# Patient Record
Sex: FEMALE | Race: WHITE | NOT HISPANIC OR LATINO | Employment: FULL TIME | URBAN - METROPOLITAN AREA
[De-identification: names, ages, dates, MRNs, and addresses within clinical notes are randomized per-mention and may not be internally consistent; named-entity substitution may affect disease eponyms.]

---

## 2017-04-07 ENCOUNTER — TELEPHONE (OUTPATIENT)
Dept: INTERNAL MEDICINE | Facility: CLINIC | Age: 22
End: 2017-04-07

## 2017-04-07 ENCOUNTER — CLINICAL SUPPORT (OUTPATIENT)
Dept: INTERNAL MEDICINE | Facility: CLINIC | Age: 22
End: 2017-04-07

## 2017-04-07 DIAGNOSIS — Z23 IMMUNIZATION DUE: Primary | ICD-10-CM

## 2017-04-07 DIAGNOSIS — Z00.00 HEALTH CARE MAINTENANCE: Primary | ICD-10-CM

## 2017-04-07 PROCEDURE — 90471 IMMUNIZATION ADMIN: CPT | Performed by: INTERNAL MEDICINE

## 2017-04-07 PROCEDURE — 90746 HEPB VACCINE 3 DOSE ADULT IM: CPT | Performed by: INTERNAL MEDICINE

## 2018-03-29 ENCOUNTER — OFFICE VISIT (OUTPATIENT)
Dept: INTERNAL MEDICINE | Facility: CLINIC | Age: 23
End: 2018-03-29

## 2018-03-29 VITALS
DIASTOLIC BLOOD PRESSURE: 68 MMHG | SYSTOLIC BLOOD PRESSURE: 116 MMHG | HEART RATE: 110 BPM | TEMPERATURE: 99.1 F | WEIGHT: 132 LBS | RESPIRATION RATE: 17 BRPM | BODY MASS INDEX: 23.38 KG/M2

## 2018-03-29 DIAGNOSIS — R05.9 COUGH: Primary | ICD-10-CM

## 2018-03-29 LAB
EXPIRATION DATE: NORMAL
FLUAV AG NPH QL: NEGATIVE
FLUBV AG NPH QL: NEGATIVE
INTERNAL CONTROL: NORMAL
Lab: NORMAL

## 2018-03-29 PROCEDURE — 99213 OFFICE O/P EST LOW 20 MIN: CPT | Performed by: INTERNAL MEDICINE

## 2018-03-29 PROCEDURE — 87804 INFLUENZA ASSAY W/OPTIC: CPT | Performed by: INTERNAL MEDICINE

## 2018-03-29 RX ORDER — PRENATAL VIT NO.126/IRON/FOLIC 28MG-0.8MG
TABLET ORAL DAILY
COMMUNITY
End: 2018-08-23

## 2018-03-29 NOTE — PROGRESS NOTES
Subjective   Elicia Figueroa is a 23 y.o. female.     History of Present Illness   Daughter tested positive for the fu.  She has had mild sore throat and headache and cough for the last few days.  She has felt hot.  No nausea or vomiting or diarrhea.  She is 21 weeks pregnant.    The following portions of the patient's history were reviewed and updated as appropriate: allergies, current medications, past medical history and problem list.    Review of Systems   Constitutional: Positive for fatigue. Negative for fever.   HENT: Positive for congestion and sore throat.    Eyes: Negative.    Respiratory: Positive for cough. Negative for chest tightness, shortness of breath and wheezing.    Cardiovascular: Negative.  Negative for chest pain, palpitations and leg swelling.   Gastrointestinal: Negative.  Negative for abdominal distention and abdominal pain.       Objective   Physical Exam   Constitutional: She appears well-developed and well-nourished.   HENT:   Mouth/Throat: Oropharynx is clear and moist.   Neck: Normal range of motion. Neck supple.   Cardiovascular: Normal rate, regular rhythm and normal heart sounds.    No murmur heard.  Pulmonary/Chest: Effort normal and breath sounds normal. No respiratory distress. She has no wheezes. She has no rales. She exhibits no tenderness.   Lymphadenopathy:     She has no cervical adenopathy.   Nursing note and vitals reviewed.        Assessment/Plan   Elicia was seen today for cough.    Diagnoses and all orders for this visit:    Cough  -     POCT Influenza A/B    Flu tests are negative.  Probably has viral illness.  Otc meds ok.  Call if higher fever or other sx.

## 2018-08-23 ENCOUNTER — OFFICE VISIT (OUTPATIENT)
Dept: INTERNAL MEDICINE | Facility: CLINIC | Age: 23
End: 2018-08-23

## 2018-08-23 VITALS
TEMPERATURE: 98.3 F | SYSTOLIC BLOOD PRESSURE: 122 MMHG | WEIGHT: 130.38 LBS | HEART RATE: 100 BPM | BODY MASS INDEX: 23.09 KG/M2 | RESPIRATION RATE: 18 BRPM | DIASTOLIC BLOOD PRESSURE: 78 MMHG

## 2018-08-23 DIAGNOSIS — F41.8 DEPRESSION WITH ANXIETY: Primary | ICD-10-CM

## 2018-08-23 PROCEDURE — 99213 OFFICE O/P EST LOW 20 MIN: CPT | Performed by: PHYSICIAN ASSISTANT
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
## 2018-08-30 ENCOUNTER — TELEPHONE (OUTPATIENT)
Dept: INTERNAL MEDICINE | Facility: CLINIC | Age: 23
End: 2018-08-30

## 2018-08-30 NOTE — TELEPHONE ENCOUNTER
----- Message from Khloe Reyes sent at 8/29/2018  1:59 PM EDT -----  Concerning psychology referral, pt was called to schedule, however she did not want to schedule an appt right now. She has phone number to call back if she changes her mind. Is this referral okay to cancel?

## 2018-09-13 ENCOUNTER — OFFICE VISIT (OUTPATIENT)
Dept: INTERNAL MEDICINE | Facility: CLINIC | Age: 23
End: 2018-09-13

## 2018-09-13 VITALS
WEIGHT: 127.25 LBS | TEMPERATURE: 97.4 F | SYSTOLIC BLOOD PRESSURE: 108 MMHG | HEIGHT: 63 IN | RESPIRATION RATE: 16 BRPM | BODY MASS INDEX: 22.55 KG/M2 | OXYGEN SATURATION: 97 % | HEART RATE: 67 BPM | DIASTOLIC BLOOD PRESSURE: 72 MMHG

## 2018-09-13 DIAGNOSIS — F41.8 DEPRESSION WITH ANXIETY: Primary | ICD-10-CM

## 2018-09-13 PROCEDURE — 99213 OFFICE O/P EST LOW 20 MIN: CPT | Performed by: PHYSICIAN ASSISTANT

## 2018-09-13 RX ORDER — SERTRALINE HYDROCHLORIDE 100 MG/1
100 TABLET, FILM COATED ORAL DAILY
Qty: 30 TABLET | Refills: 2 | Status: SHIPPED | OUTPATIENT
Start: 2018-09-13 | End: 2018-12-21 | Stop reason: SDUPTHER

## 2018-09-13 NOTE — PROGRESS NOTES
Chief Complaint   Patient presents with   • Depression     3 week F/U       Subjective       History of Present Illness     Elicia Figueroa is a 23 y.o. female. She presents for follow up of depression with anxiety. Patient states she is doing much better since beginning Zoloft 3 weeks ago. She says her moods are improved and she is having much less frequent, less severe anxiety attacks. A few mild anxiety attacks, but no SOA and tachycardia as she was experiencing before, just occasionally feeling overwhelmed. She is having less low moods. She feels that her interactions with her family have been less irritable. She states that the best part of medication has been improved sleep. She has been trying to reach out more to family members and feels this is also helping her moods. Overall much improved. No side effects from zoloft. Interested in increase dosage as this has helped her so much but still feels that she could use more help in improved low moods.       The following portions of the patient's history were reviewed and updated as appropriate: allergies, current medications, past medical history, past social history and problem list.    Allergies   Allergen Reactions   • Adhesive Tape Hives     Red and hives       Social History   Substance Use Topics   • Smoking status: Current Every Day Smoker   • Smokeless tobacco: Not on file   • Alcohol use Not on file         Current Outpatient Prescriptions:   •  azelastine (ASTELIN) 0.1 % nasal spray, 2 sprays into each nostril 2 (Two) Times a Day. Use in each nostril as directed, Disp: 1 each, Rfl: 0  •  sertraline (ZOLOFT) 100 MG tablet, Take 1 tablet by mouth Daily., Disp: 30 tablet, Rfl: 2    Review of Systems   Constitutional: Negative for chills, fatigue and fever.   HENT: Negative for congestion, ear pain, sore throat and trouble swallowing.    Eyes: Negative for pain.   Respiratory: Negative for cough, shortness of breath and wheezing.    Cardiovascular:  Negative for chest pain and palpitations.   Gastrointestinal: Negative for abdominal pain, diarrhea, nausea and vomiting.   Genitourinary: Negative for dysuria and hematuria.   Musculoskeletal: Negative for back pain.   Skin: Negative for rash.   Neurological: Negative for dizziness, syncope, weakness and headache.   Hematological: Does not bruise/bleed easily.   Psychiatric/Behavioral: Positive for depressed mood (improving). The patient is nervous/anxious (improving).        Objective   Vitals:    09/13/18 0802   BP: 108/72   Pulse: 67   Resp: 16   Temp: 97.4 °F (36.3 °C)   SpO2: 97%     Physical Exam   Constitutional: She appears well-developed and well-nourished.   HENT:   Head: Normocephalic and atraumatic.   Mouth/Throat: Oropharynx is clear and moist and mucous membranes are normal.   Eyes: Conjunctivae are normal.   Cardiovascular: Normal rate, regular rhythm and intact distal pulses.    No murmur heard.  Pulmonary/Chest: Effort normal and breath sounds normal. She has no wheezes. She has no rales.   Abdominal: Soft. There is no hepatosplenomegaly. There is no tenderness.   Lymphadenopathy:     She has no cervical adenopathy.   Skin: No rash noted.   Psychiatric: She has a normal mood and affect. Her behavior is normal.           Assessment/Plan   Elicia was seen today for depression.    Diagnoses and all orders for this visit:    Depression with anxiety  -     sertraline (ZOLOFT) 100 MG tablet; Take 1 tablet by mouth Daily.      OK to take 50mg 2 tabs until finishes old Rx. Refill for new 100mg qday.           Return in about 3 months (around 12/13/2018).

## 2018-11-05 ENCOUNTER — OFFICE VISIT (OUTPATIENT)
Dept: PSYCHIATRY | Facility: CLINIC | Age: 23
End: 2018-11-05

## 2018-11-05 DIAGNOSIS — F41.9 ANXIETY AND DEPRESSION: Primary | ICD-10-CM

## 2018-11-05 DIAGNOSIS — Z63.0 MARITAL PROBLEMS: ICD-10-CM

## 2018-11-05 DIAGNOSIS — F32.A ANXIETY AND DEPRESSION: Primary | ICD-10-CM

## 2018-11-05 PROCEDURE — 90791 PSYCH DIAGNOSTIC EVALUATION: CPT | Performed by: PSYCHOLOGIST

## 2018-11-05 SDOH — SOCIAL STABILITY - SOCIAL INSECURITY: PROBLEMS IN RELATIONSHIP WITH SPOUSE OR PARTNER: Z63.0
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
## 2018-12-20 ENCOUNTER — OFFICE VISIT (OUTPATIENT)
Dept: INTERNAL MEDICINE | Facility: CLINIC | Age: 23
End: 2018-12-20

## 2018-12-20 VITALS
WEIGHT: 125 LBS | HEIGHT: 63 IN | TEMPERATURE: 97.3 F | HEART RATE: 80 BPM | RESPIRATION RATE: 18 BRPM | OXYGEN SATURATION: 95 % | BODY MASS INDEX: 22.15 KG/M2 | DIASTOLIC BLOOD PRESSURE: 60 MMHG | SYSTOLIC BLOOD PRESSURE: 112 MMHG

## 2018-12-20 DIAGNOSIS — R82.90 FOUL SMELLING URINE: ICD-10-CM

## 2018-12-20 DIAGNOSIS — F41.8 DEPRESSION WITH ANXIETY: Primary | ICD-10-CM

## 2018-12-20 DIAGNOSIS — R82.998 LEUKOCYTES IN URINE: ICD-10-CM

## 2018-12-20 LAB
BILIRUB BLD-MCNC: NEGATIVE MG/DL
CLARITY, POC: CLEAR
COLOR UR: YELLOW
EXPIRATION DATE: ABNORMAL
GLUCOSE UR STRIP-MCNC: NEGATIVE MG/DL
KETONES UR QL: NEGATIVE
LEUKOCYTE EST, POC: ABNORMAL
Lab: ABNORMAL
NITRITE UR-MCNC: NEGATIVE MG/ML
PH UR: 8 [PH] (ref 5–8)
PROT UR STRIP-MCNC: NEGATIVE MG/DL
RBC # UR STRIP: NEGATIVE /UL
SP GR UR: 1 (ref 1–1.03)
UROBILINOGEN UR QL: NORMAL

## 2018-12-20 PROCEDURE — 87086 URINE CULTURE/COLONY COUNT: CPT | Performed by: PHYSICIAN ASSISTANT

## 2018-12-20 PROCEDURE — 99213 OFFICE O/P EST LOW 20 MIN: CPT | Performed by: PHYSICIAN ASSISTANT

## 2018-12-20 NOTE — PROGRESS NOTES
Chief Complaint   Patient presents with   • Depression     3 month F/U       Subjective       History of Present Illness     Elicia Figueroa is a 23 y.o. female. She presents for follow up of depression and anxiety. She also reports new problem of strong urine odor x2 days. Regarding depression, patient is taking zoloft daily as directed. Continues to have some low moods and irritability, but overall improved since beginning increased dose of zoloft. She states that her anxiety has improved dramatically, and no anxiety attacks since last visit. She still has mild anxiety a few days a week, but very brief and she is able to occupy herself with other tasks which help reduce anxiety. Pt states her low moods come from family stress primarily. She does not have good support of depression and meds from . She does have good support system with other family members. She denies any thoughts of self-harm, no attempts at self-harm. Pt has seen Dr. Balbuena for therapy/ counseling, and plans to continue with this after the holidays- pleased with counseling at this time.     Pt also reports strong urine odor x2 days. She denies dark urine, blood in urine, urinary frequency or urgency, low back pain, abdominal pain or burning with urination. Pt states she has had similar strong urine odor in past without additional symptoms, with dx UTI.       The following portions of the patient's history were reviewed and updated as appropriate: allergies, current medications, past family history, past medical history, past social history, past surgical history and problem list.    Allergies   Allergen Reactions   • Adhesive Tape Hives     Red and hives       Social History     Tobacco Use   • Smoking status: Current Every Day Smoker   • Smokeless tobacco: Never Used   Substance Use Topics   • Alcohol use: Not on file         Current Outpatient Medications:   •  azelastine (ASTELIN) 0.1 % nasal spray, 2 sprays into each nostril 2 (Two) Times  a Day. Use in each nostril as directed, Disp: 1 each, Rfl: 0  •  sertraline (ZOLOFT) 100 MG tablet, Take 1 tablet by mouth Daily., Disp: 30 tablet, Rfl: 2    Review of Systems   Constitutional: Negative for chills, fatigue and fever.   HENT: Negative for congestion, ear pain and sore throat.    Respiratory: Negative for cough, shortness of breath and wheezing.    Cardiovascular: Negative for chest pain and palpitations.   Gastrointestinal: Negative for abdominal pain, diarrhea, nausea and vomiting.   Genitourinary: Negative for difficulty urinating, dysuria, frequency, hematuria and urgency.        +foul urine smell   Neurological: Negative for headache.   Psychiatric/Behavioral: Positive for depressed mood and stress. Negative for self-injury and suicidal ideas. The patient is not nervous/anxious.        Objective   Vitals:    12/20/18 0914   BP: 112/60   Pulse: 80   Resp: 18   Temp: 97.3 °F (36.3 °C)   SpO2: 95%     Physical Exam   Constitutional: She appears well-developed and well-nourished.   HENT:   Head: Normocephalic and atraumatic.   Mouth/Throat: Oropharynx is clear and moist and mucous membranes are normal.   Eyes: Conjunctivae are normal.   Cardiovascular: Normal rate and regular rhythm.   No murmur heard.  Pulmonary/Chest: Effort normal and breath sounds normal. She has no wheezes. She has no rales.   Abdominal: Soft. There is no hepatosplenomegaly. There is no tenderness. There is no CVA tenderness.   Lymphadenopathy:     She has no cervical adenopathy.   Psychiatric: Her speech is normal and behavior is normal. Thought content normal. She exhibits a depressed mood.     Results for orders placed or performed in visit on 12/20/18   Urine Culture - Urine, Urine, Clean Catch   Result Value Ref Range    Urine Culture No growth    POC Urinalysis Dipstick, Automated   Result Value Ref Range    Color Yellow Yellow, Straw, Dark Yellow, Maida    Clarity, UA Clear (A) Clear    Specific Gravity  1.005 (A) 1.005 -  1.030    pH, Urine 8.0 5.0 - 8.0    Leukocytes 75 Iza/ul (A) Negative    Nitrite, UA Negative Negative    Protein, POC Negative Negative mg/dL    Glucose, UA Negative Negative, 1000 mg/dL (3+) mg/dL    Ketones, UA Negative Negative    Urobilinogen, UA Normal Normal    Bilirubin Negative Negative    Blood, UA Negative Negative    Lot Number [id number]     Expiration Date 9-30-19          Assessment/Plan   Elicia was seen today for depression.    Diagnoses and all orders for this visit:    Depression with anxiety    Foul smelling urine  -     POC Urinalysis Dipstick, Automated    Leukocytes in urine  -     Urine Culture - Urine, Urine, Clean Catch      Discussed continuing zoloft at this time as pt has had improvement in symptoms, and anxiety greatly improved on this med. May consider alternative med in the future or additional med for improved depression relief- pt agreed to wait until after holidays to begin new meds after some stress reduced with holiday season.  Small amount leuks in urine-- will send for Cx before beginning Abx. Pt in agreement with this plan.          Return in about 6 weeks (around 1/31/2019) for Follow up- depression .

## 2018-12-21 ENCOUNTER — TELEPHONE (OUTPATIENT)
Dept: INTERNAL MEDICINE | Facility: CLINIC | Age: 23
End: 2018-12-21

## 2018-12-21 DIAGNOSIS — F41.8 DEPRESSION WITH ANXIETY: ICD-10-CM

## 2018-12-21 RX ORDER — SERTRALINE HYDROCHLORIDE 100 MG/1
100 TABLET, FILM COATED ORAL DAILY
Qty: 30 TABLET | Refills: 2 | Status: SHIPPED | OUTPATIENT
Start: 2018-12-21 | End: 2019-04-08 | Stop reason: SDUPTHER

## 2018-12-21 NOTE — TELEPHONE ENCOUNTER
----- Message from Doris Weaver sent at 12/21/2018  2:49 PM EST -----  Contact: SELF  STANLEY GIO ASKED HER PHARMACY TO SEND A REQUEST FOR ZOLOFT BUT THEY HAVE NOT HEARD BACK. SHE USES THE [facility]  IN [place]. SHE IS ALREADY OUT, SHE CAN BE REACHED -[phone]

## 2018-12-22 LAB — BACTERIA SPEC AEROBE CULT: NORMAL

## 2019-01-30 ENCOUNTER — OFFICE VISIT (OUTPATIENT)
Dept: INTERNAL MEDICINE | Facility: CLINIC | Age: 24
End: 2019-01-30

## 2019-01-30 VITALS
BODY MASS INDEX: 21.61 KG/M2 | RESPIRATION RATE: 17 BRPM | WEIGHT: 122 LBS | HEART RATE: 104 BPM | SYSTOLIC BLOOD PRESSURE: 124 MMHG | DIASTOLIC BLOOD PRESSURE: 60 MMHG

## 2019-01-30 DIAGNOSIS — Z86.69 HX OF MIGRAINE HEADACHES: ICD-10-CM

## 2019-01-30 DIAGNOSIS — F41.8 DEPRESSION WITH ANXIETY: Primary | ICD-10-CM

## 2019-01-30 PROCEDURE — 99213 OFFICE O/P EST LOW 20 MIN: CPT | Performed by: INTERNAL MEDICINE

## 2019-01-30 RX ORDER — BUTALBITAL, ASPIRIN, AND CAFFEINE 50; 325; 40 MG/1; MG/1; MG/1
1 CAPSULE ORAL EVERY 4 HOURS PRN
Qty: 30 CAPSULE | Refills: 3 | Status: SHIPPED | OUTPATIENT
Start: 2019-01-30 | End: 2019-10-08 | Stop reason: SDUPTHER

## 2019-01-30 NOTE — PROGRESS NOTES
Subjective   Elicia Arndt is a 23 y.o. female.     History of Present Illness   She is here for follow up of  Depression and anxiety on zoloft.  She believes the anxiety is better, but the depression lingers some.  She also has migraines and is having one today. Only takes ibuprofen which does not help.      The following portions of the patient's history were reviewed and updated as appropriate: allergies, current medications, past medical history and problem list.    Review of Systems   Constitutional: Negative for fatigue and fever.   Respiratory: Negative.  Negative for chest tightness and shortness of breath.    Cardiovascular: Negative.  Negative for chest pain, palpitations and leg swelling.   Gastrointestinal: Negative.  Negative for abdominal distention and abdominal pain.   Neurological: Positive for headache.   Psychiatric/Behavioral: Positive for depressed mood.       Objective   Physical Exam   Constitutional: She appears well-developed and well-nourished.   Cardiovascular: Normal rate, regular rhythm and normal heart sounds.   Neurological: She is alert.   Nursing note and vitals reviewed.        Assessment/Plan   Elicia was seen today for anxiety.    Diagnoses and all orders for this visit:    Depression with anxiety    Hx of migraine headaches    Other orders  -     butalbital-aspirin-caffeine (FIORINAL) -40 MG per capsule; Take 1 capsule by mouth Every 4 (Four) Hours As Needed for Headache.    Will try fiorinal for headaches, discussed.  Continue with zoloft for now.  She is also seeing psychologist.  May need psychiatrist if continues.  Recheck 6 months.

## 2019-02-25 ENCOUNTER — OFFICE VISIT (OUTPATIENT)
Dept: INTERNAL MEDICINE | Facility: CLINIC | Age: 24
End: 2019-02-25

## 2019-02-25 VITALS
SYSTOLIC BLOOD PRESSURE: 120 MMHG | BODY MASS INDEX: 21.62 KG/M2 | WEIGHT: 122 LBS | HEART RATE: 82 BPM | TEMPERATURE: 98.3 F | RESPIRATION RATE: 16 BRPM | HEIGHT: 63 IN | DIASTOLIC BLOOD PRESSURE: 62 MMHG | OXYGEN SATURATION: 97 %

## 2019-02-25 DIAGNOSIS — R68.89 FLU-LIKE SYMPTOMS: ICD-10-CM

## 2019-02-25 DIAGNOSIS — R35.0 URINARY FREQUENCY: ICD-10-CM

## 2019-02-25 DIAGNOSIS — J11.1 INFLUENZA: Primary | ICD-10-CM

## 2019-02-25 DIAGNOSIS — J02.8 PHARYNGITIS DUE TO OTHER ORGANISM: ICD-10-CM

## 2019-02-25 DIAGNOSIS — R82.998 LEUKOCYTES IN URINE: ICD-10-CM

## 2019-02-25 LAB
BILIRUB BLD-MCNC: NEGATIVE MG/DL
CLARITY, POC: CLEAR
COLOR UR: YELLOW
EXPIRATION DATE: ABNORMAL
EXPIRATION DATE: NORMAL
EXPIRATION DATE: NORMAL
FLUAV AG NPH QL: NEGATIVE
FLUBV AG NPH QL: NEGATIVE
GLUCOSE UR STRIP-MCNC: NEGATIVE MG/DL
INTERNAL CONTROL: NORMAL
INTERNAL CONTROL: NORMAL
KETONES UR QL: NEGATIVE
LEUKOCYTE EST, POC: ABNORMAL
Lab: ABNORMAL
Lab: NORMAL
Lab: NORMAL
NITRITE UR-MCNC: NEGATIVE MG/ML
PH UR: 7.5 [PH] (ref 5–8)
PROT UR STRIP-MCNC: NEGATIVE MG/DL
RBC # UR STRIP: NEGATIVE /UL
S PYO AG THROAT QL: NEGATIVE
SP GR UR: 1.01 (ref 1–1.03)
UROBILINOGEN UR QL: NORMAL

## 2019-02-25 PROCEDURE — 87804 INFLUENZA ASSAY W/OPTIC: CPT | Performed by: PHYSICIAN ASSISTANT

## 2019-02-25 PROCEDURE — 87880 STREP A ASSAY W/OPTIC: CPT | Performed by: PHYSICIAN ASSISTANT

## 2019-02-25 PROCEDURE — 87086 URINE CULTURE/COLONY COUNT: CPT | Performed by: PHYSICIAN ASSISTANT

## 2019-02-25 PROCEDURE — 99214 OFFICE O/P EST MOD 30 MIN: CPT | Performed by: PHYSICIAN ASSISTANT

## 2019-02-25 RX ORDER — OSELTAMIVIR PHOSPHATE 75 MG/1
75 CAPSULE ORAL 2 TIMES DAILY
Qty: 10 CAPSULE | Refills: 0 | Status: SHIPPED | OUTPATIENT
Start: 2019-02-25 | End: 2019-03-02

## 2019-02-25 NOTE — PROGRESS NOTES
Chief Complaint   Patient presents with   • Flu Symptoms     x2 days   • Urinary Frequency     x2 months       Subjective       History of Present Illness     Elicia Arndt is a 23 y.o. female. She presents with 2 day history of flu-like symptoms, with exposure to flu. Pt's daughters both dx + flu A this morning. Pt states she has body aches, chills, subjective fever, fatigue. Also has cough with light yellow phlegm production, and congestion, sneezing, sore throat. Had 1 episode N/V yesterday morning, none further since that time. She has not tried any meds for this issue.     Pt also has urinary frequency x2 months, with some associated urgency and incontinence. Pt states she is going every 1-2 hours. She has to make it to a restroom quickly, or has incontinence episode. She denies dark urine, blood in urine, pelvic pain, low back pain. She denies any burning or pressure. Pt is doing Kegel exercises daily at home.     The following portions of the patient's history were reviewed and updated as appropriate: allergies, current medications, past family history, past medical history, past social history, past surgical history and problem list.    Allergies   Allergen Reactions   • Adhesive Tape Hives     Red and hives       Social History     Tobacco Use   • Smoking status: Current Every Day Smoker   • Smokeless tobacco: Never Used   Substance Use Topics   • Alcohol use: No     Frequency: Never         Current Outpatient Medications:   •  azelastine (ASTELIN) 0.1 % nasal spray, 2 sprays into each nostril 2 (Two) Times a Day. Use in each nostril as directed, Disp: 1 each, Rfl: 0  •  butalbital-aspirin-caffeine (FIORINAL) -40 MG per capsule, Take 1 capsule by mouth Every 4 (Four) Hours As Needed for Headache., Disp: 30 capsule, Rfl: 3  •  sertraline (ZOLOFT) 100 MG tablet, Take 1 tablet by mouth Daily., Disp: 30 tablet, Rfl: 2  •  oseltamivir (TAMIFLU) 75 MG capsule, Take 1 capsule by mouth 2 (Two) Times a Day  for 5 days., Disp: 10 capsule, Rfl: 0    Review of Systems   Constitutional: Positive for chills and fatigue. Negative for fever.        +body aches   HENT: Positive for congestion, sneezing and sore throat. Negative for ear pain and trouble swallowing.    Eyes: Negative for pain.   Respiratory: Positive for cough. Negative for shortness of breath and wheezing.    Cardiovascular: Negative for chest pain and palpitations.   Gastrointestinal: Positive for nausea and vomiting. Negative for abdominal pain and diarrhea.   Genitourinary: Positive for urinary incontinence, frequency and urgency. Negative for dysuria, hematuria and pelvic pain.   Musculoskeletal: Negative for back pain.   Allergic/Immunologic: Negative for immunocompromised state.   Neurological: Negative for dizziness, weakness and headache.       Objective   Vitals:    02/25/19 1225   BP: 120/62   Pulse: 82   Resp: 16   Temp: 98.3 °F (36.8 °C)   SpO2: 97%     Physical Exam   Constitutional: She appears well-developed and well-nourished.   HENT:   Head: Normocephalic and atraumatic.   Right Ear: Tympanic membrane, external ear and ear canal normal.   Left Ear: Tympanic membrane, external ear and ear canal normal.   Mouth/Throat: Mucous membranes are normal. Posterior oropharyngeal erythema present. No oropharyngeal exudate or posterior oropharyngeal edema.   Eyes: Conjunctivae are normal.   Neck: Normal range of motion. Neck supple.   Cardiovascular: Normal rate and regular rhythm.   No murmur heard.  Pulmonary/Chest: Effort normal and breath sounds normal. She has no wheezes. She has no rales.   Abdominal: Soft. There is no hepatosplenomegaly. There is no tenderness.   Lymphadenopathy:     She has no cervical adenopathy.   Psychiatric: She has a normal mood and affect. Her behavior is normal.       Results for orders placed or performed in visit on 02/25/19   POC Influenza A / B   Result Value Ref Range    Rapid Influenza A Ag Negative Negative    Rapid  Influenza B Ag Negative Negative    Internal Control Passed Passed    Lot Number [id number]     Expiration Date 4-30-21    POC Rapid Strep A   Result Value Ref Range    Rapid Strep A Screen Negative Negative, VALID, INVALID, Not Performed    Internal Control Passed Passed    Lot Number [id number]     Expiration Date 6-30-20    POC Urinalysis Dipstick, Automated   Result Value Ref Range    Color Yellow Yellow, Straw, Dark Yellow, Maida    Clarity, UA Clear Clear    Specific Gravity  1.015 1.005 - 1.030    pH, Urine 7.5 5.0 - 8.0    Leukocytes Small (1+) (A) Negative    Nitrite, UA Negative Negative    Protein, POC Negative Negative mg/dL    Glucose, UA Negative Negative, 1000 mg/dL (3+) mg/dL    Ketones, UA Negative Negative    Urobilinogen, UA Normal Normal    Bilirubin Negative Negative    Blood, UA Negative Negative    Lot Number [id number]     Expiration Date 11-30-19            Assessment/Plan   Elicia was seen today for flu symptoms and urinary frequency.    Diagnoses and all orders for this visit:    Influenza    Flu-like symptoms  -     POC Influenza A / B  -     POC Rapid Strep A    Pharyngitis due to other organism  -     POC Rapid Strep A    Urinary frequency  -     POC Urinalysis Dipstick, Automated    Leukocytes in urine  -     Urine Culture - Urine, Urine, Clean Catch    Other orders  -     oseltamivir (TAMIFLU) 75 MG capsule; Take 1 capsule by mouth 2 (Two) Times a Day for 5 days.      Negative flu A/B. Negative strep. Will tx empirically for flu A, given that both daughters tested + flu A this AM.   Finish all Tamiflu as directed.  Tylenol or Ibuprofen rotating PRN.   Plenty of fluids and rest.   Discussed Kegel exercises and how to perform properly. Also discussed pelvic floor PT in the future if symptoms of urinary incontinence persist. Will send UCx.          Return if symptoms worsen or fail to improve.

## 2019-02-27 LAB — BACTERIA SPEC AEROBE CULT: NORMAL

## 2019-04-08 DIAGNOSIS — F41.8 DEPRESSION WITH ANXIETY: ICD-10-CM

## 2019-04-08 RX ORDER — SERTRALINE HYDROCHLORIDE 100 MG/1
TABLET, FILM COATED ORAL
Qty: 30 TABLET | Refills: 4 | Status: SHIPPED | OUTPATIENT
Start: 2019-04-08 | End: 2019-05-15 | Stop reason: SDUPTHER

## 2019-04-30 ENCOUNTER — HOSPITAL ENCOUNTER (OUTPATIENT)
Dept: GENERAL RADIOLOGY | Facility: HOSPITAL | Age: 24
Discharge: HOME | End: 2019-04-30
Admitting: NURSE PRACTITIONER

## 2019-04-30 ENCOUNTER — OFFICE VISIT (OUTPATIENT)
Dept: INTERNAL MEDICINE | Facility: CLINIC | Age: 24
End: 2019-04-30

## 2019-04-30 VITALS
OXYGEN SATURATION: 99 % | TEMPERATURE: 98.3 F | DIASTOLIC BLOOD PRESSURE: 60 MMHG | RESPIRATION RATE: 17 BRPM | HEIGHT: 63 IN | WEIGHT: 127 LBS | BODY MASS INDEX: 22.5 KG/M2 | HEART RATE: 89 BPM | SYSTOLIC BLOOD PRESSURE: 120 MMHG

## 2019-04-30 DIAGNOSIS — M25.511 RIGHT SHOULDER PAIN, UNSPECIFIED CHRONICITY: Primary | ICD-10-CM

## 2019-04-30 PROCEDURE — 96372 THER/PROPH/DIAG INJ SC/IM: CPT | Performed by: NURSE PRACTITIONER

## 2019-04-30 PROCEDURE — 99213 OFFICE O/P EST LOW 20 MIN: CPT | Performed by: NURSE PRACTITIONER

## 2019-04-30 PROCEDURE — 73030 X-RAY EXAM OF SHOULDER: CPT

## 2019-04-30 RX ORDER — KETOROLAC TROMETHAMINE 30 MG/ML
60 INJECTION, SOLUTION INTRAMUSCULAR; INTRAVENOUS ONCE
Status: COMPLETED | OUTPATIENT
Start: 2019-04-30 | End: 2019-04-30

## 2019-04-30 RX ADMIN — KETOROLAC TROMETHAMINE 60 MG: 30 INJECTION, SOLUTION INTRAMUSCULAR; INTRAVENOUS at 12:56

## 2019-04-30 NOTE — PROGRESS NOTES
Subjective:    Elicia Arndt is a 24 y.o. female.     Chief Complaint   Patient presents with   • Shoulder Injury     x2 months, right , ibuprofen, biofreeze        History of Present Illness   Patient complains of right shoulder pain x 2 months. She played softball during high school and tore rotator cuff in 2010 or 2011. She is unsure-it was when she was in 10 th grade. She had limited physical therapy and no surgery. She states she learned to deal with the pain in right shoulder. Pain has worsened for past 2 months. Current pain 10/0-10 scale. She has tried ibuprofen and Biofreeze with no relief. 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     Current Outpatient Medications:   •  butalbital-aspirin-caffeine (FIORINAL) -40 MG per capsule, Take 1 capsule by mouth Every 4 (Four) Hours As Needed for Headache., Disp: 30 capsule, Rfl: 3  •  sertraline (ZOLOFT) 100 MG tablet, TAKE ONE TABLET BY MOUTH DAILY, Disp: 30 tablet, Rfl: 4  No current facility-administered medications for this visit.      The following portions of the patient's history were reviewed and updated as appropriate: allergies, current medications, past family history, past medical history, past social history, past surgical history and problem list.    Review of Systems   Constitutional: Negative for fatigue and unexpected weight change.   Eyes: Negative for visual disturbance.   Respiratory: Negative for cough, shortness of breath and wheezing.    Cardiovascular: Negative for chest pain, palpitations and leg swelling.   Gastrointestinal: Negative for abdominal pain, blood in stool, constipation, diarrhea, nausea and vomiting.   Endocrine: Negative for polydipsia and polyuria.   Musculoskeletal: Negative for arthralgias and myalgias.        Right shoulder pain     Neurological: Negative for dizziness, syncope, light-headedness and headaches.   Psychiatric/Behavioral: Negative for decreased concentration.  "      Objective:    /60 (BP Location: Right arm, Patient Position: Sitting, Cuff Size: Adult)   Pulse 89   Temp 98.3 °F (36.8 °C) (Temporal)   Resp 17   Ht 160 cm (63\")   Wt 57.6 kg (127 lb)   SpO2 99%   BMI 22.50 kg/m²     Physical Exam   Constitutional: She appears well-developed and well-nourished. She is cooperative.  Non-toxic appearance. No distress.   HENT:   Head: Normocephalic and atraumatic.   Right Ear: External ear normal.   Left Ear: External ear normal.   Nose: Nose normal.   Mouth/Throat: Uvula is midline, oropharynx is clear and moist and mucous membranes are normal.   Eyes: Conjunctivae and lids are normal.   Neck: Normal range of motion. Neck supple.   There is no tenderness to palpation over the cervical spine or paraspinal muscles.   Cardiovascular: Normal rate, regular rhythm and normal heart sounds.   No murmur heard.  Pulmonary/Chest: Effort normal and breath sounds normal.   Musculoskeletal:        Right shoulder: She exhibits decreased range of motion, tenderness and bony tenderness.   There is tenderness to palpation of the affected shoulder.  There is no erythema, edema, or warmth.  There is pain with abduction, adduction, external rotation, and internal rotation.  The patient is able to put both hands behind the head, both hands behind the back, and do the crossover maneuver with pain. Audible pop heard when moving right shoulder.   Neurological: She is alert. She has normal strength and normal reflexes.   Skin: Skin is warm, dry and intact. No rash noted.   Psychiatric: She has a normal mood and affect.   Nursing note and vitals reviewed.      Assessment/Plan:    Elicia was seen today for shoulder injury.    Diagnoses and all orders for this visit:    Right shoulder pain, unspecified chronicity  -     XR Shoulder 1 View Right  -     Ambulatory Referral to Physical Therapy Evaluate and treat  -     MRI Shoulder Right Without Contrast; Future  -     Ambulatory Referral to " Orthopedic Surgery  -     ketorolac (TORADOL) injection 60 mg    Tylenol or ibuprofen as needed. Discussed to avoid aggravating activities.     Return if symptoms worsen or fail to improve, 7/2019 appointment.

## 2019-05-06 ENCOUNTER — OFFICE VISIT (OUTPATIENT)
Dept: ORTHOPEDIC SURGERY | Facility: CLINIC | Age: 24
End: 2019-05-06

## 2019-05-06 VITALS — OXYGEN SATURATION: 99 % | HEART RATE: 127 BPM | HEIGHT: 63 IN | BODY MASS INDEX: 22.5 KG/M2 | WEIGHT: 126.98 LBS

## 2019-05-06 DIAGNOSIS — M75.91 SUPRASPINATUS TENDINITIS, RIGHT: Primary | ICD-10-CM

## 2019-05-06 PROCEDURE — 99203 OFFICE O/P NEW LOW 30 MIN: CPT | Performed by: ORTHOPAEDIC SURGERY

## 2019-05-06 NOTE — PROGRESS NOTES
[facility] Note    Subjective     Chief Complaint   Patient presents with   • Right Shoulder - Pain     Pain since high school sports. Gotten worse recently. Trouble with sleeping at night, and holding any weight.        HPI      Elicia Arndt is a 24 y.o. female.  She says she was diagnosed with a rotator cuff tear and injury in 2010 in high school playing softball.  She underwent physical therapy one day.  She is now a CNA.  Pain is 10 out of 10.  She has an MRI scheduled for May 14.  She does not want physical therapy she does not want to work restrictions.        History reviewed. No pertinent past medical history.   History reviewed. No pertinent surgical history.   History reviewed. No pertinent family history.  Social History     Socioeconomic History   • Marital status:      Spouse name: Not on file   • Number of children: Not on file   • Years of education: Not on file   • Highest education level: Not on file   Tobacco Use   • Smoking status: Current Every Day Smoker   • Smokeless tobacco: Never Used   Substance and Sexual Activity   • Alcohol use: No     Frequency: Never      Current Outpatient Medications on File Prior to Visit   Medication Sig Dispense Refill   • butalbital-aspirin-caffeine (FIORINAL) -40 MG per capsule Take 1 capsule by mouth Every 4 (Four) Hours As Needed for Headache. 30 capsule 3   • sertraline (ZOLOFT) 100 MG tablet TAKE ONE TABLET BY MOUTH DAILY 30 tablet 4     No current facility-administered medications on file prior to visit.       Allergies   Allergen Reactions   • Adhesive Tape Hives     Red and hives          The following portions of the patient's history were reviewed and updated as appropriate: allergies, current medications, past family history, past medical history, past social history, past surgical history and problem list.    Review of Systems   Constitutional: Positive for activity change.   HENT: Negative.    Eyes: Negative.   "  Respiratory: Negative.    Cardiovascular: Negative.    Gastrointestinal: Negative.    Endocrine: Negative.    Genitourinary: Negative.    Musculoskeletal: Positive for arthralgias (right shoulder).   Skin: Negative.    Allergic/Immunologic: Negative.    Neurological: Negative.    Hematological: Negative.    Psychiatric/Behavioral: Negative.         Objective      Physical Exam  Pulse (!) 127   Ht 160 cm (63\")   Wt 57.6 kg (126 lb 15.8 oz)   SpO2 99%   Breastfeeding? No   BMI 22.49 kg/m²     Body mass index is 22.49 kg/m².        GENERAL APPEARANCE: awake, alert & oriented x 3, in no acute distress and well developed, well nourished  PSYCH: normal mood and affect  LUNGS:  breathing nonlabored, no wheezing  EYES: sclera anicteric, pupils equal  CARDIOVASCULAR: palpable pulses dorsalis pedis, palpable posterior tibial bilaterally. Capillary refill less than 2 seconds  INTEGUMENTARY: skin intact, no clubbing, cyanosis  NEUROLOGIC:  Normal Sensation and reflexes             Ortho Exam  Musculoskeletal   Upper Extremity   Right Shoulder     Inspection and Palpation:     Tenderness - none    Crepitus - none    Sensation is normal    Examination reveals no ecchymosis.      Strength and Tone:    Supraspinatus,  Infraspinatus - 4/5    Subscapularis - 5/5    Deltoid - 5/5     Range of Motion   Left shoulder:    Internal Rotation: ROM - T7    External Rotation: AROM - 80 degrees    Elevation through flexion: AROM - 180 degrees    Right Shoulder:    Internal Rotation: ROM - T7    External Rotation: AROM - 80 degrees    Elevation through flexion: AROM - 150 degrees     Abduction -150 degrees     Instability   Right shoulder    Sulcus sign negative    Apprehension test negative    Tawny relocation test negative    Jerk test negative   Impingement   Right shoulder    Kirkland impingement test positive    Neer impingement test positive   Functional Testing   Right shoulder    AC crossover adduction test negative    Abdominal " compression test negative    Lift-off sign negative    Speed's test negative    Thurman's test negative    Horriblower's sign negative       Imaging/Studies  Imaging Results (last 7 days)     ** No results found for the last 168 hours. **        I viewed her x-rays from April 30  Assessment/Plan        ICD-10-CM ICD-9-CM   1. Supraspinatus tendinitis, right M75.91 726.10     I agree with the order for the MRI.  She has a known rotator cuff injury from 9 years ago.  Based upon the MRI if she has a full-thickness tear to the cuff or labrum I would recommend surgery.  If she has a small partial tear we will do additional physical therapy.  I will see her back after the MRI.  She will continue physical therapy in the meantime.  Medical Decision Making  Management Options : over-the-counter medicine and physical/occupational therapy  Data/Risk: radiology tests and independent visualization of imaging, lab tests, or EMG/NCV    Ted Ryan MD  05/06/19  10:31 AM         EMR Dragon/Transcription disclaimer:  Much of this encounter note is an electronic transcription of spoken language to printed text. Electronic transcription of spoken language may permit erroneous, or at times, nonsensical words or phrases to be inadvertently transcribed. Although I have reviewed the note for such errors, some may still exist.

## 2019-05-09 ENCOUNTER — HOSPITAL ENCOUNTER (OUTPATIENT)
Dept: PHYSICAL THERAPY | Facility: HOSPITAL | Age: 24
Setting detail: THERAPIES SERIES
Discharge: HOME | End: 2019-05-09

## 2019-05-09 DIAGNOSIS — M25.511 RIGHT SHOULDER PAIN, UNSPECIFIED CHRONICITY: Primary | ICD-10-CM

## 2019-05-09 PROCEDURE — 97161 PT EVAL LOW COMPLEX 20 MIN: CPT | Performed by: PHYSICAL THERAPIST

## 2019-05-09 NOTE — THERAPY EVALUATION
Outpatient Physical Therapy Ortho Initial Evaluation  [facility]     Patient Name: Elicia Arndt  : 1995  MRN: [id number]  Today's Date: 2019      Visit Date: 2019    Patient Active Problem List   Diagnosis   • Constipation   • Pain in female pelvis   • Depression with anxiety   • Hx of migraine headaches        No past medical history on file.     No past surgical history on file.    Visit Dx:     ICD-10-CM ICD-9-CM   1. Right shoulder pain, unspecified chronicity M25.511 719.41         Patient History     Row Name 19 0800             History    Chief Complaint  Pain  -CR      Type of Pain  Shoulder pain  -CR      Date Current Problem(s) Began  05/09/10  -CR      Brief Description of Current Complaint  25 yo female report in  tore right RC.  Client saw Dr. Ryan and will be receiving MRI 19.  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  She uses 600 mg ibuprofen, biofreeze.    -CR      Patient/Caregiver Goals  Relieve pain  -CR      Current Tobacco Use  yes  -CR      Smoking Status  yes  -CR      Hand Dominance  right-handed  -CR      Occupation/sports/leisure activities  CNA   -CR      Patient seeing anyone else for problem(s)?  Dr. Ryan  -CR      Are you or can you be pregnant  No  -CR         Pain     Pain Location  Shoulder  -CR      Pain at Present  5  -CR      Pain Frequency  Constant/continuous  -CR      What Performance Factors Make the Current Problem(s) WORSE?  use of arm, sleeping on side, carrying child  -CR      What Performance Factors Make the Current Problem(s) BETTER?  none identified  -CR      Is your sleep disturbed?  Yes  -CR      Difficulties at work?  yes, as CNA  -CR      Difficulties with ADL's?  yes, with raising arm  -CR         Fall Risk Assessment    Any falls in the past year:  No  -CR      Does patient have a fear of falling  No  -CR          Daily Activities    Primary Language  English  -CR      Are you able to read  Yes  -CR      Are you able to write  Yes  -CR      Patient is concerned about/has problems with  Performing job responsibilities/community activities (work, school,;Performing home management (household chores, shopping, care of dependents)  -CR      Does patient have problems with the following?  Depression;Anxiety  -CR      Barriers to learning  None  -CR      Pt Participated in POC and Goals  Yes  -CR         Safety    Are you being hurt, hit, or frightened by anyone at home or in your life?  No  -CR      Are you being neglected by a caregiver  No  -CR        User Key  (r) = Recorded By, (t) = Taken By, (c) = Cosigned By    Initials Name Provider Type    CR Rocky Beltrán, PT Physical Therapist          PT Ortho     Row Name 05/09/19 0800       Special Tests/Palpation    Special Tests/Palpation  Shoulder  -CR       Shoulder Girdle Accessory Motions    Shoulder Girdle Accessory Motions Tested?  Yes  -CR    Posterior glide of humerus  Hypomobile  -CR       Shoulder Impingement/Rotator Cuff Special Tests    Kirkland-Jamarcus Test (RC Lesion vs. Bursitis)  Positive  -CR    Neer Impingement Test (RC Lesion vs. Bursitis)  Positive  -CR    Full Can Test (RC Lesion)  Positive  -CR       Shoulder Laxity/Instability Special Tests    Anterior Apprehension/Relocation Test, at 90 Degrees  Negative  -CR       General ROM    RT Upper Ext  Rt Shoulder ABduction;Rt Shoulder Flexion;Rt Shoulder External Rotation;Rt Shoulder Internal Rotation  -CR    LT Upper Ext  Lt Shoulder ABduction;Lt Shoulder Extension;Lt Shoulder External Rotation;Lt Shoulder Internal Rotation  -CR       Right Upper Ext    Rt Shoulder Abduction AROM  90  -CR    Rt Shoulder Flexion AROM  140  -CR    Rt Shoulder Flexion PROM  160  -CR    Rt Shoulder External Rotation AROM  60  -CR       Left Upper Ext    Lt Shoulder Abduction AROM  180  -CR    Lt Shoulder Flexion AROM  180  -CR     Lt Shoulder External Rotation AROM  90  -CR       MMT (Manual Muscle Testing)    Rt Upper Ext  Rt Shoulder Internal Rotation;Rt Shoulder External Rotation;Rt Shoulder Flexion;Rt Shoulder ABduction  -CR       MMT Right Upper Ext    Rt Shoulder Flexion MMT, Gross Movement  (4-/5) good minus  -CR    Rt Shoulder ABduction MMT, Gross Movement  (4/5) good  -CR    Rt Shoulder Internal Rotation MMT, Gross Movement  (4-/5) good minus  -CR    Rt Shoulder External Rotation MMT, Gross Movement  (4-/5) good minus  -CR        Strength Right    # Reps  1  -CR    Right Rung  2  -CR    Right  Test 1  65  -CR     Strength Average Right  65  -CR        Strength Left    # Reps  1  -CR    Left Rung  2  -CR    Left  Test 1  65  -CR     Strength Average Left  65  -CR       Hand  Strength     Strength Affected Side  Bilateral  -CR      User Key  (r) = Recorded By, (t) = Taken By, (c) = Cosigned By    Initials Name Provider Type    Rocky Conner, PT Physical Therapist                      Therapy Education  Education Details: Client has been provided a written HEP including shoulder isometrics in abd, add, IR, and ER directions.       PT OP Goals     Row Name 05/09/19 0800          PT Short Term Goals    STG Date to Achieve  06/06/19  -CR     STG 1  client will demonstrate independence with initial HEP  -CR     STG 1 Progress  New  -CR     STG 2  client will demonstrate increase AROM elevation to 150 degrees  -CR     STG 2 Progress  New  -CR     STG 3  Client will demonstrate increase in strength to 4/5   -CR     STG 3 Progress  New  -CR        Long Term Goals    LTG Date to Achieve  07/04/19  -CR     LTG 1  client will demonstrate full AROM right shoulder   -CR     LTG 1 Progress  New  -CR     LTG 2  client will demonstrate >/= 4+/5 strength RUE  -CR     LTG 2 Progress  New  -CR     LTG 3  client will report qDASH limited <50  -CR     LTG 3 Progress  New  -CR        Time Calculation    PT Goal  Re-Cert Due Date  08/07/19  -CR       User Key  (r) = Recorded By, (t) = Taken By, (c) = Cosigned By    Initials Name Provider Type    Rocky Conner, PT Physical Therapist          PT Assessment/Plan     Row Name 05/09/19 0828 05/09/19 0827       PT Assessment    Functional Limitations  --  Limitation in home management;Performance in leisure activities;Performance in work activities;Limitations in functional capacity and performance;Performance in self-care ADL  -CR    Impairments  --  Posture;Muscle strength;Pain;Range of motion;Joint mobility;Motor function  -CR    Assessment Comments  23 yo female arrives with evolving symptoms of low complexity.  She has history of right RC tear and ongoing symptoms have prompted follow up MRI and MD visit.  Today she demosntrates impairments in strength, AROM/PROM, and GH and scapular rhythm.  Client demonstrates anxiety re: therapy intervention due to previous experiences of pain.  She has been educated on initial HEP consisting of isometric TE and will follow up 2x/week until imaging and MD follow up is completed to further update/or continue POC.   -CR  --    Please refer to paper survey for additional self-reported information  Yes  -CR  --    Rehab Potential  Fair  -CR  --    Patient/caregiver participated in establishment of treatment plan and goals  Yes  -CR  --    Patient would benefit from skilled therapy intervention  Yes  -CR  --       PT Plan    PT Frequency  1x/week;2x/week  -CR  --    Predicted Duration of Therapy Intervention (Therapy Eval)  12 visits  -CR  --    Planned CPT's?  PT EVAL LOW COMPLEXITY: 17657;PT MANUAL THERAPY EA 15 MIN: 77086;PT THER PROC EA 15 MIN: 22784;PT HOT/COLD PACK WC NONMCARE: 24937;PT RE-EVAL: 36237;PT THER ACT EA 15 MIN: 26943;PT NEUROMUSC RE-EDUCATION EA 15 MIN: 28418;PT SELF CARE/HOME MGMT/TRAIN EA 15: 48512;PT SELF CARE/MGMT/TRAIN 15 MIN: 03767  -CR  --    PT Plan Comments  2x/week to address pain, strength, ROM, and  functional deficits.    -CR  --      User Key  (r) = Recorded By, (t) = Taken By, (c) = Cosigned By    Initials Name Provider Type    Rocky Conner, PT Physical Therapist                              Outcome Measure Options: Quick DASH  Quick DASH  Open a tight or new jar.: Moderate Difficulty  Do heavy household chores (e.g., wash walls, wash floors): Moderate Difficulty  Carry a shopping bag or briefcase: Severe Difficulty  Wash your back: Unable  Use a knife to cut food: No Difficulty  Recreational activities in which you take some force or impact through your arm, should or hand (e.g. golf, hammering, tennis, etc.): Severe Difficulty  During the past week, to what extent has your arm, shoulder, or hand problem interfered with your normal social activites with family, friends, neighbors or groups?: Extremely  During the past week, were you limited in your work or other regular daily activities as a result of your arm, shoulder or hand problem?: Not limited at all  Arm, Shoulder, or hand pain: Extreme  Tingling (pins and needles) in your arm, shoulder, or hand: Extreme  During the past week, how much difficulty have you had sleeping because of the pain in your arm, shoulder or hand?: So much Difficulty that I can't sleep  Number of Questions Answered: 11  Quick DASH Score: 68.18         Time Calculation:     Start Time: 0800     Therapy Charges for Today     Code Description Service Date Service Provider Modifiers Qty    90328160776  PT EVAL LOW COMPLEXITY 3 5/9/2019 Rocky Beltrán, PT GP 1          PT G-Codes  Outcome Measure Options: Quick DASH  Quick DASH Score: 68.18         Rocky Beltrán PT  5/9/2019

## 2019-05-14 ENCOUNTER — HOSPITAL ENCOUNTER (OUTPATIENT)
Dept: MRI IMAGING | Facility: HOSPITAL | Age: 24
Discharge: HOME | End: 2019-05-14
Admitting: NURSE PRACTITIONER

## 2019-05-14 ENCOUNTER — HOSPITAL ENCOUNTER (OUTPATIENT)
Dept: PHYSICAL THERAPY | Facility: HOSPITAL | Age: 24
Setting detail: THERAPIES SERIES
Discharge: HOME | End: 2019-05-14

## 2019-05-14 DIAGNOSIS — M25.511 RIGHT SHOULDER PAIN, UNSPECIFIED CHRONICITY: ICD-10-CM

## 2019-05-14 PROCEDURE — 97110 THERAPEUTIC EXERCISES: CPT | Performed by: PHYSICAL THERAPIST

## 2019-05-14 PROCEDURE — 73221 MRI JOINT UPR EXTREM W/O DYE: CPT

## 2019-05-14 PROCEDURE — 97140 MANUAL THERAPY 1/> REGIONS: CPT | Performed by: PHYSICAL THERAPIST

## 2019-05-15 DIAGNOSIS — F41.8 DEPRESSION WITH ANXIETY: ICD-10-CM

## 2019-05-16 RX ORDER — SERTRALINE HYDROCHLORIDE 100 MG/1
100 TABLET, FILM COATED ORAL DAILY
Qty: 30 TABLET | Refills: 4 | Status: SHIPPED | OUTPATIENT
Start: 2019-05-16 | End: 2020-02-28 | Stop reason: SDUPTHER

## 2019-05-20 ENCOUNTER — OFFICE VISIT (OUTPATIENT)
Dept: ORTHOPEDIC SURGERY | Facility: CLINIC | Age: 24
End: 2019-05-20

## 2019-05-20 VITALS — BODY MASS INDEX: 22.5 KG/M2 | HEIGHT: 63 IN | HEART RATE: 83 BPM | WEIGHT: 126.98 LBS | OXYGEN SATURATION: 98 %

## 2019-05-20 DIAGNOSIS — M75.91 SUPRASPINATUS TENDINITIS, RIGHT: Primary | ICD-10-CM

## 2019-05-20 PROCEDURE — 99213 OFFICE O/P EST LOW 20 MIN: CPT | Performed by: ORTHOPAEDIC SURGERY

## 2019-05-20 NOTE — PROGRESS NOTES
[facility]    Subjective     Chief Complaint   Patient presents with   • Right Shoulder - Follow-up     MRI review 5/14/19        HPI      Elicia Arndt is a 24 y.o. female.  She is follow-up MRI of the right shoulder.  She says she is doing about the same.  Pain is 8 out of 10 at times.  She recently started physical therapy.  She takes ibuprofen 600 mg.      History reviewed. No pertinent past medical history.   History reviewed. No pertinent surgical history.   History reviewed. No pertinent family history.  Social History     Socioeconomic History   • Marital status:      Spouse name: Not on file   • Number of children: Not on file   • Years of education: Not on file   • Highest education level: Not on file   Tobacco Use   • Smoking status: Current Every Day Smoker   • Smokeless tobacco: Never Used   Substance and Sexual Activity   • Alcohol use: No     Frequency: Never      Current Outpatient Medications on File Prior to Visit   Medication Sig Dispense Refill   • butalbital-aspirin-caffeine (FIORINAL) -40 MG per capsule Take 1 capsule by mouth Every 4 (Four) Hours As Needed for Headache. 30 capsule 3   • sertraline (ZOLOFT) 100 MG tablet Take 1 tablet by mouth Daily. 30 tablet 4     No current facility-administered medications on file prior to visit.       Allergies   Allergen Reactions   • Adhesive Tape Hives     Red and hives          The following portions of the patient's history were reviewed and updated as appropriate: allergies, current medications, past family history, past medical history, past social history, past surgical history and problem list.    Review of Systems   Constitutional: Positive for activity change.   HENT: Negative.    Eyes: Negative.    Respiratory: Negative.    Cardiovascular: Negative.    Gastrointestinal: Negative.    Endocrine: Negative.    Genitourinary: Negative.    Musculoskeletal: Positive for arthralgias.   Skin: Negative.   "  Allergic/Immunologic: Negative.    Neurological: Negative.    Hematological: Negative.    Psychiatric/Behavioral: Negative.         Objective      Physical Exam  Pulse 83   Ht 160 cm (63\")   Wt 57.6 kg (126 lb 15.8 oz)   SpO2 98%   Breastfeeding? No   BMI 22.49 kg/m²     Body mass index is 22.49 kg/m².        GENERAL APPEARANCE: awake, alert & oriented x 3, in no acute distress and well developed, well nourished  PSYCH: normal mood and affect    Musculoskeletal   Upper Extremity   Right Shoulder     Inspection and Palpation:     Tenderness - none    Crepitus - none    Sensation is normal    Examination reveals no ecchymosis.      Strength and Tone:    Supraspinatus,  Infraspinatus - 5/5    Subscapularis - 5/5    Deltoid - 5/5     Range of Motion   Left shoulder:    Internal Rotation: ROM - T7    External Rotation: AROM - 80 degrees    Elevation through flexion: AROM - 180 degrees    Right Shoulder:    Internal Rotation: ROM - T7    External Rotation: AROM - 80 degrees    Elevation through flexion: AROM - 180 degrees     Abduction -180 degrees     Instability   Right shoulder    Sulcus sign negative    Apprehension test negative    Tawny relocation test negative    Jerk test negative   Impingement   Right shoulder    Kirkland impingement test positive    Neer impingement test positive   Functional Testing   Right shoulder    AC crossover adduction test negative    Abdominal compression test negative    Lift-off sign negative    Speed's test negative    Thurman's test negative    Horriblower's sign negative       Imaging/Studies  Imaging Results (last 7 days)     ** No results found for the last 168 hours. **        I viewed her MRI from May 14 which shows thickening of her rotator cuff consistent with tendinopathy  Assessment/Plan        ICD-10-CM ICD-9-CM   1. Supraspinatus tendinitis, right M75.91 726.10       Orders Placed This Encounter   Procedures   • Ambulatory Referral to Physical Therapy      She will do " physical therapy.  I offered her a cortisone injection.  She says she does not like shots.  She will continue the 600 mg ibuprofen every 8 hours.  She will follow-up in 4 weeks.  She is working full duty.    Medical Decision Making  Management Options : over-the-counter medicine, prescription/IM medicine and physical/occupational therapy  Data/Risk: radiology tests and independent visualization of imaging, lab tests, or EMG/NCV    Ted Ryan MD  05/20/19  9:54 AM         EMR Dragon/Transcription disclaimer:  Much of this encounter note is an electronic transcription of spoken language to printed text. Electronic transcription of spoken language may permit erroneous, or at times, nonsensical words or phrases to be inadvertently transcribed. Although I have reviewed the note for such errors, some may still exist.

## 2019-05-21 ENCOUNTER — APPOINTMENT (OUTPATIENT)
Dept: PHYSICAL THERAPY | Facility: HOSPITAL | Age: 24
End: 2019-05-21

## 2019-05-23 ENCOUNTER — HOSPITAL ENCOUNTER (OUTPATIENT)
Dept: PHYSICAL THERAPY | Facility: HOSPITAL | Age: 24
Setting detail: THERAPIES SERIES
Discharge: HOME | End: 2019-05-23

## 2019-05-23 DIAGNOSIS — M25.511 RIGHT SHOULDER PAIN, UNSPECIFIED CHRONICITY: Primary | ICD-10-CM

## 2019-05-23 PROCEDURE — 97140 MANUAL THERAPY 1/> REGIONS: CPT | Performed by: PHYSICAL THERAPIST

## 2019-05-23 PROCEDURE — 97110 THERAPEUTIC EXERCISES: CPT | Performed by: PHYSICAL THERAPIST

## 2019-05-23 NOTE — THERAPY TREATMENT NOTE
Outpatient Physical Therapy Ortho Treatment Note  [facility]     Patient Name: Elicia Arndt  : 1995  MRN: [id number]  Today's Date: 2019      Visit Date: 2019    Visit Dx:    ICD-10-CM ICD-9-CM   1. Right shoulder pain, unspecified chronicity M25.511 719.41       Patient Active Problem List   Diagnosis   • Constipation   • Pain in female pelvis   • Depression with anxiety   • Hx of migraine headaches        No past medical history on file.     No past surgical history on file.    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 She has been compliant with her HEP and feels the exercises are helpful and do not cause pain. She admitted that she would prefer to have surgery because PT seems difficult but stated she would be compliant nonetheless.   -LS       Precautions and Contraindications    Precautions/Limitations  no known precautions/limitations  -LS       Subjective Pain    Able to rate subjective pain?  yes  -LS    Pre-Treatment Pain Level  9  -LS    Post-Treatment Pain Level  9  -LS       Shoulder Girdle Palpation    Supraspinatus Insertion  Right:;Tender  -LS       Right Upper Ext    Rt Shoulder Abduction AROM  97  -LS    Rt Shoulder Flexion AROM  140  -LS    Rt Shoulder External Rotation AROM  70  -LS    Rt Shoulder Internal Rotation AROM  T9  -LS    Rt Upper Extremity Comments   PROM WFL  -LS      User Key  (r) = Recorded By, (t) = Taken By, (c) = Cosigned By    Initials Name Provider Type    LS Mitul Phillips PT Physical Therapist                      PT Assessment/Plan     Row Name 19 09          PT Assessment    Assessment Comments  Pt presented to PT with an inc in R shoulder pain rated 9/10 following several consecutive days of weedeating. She was encouraged to discontinue weedeating due to the  rhythmic stability required from the damaged RC, though she stated she would most likely continue the chore. MT was tolerated very well today and PROM was WFL with only a mild pain at end-range. She demonstrated some improvements in AROM in flexion in abduction and elevation limitations are likely due to RC weakness. CKC wall slides resulted in full ROM and were tolerated well. She reported some N/T in her hand with MT which I believe is due to median and ulnar nn tightness. She was shown nerve glides which were tolerated very well. Overall, the pt looked better today though she reported inc pain. I expect her motion and function to improve as her RC strength increases.   -LS        PT Plan    PT Plan Comments  Continue progression of periscapular and RC strengthening as tolerated.   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 She has been compliant with her HEP and feels the exercises are helpful and do not cause pain. She admitted that she would prefer to have surgery because PT seems difficult but stated she would be compliant nonetheless.   -LS         Subjective Pain    Able to rate subjective pain?  yes  -LS      Pre-Treatment Pain Level  9  -LS      Post-Treatment Pain Level  9  -LS         Total Minutes    92899 - PT Therapeutic Exercise Minutes  35  -LS      68619 - PT Manual Therapy Minutes  10  -LS         Exercise 1    Exercise Name 1  TE per external documentation to review and progress HEP and to introduce stretching and nerve mobs.   -        User Key  (r) = Recorded By,  (t) = Taken By, (c) = Cosigned By    Initials Name Provider Type    Mitul Jones PT Physical Therapist                      Manual Rx (last 36 hours)      Manual Treatments     Row Name 05/23/19 0900             Total Minutes    50327 - PT Manual Therapy Minutes  10  -LS         Manual Rx 1    Manual Rx 1 Location  R shoulder   -LS      Manual Rx 1 Type  Inferior jt mobs with PROM into abd and flex  -LS      Manual Rx 1 Grade  3  -LS        User Key  (r) = Recorded By, (t) = Taken By, (c) = Cosigned By    Initials Name Provider Type    Mitul Jones PT Physical Therapist                             Time Calculation:   Start Time: 0920  Therapy Charges for Today     Code Description Service Date Service Provider Modifiers Qty    85862964841  PT THER PROC EA 15 MIN 5/23/2019 Mitul Phillips, PT GP 2    [id number]  PT MANUAL THERAPY EA 15 MIN 5/23/2019 Mitul Phillips, PT GP 1                    Mitul Phillips PT  5/23/2019

## 2019-05-28 ENCOUNTER — HOSPITAL ENCOUNTER (OUTPATIENT)
Dept: PHYSICAL THERAPY | Facility: HOSPITAL | Age: 24
Setting detail: THERAPIES SERIES
Discharge: HOME | End: 2019-05-28

## 2019-05-28 DIAGNOSIS — M25.511 RIGHT SHOULDER PAIN, UNSPECIFIED CHRONICITY: Primary | ICD-10-CM

## 2019-05-28 PROCEDURE — 97110 THERAPEUTIC EXERCISES: CPT | Performed by: PHYSICAL THERAPIST

## 2019-05-28 NOTE — THERAPY TREATMENT NOTE
Outpatient Physical Therapy Ortho Treatment Note  [facility]     Patient Name: Elicia Arndt  : 1995  MRN: [id number]  Today's Date: 2019      Visit Date: 2019    Visit Dx:    ICD-10-CM ICD-9-CM   1. Right shoulder pain, unspecified chronicity M25.511 719.41       Patient Active Problem List   Diagnosis   • Constipation   • Pain in female pelvis   • Depression with anxiety   • Hx of migraine headaches        No past medical history on file.     No past surgical history on file.    PT Ortho     Row Name 19 0900       Subjective Comments    Subjective Comments  Pt stated that her shoulder was very sore following a long day at work yesterday. She withheld from weedeating since her last visit though she plans to continue yard work this week. She has been compliant with her HEP and feels they are going well. She stated that she was very tired upon presentation.   -LS       Precautions and Contraindications    Precautions/Limitations  no known precautions/limitations  -LS       Subjective Pain    Able to rate subjective pain?  yes  -LS    Pre-Treatment Pain Level  0  -LS    Post-Treatment Pain Level  1  -LS       Shoulder Girdle Palpation    Teres Minor  Right:;Tender;Guarded/taut Posterior corner  -LS       Right Upper Ext    Rt Shoulder Abduction AROM  105  -LS    Rt Shoulder Flexion AROM  140  -LS    Rt Shoulder External Rotation AROM  75  -LS    Rt Shoulder Internal Rotation AROM  T7  -LS      User Key  (r) = Recorded By, (t) = Taken By, (c) = Cosigned By    Initials Name Provider Type    Mitul Jones PT Physical Therapist                      PT Assessment/Plan     Row Name 19 0900          PT Assessment    Assessment Comments  Pt presented to PT with notable lethargy due to a poor night of sleep. Her shoulder pain was rated significanlty lower than it has been in previous visits though she noted some soreness in the posterior corner mm. PROM is full and no joint restrictions  were noted with manual assessment so MT was withheld. AROM is most prominently lacking in abduction, which I believe is due to RC weakness, as PROM is full and CKC AROM is far greater than OKC. She had no shoulder pain with exercise and her HEP was progressed to include CKC abduction at the wall. RS exercises were introduced and tolerated well, though significant weakness and poor timing of mm activation was observed. Following treatment, shoulder pain was mildly inc and pt requested ice which resulted in dec pain.   -LS        PT Plan    PT Plan Comments  Continue progression of RC strengthening in the [facility] with progressions of RS as tolerated.   -LS       User Key  (r) = Recorded By, (t) = Taken By, (c) = Cosigned By    Initials Name Provider Type    Mitul Jones, PT Physical Therapist          Modalities     Row Name 05/28/19 0900             Ice    Ice Applied  Yes  -LS      Location  R shoulder   -LS      Rx Minutes  10 mins  -LS      Ice S/P Rx  Yes  -LS        User Key  (r) = Recorded By, (t) = Taken By, (c) = Cosigned By    Initials Name Provider Type    Mitul Jones PT Physical Therapist        Exercises     Row Name 05/28/19 0900             Precautions    Existing Precautions/Restrictions  no known precautions/restrictions  -LS         Subjective Comments    Subjective Comments  Pt stated that her shoulder was very sore following a long day at work yesterday. She withheld from weedeating since her last visit though she plans to continue yard work this week. She has been compliant with her HEP and feels they are going well. She stated that she was very tired upon presentation.   -LS         Subjective Pain    Able to rate subjective pain?  yes  -LS      Pre-Treatment Pain Level  0  -LS      Post-Treatment Pain Level  1  -LS         Total Minutes    51418 - PT Therapeutic Exercise Minutes  43  -LS         Exercise 1    Exercise Name 1  TE per external documentation to strengthen the RC in the [facility];  introduction of RS  -LS        User Key  (r) = Recorded By, (t) = Taken By, (c) = Cosigned By    Initials Name Provider Type    Mitul Jones, PT Physical Therapist                                          Time Calculation:   Start Time: 0900  Therapy Charges for Today     Code Description Service Date Service Provider Modifiers Qty    [id number]  PT THER PROC EA 15 MIN 5/28/2019 Mitul Phillips, PT GP 3                    Mitul Phillips PT  5/28/2019

## 2019-05-30 ENCOUNTER — HOSPITAL ENCOUNTER (OUTPATIENT)
Dept: PHYSICAL THERAPY | Facility: HOSPITAL | Age: 24
Setting detail: THERAPIES SERIES
Discharge: HOME | End: 2019-05-30

## 2019-05-30 DIAGNOSIS — M25.511 RIGHT SHOULDER PAIN, UNSPECIFIED CHRONICITY: Primary | ICD-10-CM

## 2019-05-30 PROCEDURE — 97110 THERAPEUTIC EXERCISES: CPT | Performed by: PHYSICAL THERAPIST

## 2019-05-30 NOTE — THERAPY TREATMENT NOTE
Outpatient Physical Therapy Ortho Treatment Note  [facility]     Patient Name: Elicia Arndt  : 1995  MRN: [id number]  Today's Date: 2019      Visit Date: 2019    Visit Dx:    ICD-10-CM ICD-9-CM   1. Right shoulder pain, unspecified chronicity M25.511 719.41       Patient Active Problem List   Diagnosis   • Constipation   • Pain in female pelvis   • Depression with anxiety   • Hx of migraine headaches        No past medical history on file.     No past surgical history on file.    PT Ortho     Row Name 19 0800       Subjective Comments    Subjective Comments  Pt stated that her shoulder was very painful upon presentation due to a strenuous day of work yesterday. She had to perform several pt transfers and reported that her shoulder soreness inc throughout the day. She did not perform her HEP yesterday due to pain and fatigue. She attempted to manage pain with ice and ibuprofen but stated it was largely unsuccessful.   -LS       Precautions and Contraindications    Precautions/Limitations  no known precautions/limitations  -LS       Subjective Pain    Able to rate subjective pain?  yes  -LS    Pre-Treatment Pain Level  8  -LS    Post-Treatment Pain Level  8  -LS       Shoulder Girdle Palpation    Teres Minor  Right:;Tender;Guarded/taut Post corner tightness and TTP  -LS       Right Upper Ext    Rt Shoulder Abduction AROM  105  -LS    Rt Shoulder Flexion AROM  140  -LS    Rt Shoulder External Rotation AROM  75  -LS    Rt Shoulder Internal Rotation AROM  T7  -LS    Row Name 19 0900       Subjective Comments    Subjective Comments  Pt stated that her shoulder was very sore following a long day at work yesterday. She withheld from weedeating since her last visit though she plans to continue yard work this week. She has been compliant with her HEP and feels they are going well. She stated that she was very tired upon presentation.   -LS       Precautions and Contraindications     Precautions/Limitations  no known precautions/limitations  -LS       Subjective Pain    Able to rate subjective pain?  yes  -LS    Pre-Treatment Pain Level  0  -LS    Post-Treatment Pain Level  1  -LS       Shoulder Girdle Palpation    Teres Minor  Right:;Tender;Guarded/taut Posterior corner  -LS       Right Upper Ext    Rt Shoulder Abduction AROM  105  -LS    Rt Shoulder Flexion AROM  140  -LS    Rt Shoulder External Rotation AROM  75  -LS    Rt Shoulder Internal Rotation AROM  T7  -LS      User Key  (r) = Recorded By, (t) = Taken By, (c) = Cosigned By    Initials Name Provider Type    Mitul Jones PT Physical Therapist                      PT Assessment/Plan     Row Name 05/30/19 0800          PT Assessment    Assessment Comments  Pt presented to PT with complaints of inc shoulder pain following a day of strenuous work. She remained very fatigued and seemed unmotivated to exercise. She tolerated exercise progressions for RC strength and stability very well with no complaints of pain during exercise though she continued to express high pain levels once treatment was over. Her shoulder motion in the [facility] with wall slides continues to improve and was WFL today. She continues to display mild early lateral slide of the R scapula with elevation though this has improved with scapular strengthening activities. With the exception of pain, pt appears to be progressing very well and her function is improving as expected.   -        PT Plan    PT Plan Comments  Consider transition to [facility] RS strengthening exercises as tolerated. Continue strengthening of the scapular retractors.   -LS       User Key  (r) = Recorded By, (t) = Taken By, (c) = Cosigned By    Initials Name Provider Type    Mitul Jones PT Physical Therapist            Exercises     Row Name 05/30/19 0800             Precautions    Existing Precautions/Restrictions  no known precautions/restrictions  -LS         Subjective Comments    Subjective Comments   Pt stated that her shoulder was very painful upon presentation due to a strenuous day of work yesterday. She had to perform several pt transfers and reported that her shoulder soreness inc throughout the day. She did not perform her HEP yesterday due to pain and fatigue. She attempted to manage pain with ice and ibuprofen but stated it was largely unsuccessful.   -LS         Subjective Pain    Able to rate subjective pain?  yes  -LS      Pre-Treatment Pain Level  8  -LS      Post-Treatment Pain Level  8  -LS         Total Minutes    47622 - PT Therapeutic Exercise Minutes  42  -LS         Exercise 1    Exercise Name 1  TE per external documentation to strengthen the RC in the [facility]; continuation of RS  -LS        User Key  (r) = Recorded By, (t) = Taken By, (c) = Cosigned By    Initials Name Provider Type    Mitul Jones PT Physical Therapist                                          Time Calculation:   Start Time: 0858  Therapy Charges for Today     Code Description Service Date Service Provider Modifiers Qty    07397423475  PT THER PROC EA 15 MIN 5/30/2019 Mitul Phillips, PT GP 3                    Mitul Phillips PT  5/30/2019

## 2019-06-04 ENCOUNTER — HOSPITAL ENCOUNTER (OUTPATIENT)
Dept: PHYSICAL THERAPY | Facility: HOSPITAL | Age: 24
Setting detail: THERAPIES SERIES
Discharge: HOME | End: 2019-06-04

## 2019-06-04 ENCOUNTER — TRANSCRIBE ORDERS (OUTPATIENT)
Dept: PHYSICAL THERAPY | Facility: HOSPITAL | Age: 24
End: 2019-06-04

## 2019-06-04 DIAGNOSIS — M75.101 SUPRASPINATUS SYNDROME OF RIGHT SHOULDER: Primary | ICD-10-CM

## 2019-06-04 DIAGNOSIS — M25.511 RIGHT SHOULDER PAIN, UNSPECIFIED CHRONICITY: Primary | ICD-10-CM

## 2019-06-04 PROCEDURE — 97140 MANUAL THERAPY 1/> REGIONS: CPT | Performed by: PHYSICAL THERAPIST

## 2019-06-04 PROCEDURE — 97110 THERAPEUTIC EXERCISES: CPT | Performed by: PHYSICAL THERAPIST

## 2019-06-04 NOTE — THERAPY TREATMENT NOTE
Outpatient Physical Therapy Ortho Treatment Note  [facility]     Patient Name: Elicia Arndt  : 1995  MRN: [id number]  Today's Date: 2019      Visit Date: 2019    Visit Dx:    ICD-10-CM ICD-9-CM   1. Right shoulder pain, unspecified chronicity M25.511 719.41       Patient Active Problem List   Diagnosis   • Constipation   • Pain in female pelvis   • Depression with anxiety   • Hx of migraine headaches        No past medical history on file.     No past surgical history on file.    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 She has had continued pain since that time which she described as 10/10. She noted that her motion is decreased due to pain and she is having trouble performing her basic ADLs. Popping and clicking has been reported in the last few days but she does not feel unstable. She pinpointed the location of her pain to her supraspinatus. N/T has been increased and fairly constant throughout her R hand. She became tearful several times throughout therapy due to pain and stated she is frustrated that she is not better. She is worried that she has a new injury.   -LS       Precautions and Contraindications    Precautions/Limitations  no known precautions/limitations  -LS       Subjective Pain    Able to rate subjective pain?  yes  -LS    Pre-Treatment Pain Level  10  -LS    Post-Treatment Pain Level  10  -LS       Shoulder Girdle Palpation    Supraspinatus Insertion  Right:;Tender  -LS       Right Upper Ext    Rt Shoulder Abduction AROM  87  -LS    Rt Shoulder Flexion AROM  110  -LS    Rt Shoulder Flexion PROM  130 mm guarding   -LS    Rt Shoulder External Rotation AROM  70  -LS    Rt Shoulder Internal Rotation AROM  T7  -LS      User Key  (r) = Recorded By, (t) = Taken By, (c) = Cosigned By    Initials Name  Provider Type    Mitul Jones, PT Physical Therapist                      PT Assessment/Plan     Row Name 06/04/19 0900          PT Assessment    Assessment Comments  Pt presented to PT with a significant inc in R shoulder pain following an exacerbation of her injury that occurred on Friday while assisting with a pt transfer at work. She appeared to be in significant pain during therapy and tolerated exercise and MT poorly, frequently tearing up and requesting time to compose herself. The supraspinatus was focally and exquisitely TTP throughout the mm belly and its insertion and pt described the palpation as the familiar pain. She was very guarded with PROM and did not tolerate it well at all, particularly into elevation. She experienced N/T throughout her R hand during the majority of treatment and it was unchanged with positioning or nerve glides in the clinic. Her neck was assessed again for cervical involvement but all tests were negative. I believe that the pt has likely exacerbated her injury or partially torn her SS tendon. She was encouraged to return to her initial HEP to maintain AROM and minimize excessive stress on the tendon. I wish to utilize ionto with dexamethasone next visit and have placed an order to be signed by her referring physician.   -        PT Plan    PT Plan Comments  Reasssess pt's shoulder next visit and establish POC in accordance. Utilize ionto for reduction of pain and inflammation. Resume RC and scapular strengthening exercises as tolerated.   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 She has had continued pain since that time which she described as 10/10. She noted that her motion is decreased due to pain and she is having trouble performing her basic ADLs. Popping and clicking has been reported in the last few days but she does not feel unstable. She pinpointed the location of her pain to her supraspinatus. N/T has been increased and fairly constant throughout her R hand. She became tearful several times throughout therapy due to pain and stated she is frustrated that she is not better. She is worried that she has a new injury.   -LS         Subjective Pain    Able to rate subjective pain?  yes  -LS      Pre-Treatment Pain Level  10  -LS      Post-Treatment Pain Level  10  -LS         Total Minutes    44910 - PT Therapeutic Exercise Minutes  30  -LS      43060 - PT Manual Therapy Minutes  15  -LS         Exercise 1    Exercise Name 1  TE per external flowsheet for low-level RC activation and ROM of the shoulder.   -LS        User Key  (r) = Recorded By, (t) = Taken By, (c) = Cosigned By    Initials Name Provider Type    Mitul Jones PT Physical Therapist                      Manual Rx (last 36 hours)      Manual Treatments     Row Name 06/04/19 0900             Total Minutes    30038 - PT Manual Therapy Minutes  15  -LS         Manual Rx 1    Manual Rx 1 Location  R shoulder   -LS      Manual Rx 1 Type  Light jt oscillations and PROM to dec mm guarding   -LS      Manual Rx 1 Grade  1/2  -LS         Manual Rx 2    Manual Rx 2 Location  R shoulder   -LS      Manual Rx 2 Type  Manual assessment and palpation.   -LS        User Key  (r) = Recorded By, (t) = Taken By, (c) = Cosigned By    Initials Name Provider Type    Mitul Jones PT Physical Therapist                             Time Calculation:   Start Time: 0900  Therapy Charges for Today     Code Description Service Date Service Provider Modifiers Qty    31207752707  PT THER PROC EA 15 MIN  6/4/2019 Mitul Phillips, PT GP 2    [id number]  PT MANUAL THERAPY EA 15 MIN 6/4/2019 Mitul Phillips, PT GP 1                    Mitul Phillips, PT  6/4/2019

## 2019-06-06 ENCOUNTER — HOSPITAL ENCOUNTER (OUTPATIENT)
Dept: PHYSICAL THERAPY | Facility: HOSPITAL | Age: 24
Setting detail: THERAPIES SERIES
Discharge: HOME | End: 2019-06-06

## 2019-06-06 PROCEDURE — 97110 THERAPEUTIC EXERCISES: CPT | Performed by: PHYSICAL THERAPIST

## 2019-06-06 PROCEDURE — 97140 MANUAL THERAPY 1/> REGIONS: CPT | Performed by: PHYSICAL THERAPIST

## 2019-06-06 NOTE — THERAPY PROGRESS REPORT/RE-CERT
Outpatient Physical Therapy Ortho Progress Note   [place]     Patient Name: Elicia Arndt  : 1995  MRN: [id number]  Today's Date: 2019      Visit Date: 2019    Visit Dx:  No diagnosis found.    Patient Active Problem List   Diagnosis   • Constipation   • Pain in female pelvis   • Depression with anxiety   • Hx of migraine headaches        No past medical history on file.     No past surgical history on file.    PT Ortho     Row Name 19 0900       Subjective Comments    Subjective Comments  Pt stated that her shoulder pain has remained elevated since her last appointment with no notable relief. She has been compliant with her HEP despite pain. She is interested in having ionto treatment with dexamethasone but stated she has an allergy to adhesives. When told that the patch had adhesives, pt stated she did not care and wanted the treatment anyway.   -LS       Precautions and Contraindications    Precautions/Limitations  no known precautions/limitations  -LS       Subjective Pain    Able to rate subjective pain?  yes  -LS    Pre-Treatment Pain Level  10  -LS    Post-Treatment Pain Level  10  -LS       Shoulder Impingement/Rotator Cuff Special Tests    Kirkland-Jamarcus Test (RC Lesion vs. Bursitis)  Right:;Positive  -LS    Neer Impingement Test (RC Lesion vs. Bursitis)  Right:;Positive  -LS    Full Can Test (RC Lesion)  Right:;Positive  -LS    Empty Can Test (RC Lesion)  Right:;Positive  -LS       Shoulder Girdle Palpation    Supraspinatus Insertion  Right:;Tender  -LS    Long Head of Biceps  Right:;Tender  -LS       Right Upper Ext    Rt Shoulder Abduction AROM  75  -LS    Rt Shoulder Flexion AROM  124  -LS    Rt Shoulder Flexion PROM  140  -LS    Rt Shoulder External Rotation AROM  67  -LS    Rt Shoulder Internal Rotation AROM  T7  -LS       MMT Right Upper Ext    Rt Shoulder Flexion MMT, Gross Movement  (4/5) good with pain   -LS    Rt Shoulder ABduction MMT, Gross Movement  (4/5) good  with pain   -LS    Rt Shoulder Internal Rotation MMT, Gross Movement  (4+/5) good plus  -LS    Rt Shoulder External Rotation MMT, Gross Movement  (4+/5) good plus  -LS    Row Name 06/04/19 0900       Subjective Comments    Subjective Comments  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 She has had continued pain since that time which she described as 10/10. She noted that her motion is decreased due to pain and she is having trouble performing her basic ADLs. Popping and clicking has been reported in the last few days but she does not feel unstable. She pinpointed the location of her pain to her supraspinatus. N/T has been increased and fairly constant throughout her R hand. She became tearful several times throughout therapy due to pain and stated she is frustrated that she is not better. She is worried that she has a new injury.   -LS       Precautions and Contraindications    Precautions/Limitations  no known precautions/limitations  -LS       Subjective Pain    Able to rate subjective pain?  yes  -LS    Pre-Treatment Pain Level  10  -LS    Post-Treatment Pain Level  10  -LS       Shoulder Girdle Palpation    Supraspinatus Insertion  Right:;Tender  -LS       Right Upper Ext    Rt Shoulder Abduction AROM  87  -LS    Rt Shoulder Flexion AROM  110  -LS    Rt Shoulder Flexion PROM  130 mm guarding   -LS    Rt Shoulder External Rotation AROM  70  -LS    Rt Shoulder Internal Rotation AROM  T7  -LS      User Key  (r) = Recorded By, (t) = Taken By, (c) = Cosigned By    Initials Name Provider Type    Mitul Jones PT Physical Therapist                      PT Assessment/Plan     Row Name 06/06/19 0900          PT Assessment    Functional Limitations  Limitations in community activities;Performance in work activities;Limitation in home management;Performance in self-care ADL;Performance in leisure activities;Limitations in functional  capacity and performance  -LS     Impairments  Muscle strength;Pain;Peripheral nerve integrity;Range of motion;Impaired muscle endurance  -LS     Assessment Comments  Pt had been making slow but steady progress with PT for treatment of chronic R shoulder pain until last week when she sustained a re-injury at work while helping transfer a pt. Since that time, she has reported severe constant pain in the SS and has demonstrated significant decreases in AROM. She is not tolerating treatment well and has become tearful numerous times in the last two treatment sessions. I am concerned she has partially torn her SS as the pain and CONNIE is not consistent with tendinitis, for which she was being treated. Pt requested a trial of ionto with dexa today and was informed on the precuations and risks and was agreeable treatment. This was placed over the SS tendon and she was instructed to remove it after 2 hours. I am concerned with the pt's current state and her rehab potential and will likely refer her back to her physician if she does not show improved pain levels and tolerance with trial of ionto and continuation of light HEP.   -LS     Please refer to paper survey for additional self-reported information  Yes  -LS     Rehab Potential  Fair  -LS     Patient/caregiver participated in establishment of treatment plan and goals  Yes  -LS     Patient would benefit from skilled therapy intervention  Yes  -LS        PT Plan    PT Frequency  2x/week  -LS     Predicted Duration of Therapy Intervention (Therapy Eval)  8 weeks  -LS     Planned CPT's?  PT EVAL LOW COMPLEXITY: 04638;PT THER ACT EA 15 MIN: 79793;PT SELF CARE/HOME MGMT/TRAIN EA 15: 36743;PT THER PROC EA 15 MIN: 27878;PT NEUROMUSC RE-EDUCATION EA 15 MIN: 77736;PT RE-EVAL: 23951;PT MANUAL THERAPY EA 15 MIN: 68897;PT HOT/COLD PACK WC NONMCARE: 58805;PT IONTOPHORESIS EA 15 MIN: 60521;PT ELECTRICAL STIM UNATTEND:   -LS     PT Plan Comments  Pt will be reassessed next visit  and treatment will be resumed as tolerated. If tolerance is not improved, she will be referred back to her physician.   -LS       User Key  (r) = Recorded By, (t) = Taken By, (c) = Cosigned By    Initials Name Provider Type    Mitul Jones PT Physical Therapist          Modalities     Row Name 06/06/19 0900             Iontophoresis 84822    MA/Min  80  -LS      Dexamethasone used  Yes  -LS      Patch Type  Butterfly  -LS      31063 - PT Iontophoresis Minutes  5  -LS        User Key  (r) = Recorded By, (t) = Taken By, (c) = Cosigned By    Initials Name Provider Type    Mitul Jones PT Physical Therapist        Exercises     Row Name 06/06/19 0900             Precautions    Existing Precautions/Restrictions  no known precautions/restrictions  -LS         Subjective Comments    Subjective Comments  Pt stated that her shoulder pain has remained elevated since her last appointment with no notable relief. She has been compliant with her HEP despite pain. She is interested in having ionto treatment with dexamethasone but stated she has an allergy to adhesives. When told that the patch had adhesives, pt stated she did not care and wanted the treatment anyway.   -LS         Subjective Pain    Able to rate subjective pain?  yes  -LS      Pre-Treatment Pain Level  10  -LS      Post-Treatment Pain Level  10  -LS         Total Minutes    41040 - PT Therapeutic Exercise Minutes  15  -LS      03145 - PT Manual Therapy Minutes  10  -LS         Exercise 1    Exercise Name 1  TE per external flowsheet including light ROM of the shoulder.   -LS         Exercise 2    Exercise Name 2  Reassessment   -LS        User Key  (r) = Recorded By, (t) = Taken By, (c) = Cosigned By    Initials Name Provider Type    Mitul Jones PT Physical Therapist                      Manual Rx (last 36 hours)      Manual Treatments     Row Name 06/06/19 0900             Total Minutes    92832 - PT Manual Therapy Minutes  10  -LS         Manual Rx  1    Manual Rx 1 Location  R shoulder   -LS      Manual Rx 1 Type  Light jt oscillations and PROM to dec mm guarding   -LS      Manual Rx 1 Grade  1/2  -LS        User Key  (r) = Recorded By, (t) = Taken By, (c) = Cosigned By    Initials Name Provider Type    Mitul Jones, PT Physical Therapist          PT OP Goals     Row Name 06/06/19 0900          PT Short Term Goals    STG Date to Achieve  06/06/19  -LS     STG 1  client will demonstrate independence with initial HEP  -LS     STG 1 Progress  Met  -LS     STG 2  client will demonstrate increase AROM elevation to 150 degrees  -LS     STG 2 Progress  Ongoing  -LS     STG 3  Client will demonstrate increase in strength to 4/5   -LS     STG 3 Progress  Met  -LS        Long Term Goals    LTG Date to Achieve  07/04/19  -LS     LTG 1  client will demonstrate full AROM right shoulder   -LS     LTG 1 Progress  Ongoing  -LS     LTG 2  client will demonstrate >/= 4+/5 strength RUE  -LS     LTG 2 Progress  Ongoing  -LS     LTG 3  client will report qDASH limited <50  -LS     LTG 3 Progress  Ongoing  -LS       User Key  (r) = Recorded By, (t) = Taken By, (c) = Cosigned By    Initials Name Provider Type    Mitul Jones PT Physical Therapist                         Time Calculation:   Start Time: 0900  Therapy Charges for Today     Code Description Service Date Service Provider Modifiers Qty    09147885826 HC PT THER PROC EA 15 MIN 6/6/2019 Mitul Phillips, PT GP 1    [id number] HC PT MANUAL THERAPY EA 15 MIN 6/6/2019 Mitul Phillips, PT GP 1                    Mitul Phillips PT  6/6/2019

## 2019-06-11 ENCOUNTER — HOSPITAL ENCOUNTER (OUTPATIENT)
Dept: PHYSICAL THERAPY | Facility: HOSPITAL | Age: 24
Setting detail: THERAPIES SERIES
Discharge: HOME | End: 2019-06-11

## 2019-06-11 DIAGNOSIS — M25.511 RIGHT SHOULDER PAIN, UNSPECIFIED CHRONICITY: Primary | ICD-10-CM

## 2019-06-11 PROCEDURE — 97110 THERAPEUTIC EXERCISES: CPT | Performed by: PHYSICAL THERAPIST

## 2019-06-11 NOTE — THERAPY TREATMENT NOTE
Outpatient Physical Therapy Ortho Treatment Note  [facility]     Patient Name: Elicia Arndt  : 1995  MRN: [id number]  Today's Date: 2019      Visit Date: 2019    Visit Dx:    ICD-10-CM ICD-9-CM   1. Right shoulder pain, unspecified chronicity M25.511 719.41       Patient Active Problem List   Diagnosis   • Constipation   • Pain in female pelvis   • Depression with anxiety   • Hx of migraine headaches        No past medical history on file.     No past surgical history on file.    PT Ortho     Row Name 19 0900       Subjective Comments    Subjective Comments  Pt stated that her shoulder pain continued to be significantly elevated as compared to her visits before her reinjury. She has been compliant with her HEP but stated they remain difficult. She reported that she has been putting forth more effort in her rehab lately and hopes to see improvement as a result. She follows up with her orthopedist next week.  -LS       Precautions and Contraindications    Precautions/Limitations  no known precautions/limitations  -LS       Subjective Pain    Able to rate subjective pain?  yes  -LS    Pre-Treatment Pain Level  9  -LS    Post-Treatment Pain Level  10  -LS       Right Upper Ext    Rt Shoulder Abduction AROM  100 145 PROM  -LS    Rt Shoulder Flexion AROM  138  -LS    Rt Shoulder Flexion PROM  150  -LS    Rt Shoulder External Rotation AROM  82 95 PROM  -LS    Rt Shoulder Internal Rotation AROM  T7 65 PROM 90/90  -LS      User Key  (r) = Recorded By, (t) = Taken By, (c) = Cosigned By    Initials Name Provider Type    Mitul Jones PT Physical Therapist                      PT Assessment/Plan     Row Name 19 0900          PT Assessment    Assessment Comments  Pt presented to PT with a slight decrease in R shoulder pain but the pain rating of 9/10 indicated continued severe pain levels. Her AROM was significantly improved in all directions but was painful at end-range. PROM was full  but also painful in flexion and abduction. Due to inc ROM observed today, CKC flexion and abduction on the wall was resumed to improve RC strength and endurance. Her scapular motion was appropriate today and she had no winging or elevation with UE elevation. She tolerated return to light RC strengthening mildly well and the quality of motion was appropriate but she had inc pain following exercise. It remains likely that she sustained a partial tear to the RC based on her decreased AROM and pain with movement.   -LS        PT Plan    PT Plan Comments  Pt will be seeing her orthopedist next week prior to her next visit with me. I will see her after her f/u to restablish POC.  -LS       User Key  (r) = Recorded By, (t) = Taken By, (c) = Cosigned By    Initials Name Provider Type    Mitul Jones PT Physical Therapist            Exercises     Row Name 06/11/19 0900             Precautions    Existing Precautions/Restrictions  no known precautions/restrictions  -LS         Subjective Comments    Subjective Comments  Pt stated that her shoulder pain continued to be significantly elevated as compared to her visits before her reinjury. She has been compliant with her HEP but stated they remain difficult. She reported that she has been putting forth more effort in her rehab lately and hopes to see improvement as a result. She follows up with her orthopedist next week.  -LS         Subjective Pain    Able to rate subjective pain?  yes  -LS      Pre-Treatment Pain Level  9  -LS      Post-Treatment Pain Level  10  -LS         Total Minutes    51378 - PT Therapeutic Exercise Minutes  30  -LS      49949 - PT Manual Therapy Minutes  5  -LS         Exercise 1    Exercise Name 1  TE per external flowsheet to resume CKC AROM exercises and introduction to light RC eccentrics.  -LS        User Key  (r) = Recorded By, (t) = Taken By, (c) = Cosigned By    Initials Name Provider Type    Mitul Jones PT Physical Therapist                       Manual Rx (last 36 hours)      Manual Treatments     Row Name 06/11/19 0900             Total Minutes    22587 - PT Manual Therapy Minutes  5  -LS         Manual Rx 1    Manual Rx 1 Location  R shoulder   -LS      Manual Rx 1 Type  Light jt oscillations and PROM to dec mm guarding   -LS         Manual Rx 2    Manual Rx 2 Location  R shoulder   -LS      Manual Rx 2 Type  Manual assessment and palpation.   -LS        User Key  (r) = Recorded By, (t) = Taken By, (c) = Cosigned By    Initials Name Provider Type    LS Mitul Phillips, PT Physical Therapist                             Time Calculation:   Start Time: 0900  Therapy Charges for Today     Code Description Service Date Service Provider Modifiers Qty    36945589410 HC PT THER PROC EA 15 MIN 6/11/2019 Mitul Phillips, PT GP 2                    Mitul Phillips PT  6/11/2019

## 2019-06-17 ENCOUNTER — OFFICE VISIT (OUTPATIENT)
Dept: ORTHOPEDIC SURGERY | Facility: CLINIC | Age: 24
End: 2019-06-17

## 2019-06-17 VITALS — HEIGHT: 63 IN | BODY MASS INDEX: 21.29 KG/M2 | OXYGEN SATURATION: 98 % | HEART RATE: 86 BPM | WEIGHT: 120.15 LBS

## 2019-06-17 DIAGNOSIS — M25.511 ACUTE PAIN OF RIGHT SHOULDER: Primary | ICD-10-CM

## 2019-06-17 DIAGNOSIS — M75.91 SUPRASPINATUS TENDINITIS, RIGHT: ICD-10-CM

## 2019-06-17 PROCEDURE — 99214 OFFICE O/P EST MOD 30 MIN: CPT | Performed by: PHYSICIAN ASSISTANT

## 2019-06-17 PROCEDURE — 20610 DRAIN/INJ JOINT/BURSA W/O US: CPT | Performed by: PHYSICIAN ASSISTANT

## 2019-06-17 RX ORDER — TRIAMCINOLONE ACETONIDE 40 MG/ML
40 INJECTION, SUSPENSION INTRA-ARTICULAR; INTRAMUSCULAR
Status: COMPLETED | OUTPATIENT
Start: 2019-06-17 | End: 2019-06-17

## 2019-06-17 RX ORDER — LIDOCAINE HYDROCHLORIDE 10 MG/ML
4 INJECTION, SOLUTION EPIDURAL; INFILTRATION; INTRACAUDAL; PERINEURAL
Status: COMPLETED | OUTPATIENT
Start: 2019-06-17 | End: 2019-06-17

## 2019-06-17 RX ADMIN — TRIAMCINOLONE ACETONIDE 40 MG: 40 INJECTION, SUSPENSION INTRA-ARTICULAR; INTRAMUSCULAR at 08:41

## 2019-06-17 RX ADMIN — LIDOCAINE HYDROCHLORIDE 4 ML: 10 INJECTION, SOLUTION EPIDURAL; INFILTRATION; INTRACAUDAL; PERINEURAL at 08:41

## 2019-06-17 NOTE — PROGRESS NOTES
Procedure   Large Joint Arthrocentesis: R subacromial bursa  Date/Time: 6/17/2019 8:41 AM  Consent given by: patient  Site marked: site marked  Timeout: Immediately prior to procedure a time out was called to verify the correct patient, procedure, equipment, support staff and site/side marked as required   Supporting Documentation  Indications: pain   Procedure Details  Location: shoulder - R subacromial bursa  Preparation: Patient was prepped and draped in the usual sterile fashion  Needle size: 22 G  Approach: posterior  Medications administered: 4 mL lidocaine PF 1% 1 %; 40 mg triamcinolone acetonide 40 MG/ML  Patient tolerance: patient tolerated the procedure well with no immediate complications

## 2019-06-17 NOTE — PROGRESS NOTES
"    [facility] Note    Subjective     CC: Follow-up (4 week follow up - supraspinatus tendinitis, right )      RENE Arndt is a 24 y.o. female.  Patient returns today for follow-up evaluation of her right shoulder.  She is previously been seen by Dr. Ryan who diagnosed with right shoulder rotator cuff tendinitis.  She is been undergoing physical therapy and taking ibuprofen.  She states she is done physical therapy twice a week but recently had a drop down to 1 time per week secondary to the pain.  She still does the exercises at home 2 times per day.  At this time she endorses a pain scale maximum 10/10.  Severity the pain moderate to severe.  Quality the pain burning, stabbing, shooting.  Associated symptoms popping, stiffness.  Symptoms are worse with anything that she does regarding range of motion or use of her right arm/shoulder.  No numbness or tingling into the extremity.      ROS:    Constiutional:Pt denies fever, chills, nausea, or vomiting.  MSK:as above    Objective      Past Medical History  History reviewed. No pertinent past medical history.      Physical Exam  Pulse 86   Ht 160 cm (62.99\")   Wt 54.5 kg (120 lb 2.4 oz)   SpO2 98%   BMI 21.29 kg/m²     Body mass index is 21.29 kg/m².    Patient is well nourished and well developed.        Ortho Exam  Musculoskeletal   Upper Extremity   Right Shoulder     Inspection and Palpation:     Tenderness -positive tenderness throughout the shoulder with increased pain noted along the posterior and lateral aspect of the shoulder into the upper trap area.    Crepitus - none    Sensation is normal    Examination reveals no ecchymosis.        Strength and Tone:    Supraspinatus -4/5    External Rotators-4/5    Infraspinatus - 4/5    Subscapularis - 5/5    Deltoid - 5/5     Range of Motion   Right Shoulder:    Internal Rotation: ROM - T10    External Rotation: AROM - 80 degrees    Elevation through flexion: AROM - 150 degrees "     Abduction: AROM - 100 degrees     Instability   Right shoulder    Sulcus sign negative    Apprehension test negative    Tawny relocation test negative    Jerk test negative     Impingement   Right shoulder    Kirkland-Jamarcus impingement test positive    Neer impingement test positive     Functional Testing   Right shoulder    AC crossover adduction test negative    Abdominal compression test negative    Lift-off sign negative    Speed's test negative    Thurman's test negative    Hornblower's sign negative       Imaging/Labs/EMG Reviewed:    Imaging Results (last 24 hours)     ** No results found for the last 24 hours. **      None today.      Assessment:  1. Acute pain of right shoulder    2. Supraspinatus tendinitis, right        Plan:  1. Right shoulder pain with supraspinatus tendinitis.  2. Patient was offered and accepted a subacromial corticosteroid injection.  Injection was given today.  3. Continue working with formal PT.  4. Continue taking 600 mg ibuprofen as directed.  5. Follow-up in 4 weeks with Dr. Ryan for repeat evaluation.  6. Question and concerns answered.    After discussing the risks, benefits, indications of injection, the patient gave consent to proceed.  Her right shoulder subacromial space was confirmed as the correct site to be injected with a timeout.  It was then prepped using Hibiclens and injected with a mixture of 4 cc of 1% plain lidocaine and 1 cc of Kenalog (40 mg per mL) without any resistance through the posterior lateral approach, patient in seated position.  Area was cleaned, hemostasis was achieved and a Band-Aid was applied over the injection site.  The patient tolerated procedure well.  I instructed the patient on signs and symptoms of infection.  They should report to the emergency department or return to clinic if any of these develop, for further evaluation and treatment.  Recommended modifying activity for the next 48 hours to include rest, ice, elevation and oral  pain medication as needed.        Medical Decision Making  Management Options : prescription/IM medicine and physical/occupational therapy      Nisa Ruiz PA-C  06/17/19  9:30 AM

## 2019-06-20 ENCOUNTER — HOSPITAL ENCOUNTER (OUTPATIENT)
Dept: PHYSICAL THERAPY | Facility: HOSPITAL | Age: 24
Setting detail: THERAPIES SERIES
Discharge: HOME | End: 2019-06-20

## 2019-06-20 DIAGNOSIS — M25.511 RIGHT SHOULDER PAIN, UNSPECIFIED CHRONICITY: Primary | ICD-10-CM

## 2019-06-20 PROCEDURE — 97110 THERAPEUTIC EXERCISES: CPT | Performed by: PHYSICAL THERAPIST

## 2019-06-20 NOTE — THERAPY TREATMENT NOTE
Outpatient Physical Therapy Ortho Treatment Note  [facility]     Patient Name: Elicia Arndt  : 1995  MRN: [id number]  Today's Date: 2019      Visit Date: 2019    Visit Dx:    ICD-10-CM ICD-9-CM   1. Right shoulder pain, unspecified chronicity M25.511 719.41       Patient Active Problem List   Diagnosis   • Constipation   • Pain in female pelvis   • Depression with anxiety   • Hx of migraine headaches        No past medical history on file.     No past surgical history on file.    PT Ortho     Row Name 19 09       Subjective Comments    Subjective Comments  Pt stated that she had a cortisone injection to the R shoulder on Monday and reported that her pain is now 3 times worse. She wished to proceed with therapy today but stated she did not know if she would tolerate much. She has remained compliant with her HEP.   -LS       Precautions and Contraindications    Precautions/Limitations  no known precautions/limitations  -LS       Subjective Pain    Able to rate subjective pain?  yes  -LS    Pre-Treatment Pain Level  10  -LS       Right Upper Ext    Rt Shoulder Abduction AROM  90  -LS    Rt Shoulder Flexion AROM  130  -LS    Rt Shoulder External Rotation AROM  78  -LS    Rt Shoulder Internal Rotation AROM  T7  -LS      User Key  (r) = Recorded By, (t) = Taken By, (c) = Cosigned By    Initials Name Provider Type    Mitul Jones PT Physical Therapist                      PT Assessment/Plan     Row Name 19          PT Assessment    Assessment Comments  Pt presented to PT with inc pain in the R shoulder, despite receiving a cortisone injection 3 days ago. She continued to have a lethargic attitude towards rehab and required frequent cues for appropriate performance and continuation of her exercises. Treatment emphasis was on eccentric activation of the SS with slow lowering exercises and PNF activities. She tolerated these exercises moderately well but reported continued  discomfort, which was expected. In supine, her UE elevation appeared full and I believe that weakness and pain limit her function in standing more than joint abnormalities.   -LS        PT Plan    PT Plan Comments  Continue ecc RC activities as tolerated, progressing reps as able.   -       User Key  (r) = Recorded By, (t) = Taken By, (c) = Cosigned By    Initials Name Provider Type    Mitul Jones PT Physical Therapist            Exercises     Row Name 06/20/19 0900             Precautions    Existing Precautions/Restrictions  no known precautions/restrictions  -LS         Subjective Comments    Subjective Comments  Pt stated that she had a cortisone injection to the R shoulder on Monday and reported that her pain is now 3 times worse. She wished to proceed with therapy today but stated she did not know if she would tolerate much. She has remained compliant with her HEP.   -LS         Subjective Pain    Able to rate subjective pain?  yes  -LS      Pre-Treatment Pain Level  10  -LS         Total Minutes    18977 - PT Therapeutic Exercise Minutes  38  -LS         Exercise 1    Exercise Name 1  TE per external documentation emphasizing ecc RC activities.   -        User Key  (r) = Recorded By, (t) = Taken By, (c) = Cosigned By    Initials Name Provider Type    Mitul Jones PT Physical Therapist                                          Time Calculation:   Start Time: 0900  Therapy Charges for Today     Code Description Service Date Service Provider Modifiers Qty    44204643906  PT THER PROC EA 15 MIN 6/20/2019 Mitul Phillips, PT GP 3                    Mitul Phillips PT  6/20/2019

## 2019-06-25 ENCOUNTER — HOSPITAL ENCOUNTER (OUTPATIENT)
Dept: PHYSICAL THERAPY | Facility: HOSPITAL | Age: 24
Setting detail: THERAPIES SERIES
Discharge: HOME | End: 2019-06-25

## 2019-06-25 DIAGNOSIS — M25.511 RIGHT SHOULDER PAIN, UNSPECIFIED CHRONICITY: Primary | ICD-10-CM

## 2019-06-25 PROCEDURE — 97110 THERAPEUTIC EXERCISES: CPT | Performed by: PHYSICAL THERAPIST

## 2019-06-25 NOTE — THERAPY TREATMENT NOTE
Outpatient Physical Therapy Ortho Treatment Note  [facility]     Patient Name: Elicia Arndt  : 1995  MRN: [id number]  Today's Date: 2019      Visit Date: 2019    Visit Dx:    ICD-10-CM ICD-9-CM   1. Right shoulder pain, unspecified chronicity M25.511 719.41       Patient Active Problem List   Diagnosis   • Constipation   • Pain in female pelvis   • Depression with anxiety   • Hx of migraine headaches        No past medical history on file.     No past surgical history on file.    PT Ortho     Row Name 19 0800       Subjective Comments    Subjective Comments  Pt stated that her shoulder pain has not changed and remains severe. She does not feel that she has gotten any relief from the cortisone injection. She reported crying at work due to pain last week.   -LS       Precautions and Contraindications    Precautions/Limitations  no known precautions/limitations  -LS       Subjective Pain    Able to rate subjective pain?  yes  -LS    Pre-Treatment Pain Level  9  -LS    Post-Treatment Pain Level  9  -LS       Shoulder Girdle Palpation    Supraspinatus Insertion  Right:;Tender  -LS       Right Upper Ext    Rt Shoulder Abduction AROM  108  -LS    Rt Shoulder Flexion AROM  128  -LS    Rt Shoulder External Rotation AROM  80  -LS    Rt Shoulder Internal Rotation AROM  T7  -LS      User Key  (r) = Recorded By, (t) = Taken By, (c) = Cosigned By    Initials Name Provider Type    Mitul Jones PT Physical Therapist                      PT Assessment/Plan     Row Name 19 0800          PT Assessment    Assessment Comments  The pt presented to PT with no change in shoulder symptoms and pain levels remain severe. She continued to push through discomfort to complete ecc RC strengthening exercises though they were performed with low reps and high sets to allow rest breaks. She has shown minimal signs of progress in recent weeks and I feel her potential for successful rehab is limited. She did  not respond well to her cortisone injection last week and I am interested in having her reassessed by her orthopedist.   -        PT Plan    PT Plan Comments  I have contacted the pt's orthopedist with an update on her status in PT. I will continue ecc RC exercises as tolerated when she returns for PT.  -       User Key  (r) = Recorded By, (t) = Taken By, (c) = Cosigned By    Initials Name Provider Type    Mitul Jones PT Physical Therapist            Exercises     Row Name 06/25/19 0800             Precautions    Existing Precautions/Restrictions  no known precautions/restrictions  -LS         Subjective Comments    Subjective Comments  Pt stated that her shoulder pain has not changed and remains severe. She does not feel that she has gotten any relief from the cortisone injection. She reported crying at work due to pain last week.   -LS         Subjective Pain    Able to rate subjective pain?  yes  -LS      Pre-Treatment Pain Level  9  -LS      Post-Treatment Pain Level  9  -LS         Total Minutes    11928 - PT Therapeutic Exercise Minutes  30  -LS         Exercise 1    Exercise Name 1  TE per external documentation emphasizing ecc RC activities.   -        User Key  (r) = Recorded By, (t) = Taken By, (c) = Cosigned By    Initials Name Provider Type    Mitul Jones PT Physical Therapist                                          Time Calculation:   Start Time: 0845  Therapy Charges for Today     Code Description Service Date Service Provider Modifiers Qty    [id number]  PT THER PROC EA 15 MIN 6/25/2019 Mitul Phillips, PT GP 2                    Mitul Phillips PT  6/25/2019

## 2019-07-09 ENCOUNTER — HOSPITAL ENCOUNTER (OUTPATIENT)
Dept: PHYSICAL THERAPY | Facility: HOSPITAL | Age: 24
Setting detail: THERAPIES SERIES
Discharge: HOME | End: 2019-07-09

## 2019-07-09 DIAGNOSIS — M25.511 RIGHT SHOULDER PAIN, UNSPECIFIED CHRONICITY: Primary | ICD-10-CM

## 2019-07-09 PROCEDURE — 97110 THERAPEUTIC EXERCISES: CPT | Performed by: PHYSICAL THERAPIST

## 2019-07-09 NOTE — THERAPY PROGRESS REPORT/RE-CERT
Outpatient Physical Therapy Ortho Progress Note  [facility]     Patient Name: Elicia Arndt  : 1995  MRN: [id number]  Today's Date: 2019      Visit Date: 2019    Visit Dx:    ICD-10-CM ICD-9-CM   1. Right shoulder pain, unspecified chronicity M25.511 719.41       Patient Active Problem List   Diagnosis   • Constipation   • Pain in female pelvis   • Depression with anxiety   • Hx of migraine headaches        No past medical history on file.     No past surgical history on file.    PT Ortho     Row Name 19 0700       Subjective Comments    Subjective Comments  The pt stated that she was feeling much better and believes her cortisone injection has taken effect. She has had ongoing relief for appx 1 week and stated she has tolerated work activities much better. She has remained compliant with her HEP and reported they have been much easier since she has begun feeling better. She continues to report weakness.   -LS       Precautions and Contraindications    Precautions/Limitations  no known precautions/limitations  -LS       Subjective Pain    Able to rate subjective pain?  yes  -LS    Pre-Treatment Pain Level  0  -LS    Post-Treatment Pain Level  0  -LS       Shoulder Impingement/Rotator Cuff Special Tests    Kirkland-Jamarcus Test (RC Lesion vs. Bursitis)  Right:;Positive  -LS    Neer Impingement Test (RC Lesion vs. Bursitis)  Right:;Positive  -LS    Full Can Test (RC Lesion)  Right:;Negative  -LS    Empty Can Test (RC Lesion)  Right:;Positive  -LS       Shoulder Girdle Palpation    Supraspinatus Insertion  Right:;Tender  -LS       Right Upper Ext    Rt Shoulder Abduction AROM  130  -LS    Rt Shoulder Flexion AROM  150  -LS    Rt Shoulder External Rotation AROM  82  -LS    Rt Shoulder Internal Rotation AROM  T7  -LS       MMT Right Upper Ext    Rt Shoulder Flexion MMT, Gross Movement  (4+/5) good plus  -LS    Rt Shoulder ABduction MMT, Gross Movement  (4+/5) good plus  -LS    Rt Shoulder  Internal Rotation MMT, Gross Movement  (4+/5) good plus  -LS    Rt Shoulder External Rotation MMT, Gross Movement  (4+/5) good plus  -LS      User Key  (r) = Recorded By, (t) = Taken By, (c) = Cosigned By    Initials Name Provider Type    Mitul Jones, PT Physical Therapist                      PT Assessment/Plan     Row Name 07/09/19 0700          PT Assessment    Functional Limitations  Limitations in community activities;Performance in work activities;Limitation in home management;Performance in self-care ADL;Performance in leisure activities;Limitations in functional capacity and performance  -LS     Impairments  Impaired muscle endurance;Range of motion;Poor body mechanics;Pain;Muscle strength  -LS     Assessment Comments  The pt presented to PT with no pain in her shoulder for the first time since beginning treatment. She has had shoulder pain for several years and was cautioned not overuse the shoulder because it is feeling well right now. She was educated on the healing process and the continued importance of rehab. Eccentric activities were tolerated much better today and will likely be an emphasis on therapy moving forward. Her ROM was significantly improved and was WFL today with no pain at end-range. She continues to have positive impingement tests and TTP in the SS but overall pain is decreased. RS exercises to improve stability and propioception of the joint was tolerated well but was noticably fatiguing. She continues to display lethargy during treatment and is unintentional with many of her exercises. She was encouraged to be intentional with each exercise to ensure proper technique and maximal benefit.   -LS     Please refer to paper survey for additional self-reported information  Yes  -LS     Rehab Potential  Excellent  -LS     Patient/caregiver participated in establishment of treatment plan and goals  Yes  -LS     Patient would benefit from skilled therapy intervention  Yes  -LS        PT  Plan    PT Frequency  1x/week  -LS     Predicted Duration of Therapy Intervention (Therapy Eval)  8 weeks  -LS     Planned CPT's?  PT EVAL LOW COMPLEXITY: 35332;PT THER ACT EA 15 MIN: 45489;PT SELF CARE/HOME MGMT/TRAIN EA 15: 82036;PT THER PROC EA 15 MIN: 07209;PT NEUROMUSC RE-EDUCATION EA 15 MIN: 18369;PT RE-EVAL: 58776;PT MANUAL THERAPY EA 15 MIN: 90781;PT HOT/COLD PACK WC NONMCARE: 89972;PT ELECTRICAL STIM UNATTEND: ;PT IONTOPHORESIS EA 15 MIN: 76243  -LS     PT Plan Comments  Continue exercise progressions including ecc RC activities and RS for improved proprioception.   -LS       User Key  (r) = Recorded By, (t) = Taken By, (c) = Cosigned By    Initials Name Provider Type    Mitul Jones, PT Physical Therapist            Exercises     Row Name 07/09/19 0700             Precautions    Existing Precautions/Restrictions  no known precautions/restrictions  -LS         Subjective Comments    Subjective Comments  The pt stated that she was feeling much better and believes her cortisone injection has taken effect. She has had ongoing relief for appx 1 week and stated she has tolerated work activities much better. She has remained compliant with her HEP and reported they have been much easier since she has begun feeling better. She continues to report weakness.   -LS         Subjective Pain    Able to rate subjective pain?  yes  -LS      Pre-Treatment Pain Level  0  -LS      Post-Treatment Pain Level  0  -LS         Total Minutes    67021 - PT Therapeutic Exercise Minutes  40  -LS         Exercise 1    Exercise Name 1  TE per external documentation emphasizing ecc RC activities and RS.   -LS         Exercise 2    Exercise Name 2  Reassessment   -LS        User Key  (r) = Recorded By, (t) = Taken By, (c) = Cosigned By    Initials Name Provider Type    Mitul Jones, PT Physical Therapist                       PT OP Goals     Row Name 07/09/19 0700          PT Short Term Goals    STG Date to Achieve  06/06/19   -LS     STG 1  client will demonstrate independence with initial HEP  -LS     STG 1 Progress  Met  -LS     STG 2  client will demonstrate increase AROM elevation to 150 degrees  -LS     STG 2 Progress  Met  -LS     STG 3  Client will demonstrate increase in strength to 4/5   -LS     STG 3 Progress  Met  -LS        Long Term Goals    LTG Date to Achieve  07/04/19  -LS     LTG 1  client will demonstrate full AROM right shoulder   -LS     LTG 1 Progress  Ongoing  -LS     LTG 2  client will demonstrate >/= 4+/5 strength RUE  -LS     LTG 2 Progress  Met  -LS     LTG 3  client will report qDASH limited <50  -LS     LTG 3 Progress  Met  -LS       User Key  (r) = Recorded By, (t) = Taken By, (c) = Cosigned By    Initials Name Provider Type    Mitul Jones PT Physical Therapist               Outcome Measure Options: Quick DASH  Quick DASH  Open a tight or new jar.: No Difficulty  Do heavy household chores (e.g., wash walls, wash floors): No Difficulty  Carry a shopping bag or briefcase: No Difficulty  Wash your back: No Difficulty  Use a knife to cut food: No Difficulty  Recreational activities in which you take some force or impact through your arm, should or hand (e.g. golf, hammering, tennis, etc.): No Difficulty  During the past week, to what extent has your arm, shoulder, or hand problem interfered with your normal social activites with family, friends, neighbors or groups?: Not at all  During the past week, were you limited in your work or other regular daily activities as a result of your arm, shoulder or hand problem?: Not limited at all  Arm, Shoulder, or hand pain: None  Tingling (pins and needles) in your arm, shoulder, or hand: None  During the past week, how much difficulty have you had sleeping because of the pain in your arm, shoulder or hand?: No difficulty  Number of Questions Answered: 11  Quick DASH Score: 0         Time Calculation:   Start Time: 0730  Therapy Charges for Today     Code Description  Service Date Service Provider Modifiers Qty    04014610347 HC PT THER PROC EA 15 MIN 7/9/2019 Mitul Phillips, PT GP 3          PT G-Codes  Outcome Measure Options: Quick DASH  Quick DASH Score: 0         Mitul Phillips, PT  7/9/2019

## 2019-07-15 ENCOUNTER — OFFICE VISIT (OUTPATIENT)
Dept: ORTHOPEDIC SURGERY | Facility: CLINIC | Age: 24
End: 2019-07-15

## 2019-07-15 VITALS — OXYGEN SATURATION: 99 % | HEIGHT: 63 IN | WEIGHT: 120.15 LBS | HEART RATE: 98 BPM | BODY MASS INDEX: 21.29 KG/M2

## 2019-07-15 DIAGNOSIS — M75.91 SUPRASPINATUS TENDINITIS, RIGHT: Primary | ICD-10-CM

## 2019-07-15 PROCEDURE — 99212 OFFICE O/P EST SF 10 MIN: CPT | Performed by: ORTHOPAEDIC SURGERY

## 2019-07-15 NOTE — PROGRESS NOTES
[facility] [place] Orthopaedic Surgery Clinic Note    Subjective     Chief Complaint   Patient presents with   • Right Shoulder - Follow-up     Injection 06/17/2019        HPI  Elicia Arndt is a 24 y.o. female.  She is doing better with her right shoulder.  She says she had an injection last visit.  She has less pain.  She does physical therapy.  She is not on restrictions.    History reviewed. No pertinent past medical history.   No past surgical history on file.   History reviewed. No pertinent family history.  Social History     Socioeconomic History   • Marital status:      Spouse name: Not on file   • Number of children: Not on file   • Years of education: Not on file   • Highest education level: Not on file   Tobacco Use   • Smoking status: Current Every Day Smoker   • Smokeless tobacco: Never Used   Substance and Sexual Activity   • Alcohol use: No     Frequency: Never      Current Outpatient Medications on File Prior to Visit   Medication Sig Dispense Refill   • butalbital-aspirin-caffeine (FIORINAL) -40 MG per capsule Take 1 capsule by mouth Every 4 (Four) Hours As Needed for Headache. 30 capsule 3   • sertraline (ZOLOFT) 100 MG tablet Take 1 tablet by mouth Daily. 30 tablet 4     No current facility-administered medications on file prior to visit.       Allergies   Allergen Reactions   • Adhesive Tape Hives     Red and hives          The following portions of the patient's history were reviewed and updated as appropriate: allergies, current medications, past family history, past medical history, past social history, past surgical history and problem list.    Review of Systems   Constitutional: Negative.    HENT: Negative.    Eyes: Negative.    Respiratory: Negative.    Cardiovascular: Negative.    Gastrointestinal: Negative.    Endocrine: Negative.    Genitourinary: Negative.    Musculoskeletal: Positive for arthralgias.   Skin: Negative.    Allergic/Immunologic: Negative.    Neurological: Negative.   "  Hematological: Negative.    Psychiatric/Behavioral: Negative.         Objective      Physical Exam  Pulse 98   Ht 160 cm (62.99\")   Wt 54.5 kg (120 lb 2.4 oz)   SpO2 99%   BMI 21.29 kg/m²     Body mass index is 21.29 kg/m².    GENERAL APPEARANCE: awake, alert & oriented x 3, in no acute distress and well developed, well nourished  PSYCH: normal mood and affect    Ortho Exam  Musculoskeletal   Upper Extremity   Right Shoulder     Inspection and Palpation:     Tenderness - none    Crepitus - none    Sensation is normal    Examination reveals no ecchymosis.      Strength and Tone:    Supraspinatus,  Infraspinatus - 5/5    Subscapularis - 5/5    Deltoid - 5/5     Range of Motion   Left shoulder:    Internal Rotation: ROM - T7    External Rotation: AROM - 80 degrees    Elevation through flexion: AROM - 180 degrees    Right Shoulder:    Internal Rotation: ROM - T7    External Rotation: AROM - 80 degrees    Elevation through flexion: AROM - 180 degrees     Abduction -180 degrees     Instability   Right shoulder    Sulcus sign negative    Apprehension test negative    Tawny relocation test negative    Jerk test negative   Impingement   Right shoulder    Kirkland impingement test positive    Neer impingement test positive   Functional Testing   Right shoulder    AC crossover adduction test negative    Abdominal compression test negative    Lift-off sign negative    Speed's test negative    Thurman's test negative    Horriblower's sign negative       Imaging/Studies  Imaging Results (last 7 days)     ** No results found for the last 168 hours. **          Assessment/Plan        ICD-10-CM ICD-9-CM   1. Supraspinatus tendinitis, right M75.91 726.10       Orders Placed This Encounter   Procedures   • Ambulatory Referral to Physical Therapy Evaluate and treat, Ortho      She is doing better.  She will finish out her physical therapy and follow-up as needed.  All her questions were answered.  Medical Decision Making  Management " Options : over-the-counter medicine and physical/occupational therapy      Ted Ryan MD  07/15/19  8:29 AM         EMR Dragon/Transcription disclaimer:  Much of this encounter note is an electronic transcription of spoken language to printed text. Electronic transcription of spoken language may permit erroneous, or at times, nonsensical words or phrases to be inadvertently transcribed. Although I have reviewed the note for such errors, some may still exist.

## 2019-07-23 ENCOUNTER — HOSPITAL ENCOUNTER (OUTPATIENT)
Dept: PHYSICAL THERAPY | Facility: HOSPITAL | Age: 24
Setting detail: THERAPIES SERIES
Discharge: HOME | End: 2019-07-23

## 2019-07-23 DIAGNOSIS — M25.511 RIGHT SHOULDER PAIN, UNSPECIFIED CHRONICITY: Primary | ICD-10-CM

## 2019-07-23 PROCEDURE — 97110 THERAPEUTIC EXERCISES: CPT | Performed by: PHYSICAL THERAPIST

## 2019-07-23 NOTE — THERAPY TREATMENT NOTE
Outpatient Physical Therapy Ortho Treatment Note  [facility]     Patient Name: Elicia Arndt  : 1995  MRN: [id number]  Today's Date: 2019      Visit Date: 2019    Visit Dx:    ICD-10-CM ICD-9-CM   1. Right shoulder pain, unspecified chronicity M25.511 719.41       Patient Active Problem List   Diagnosis   • Constipation   • Pain in female pelvis   • Depression with anxiety   • Hx of migraine headaches        No past medical history on file.     No past surgical history on file.    PT Ortho     Row Name 19 0700       Subjective Comments    Subjective Comments  The pt stated that her shoulder pain has continued to be low and she has not had any exacerbations in the last 2 weeks. She has been compliant with her HEP and feels that many of them are now too easy. She has been able to perform all work duties with no limitations.   -LS       Precautions and Contraindications    Precautions/Limitations  no known precautions/limitations  -LS       Subjective Pain    Able to rate subjective pain?  yes  -LS    Pre-Treatment Pain Level  0  -LS    Post-Treatment Pain Level  0  -LS       Right Upper Ext    Rt Shoulder Abduction AROM  167  -LS    Rt Shoulder Flexion AROM  150  -LS    Rt Shoulder External Rotation AROM  85  -LS    Rt Shoulder Internal Rotation AROM  T7  -LS      User Key  (r) = Recorded By, (t) = Taken By, (c) = Cosigned By    Initials Name Provider Type    Mitul Jones PT Physical Therapist                      PT Assessment/Plan     Row Name 19 0802          PT Assessment    Assessment Comments  The pt presented to PT with a maintained pain-free state in her R shoulder following two weeks of independent management. Her HEP exercises have become too easy so the goal of today's treatment was to progress them for RC strength and stability through an inc ROM. She tolerated all progressions well although she remained lethargic. No pain was reproduced during treatment although  soreness was noted with eccentric RC lowering and with repeated ball drops for RC stabilization. She is doing very well at this time and I expect her to move to independence in the near future.   -LS        PT Plan    PT Plan Comments  Review the progressed HEP and discharge next visit if no inc in pain.   -       User Key  (r) = Recorded By, (t) = Taken By, (c) = Cosigned By    Initials Name Provider Type    Mitul Jones PT Physical Therapist            Exercises     Row Name 07/23/19 0700             Precautions    Existing Precautions/Restrictions  no known precautions/restrictions  -LS         Subjective Comments    Subjective Comments  The pt stated that her shoulder pain has continued to be low and she has not had any exacerbations in the last 2 weeks. She has been compliant with her HEP and feels that many of them are now too easy. She has been able to perform all work duties with no limitations.   -LS         Subjective Pain    Able to rate subjective pain?  yes  -LS      Pre-Treatment Pain Level  0  -LS      Post-Treatment Pain Level  0  -LS         Total Minutes    77573 - PT Therapeutic Exercise Minutes  38  -LS         Exercise 1    Exercise Name 1  TE per external documentation to progress and review HEP for RC strength and stability in the [facility].   -        User Key  (r) = Recorded By, (t) = Taken By, (c) = Cosigned By    Initials Name Provider Type    Mitul Jones PT Physical Therapist                                          Time Calculation:   Start Time: 0730  Therapy Charges for Today     Code Description Service Date Service Provider Modifiers Qty    50838131086  PT THER PROC EA 15 MIN 7/23/2019 Mitul Phillips, PT GP 3                    Mitul Phillips PT  7/23/2019

## 2019-07-30 ENCOUNTER — OFFICE VISIT (OUTPATIENT)
Dept: INTERNAL MEDICINE | Facility: CLINIC | Age: 24
End: 2019-07-30

## 2019-07-30 VITALS
SYSTOLIC BLOOD PRESSURE: 118 MMHG | TEMPERATURE: 97.5 F | WEIGHT: 130 LBS | HEART RATE: 84 BPM | RESPIRATION RATE: 18 BRPM | DIASTOLIC BLOOD PRESSURE: 80 MMHG | BODY MASS INDEX: 23.03 KG/M2

## 2019-07-30 DIAGNOSIS — F41.8 DEPRESSION WITH ANXIETY: ICD-10-CM

## 2019-07-30 DIAGNOSIS — G43.009 MIGRAINE WITHOUT AURA AND WITHOUT STATUS MIGRAINOSUS, NOT INTRACTABLE: Primary | ICD-10-CM

## 2019-07-30 PROCEDURE — 99214 OFFICE O/P EST MOD 30 MIN: CPT | Performed by: PHYSICIAN ASSISTANT

## 2019-07-30 RX ORDER — AMITRIPTYLINE HYDROCHLORIDE 10 MG/1
10 TABLET, FILM COATED ORAL NIGHTLY
Qty: 30 TABLET | Refills: 1 | Status: SHIPPED | OUTPATIENT
Start: 2019-07-30 | End: 2019-10-13 | Stop reason: SDUPTHER

## 2019-07-30 NOTE — PROGRESS NOTES
Chief Complaint   Patient presents with   • Depression     6 month follow up         Subjective       History of Present Illness     Elicia Arndt is a 24 y.o. female. She presents for follow up of depression and anxiety. Pt states her depression and anxiety have worsened in the last month. She has anxiety almost daily, and more low moods. Also reports less energy and increased fatigue. She is under more stress as they are finishing remodeling a new home, and she and her  and two young children are currently living with her in-laws. She has a good relationship with her in-laws but does want her own space for her family. Pt denies panic attacks, but has daily high level anxiety. This is not affecting her work performance, but she feels she is more easily agitated with her family. She denies thoughts of self-harm or SI. She is taking zoloft which was previously providing more relief from her symptoms.     Pt also reports worsening headaches x1 month. She is not sleeping well and believes this + increased stress is worsening her migraines. She is now having migraines 2-3 days/ week, and sometimes headaches in other days as well that do not develop into migraines. She has mild blurred vision, nausea, and occasional vomiting with migraines. She does take fioricet which helps at times, but no longer fully relieving her worst migraines. She has never been on prophylactic medication for migraines.       The following portions of the patient's history were reviewed and updated as appropriate: allergies, current medications, past medical history, past social history and problem list.    Allergies   Allergen Reactions   • Adhesive Tape Hives     Red and hives       Social History     Tobacco Use   • Smoking status: Current Every Day Smoker   • Smokeless tobacco: Never Used   Substance Use Topics   • Alcohol use: No     Frequency: Never         Current Outpatient Medications:   •  butalbital-aspirin-caffeine (FIORINAL)  -40 MG per capsule, Take 1 capsule by mouth Every 4 (Four) Hours As Needed for Headache., Disp: 30 capsule, Rfl: 3  •  sertraline (ZOLOFT) 100 MG tablet, Take 1 tablet by mouth Daily., Disp: 30 tablet, Rfl: 4  •  amitriptyline (ELAVIL) 10 MG tablet, Take 1 tablet by mouth Every Night., Disp: 30 tablet, Rfl: 1    Review of Systems   Constitutional: Negative for chills and fever.   HENT: Negative for sore throat and trouble swallowing.    Eyes: Negative for pain.   Respiratory: Negative for cough and shortness of breath.    Cardiovascular: Negative for chest pain and palpitations.   Gastrointestinal: Negative for abdominal pain, diarrhea, nausea and vomiting.   Genitourinary: Negative for dysuria.   Skin: Negative for rash.   Neurological: Positive for headache. Negative for dizziness, syncope and weakness.   Psychiatric/Behavioral: Positive for sleep disturbance, depressed mood and stress. Negative for self-injury and suicidal ideas. The patient is nervous/anxious.        Objective   Vitals:    07/30/19 1230   BP: 118/80   Pulse: 84   Resp: 18   Temp: 97.5 °F (36.4 °C)     Physical Exam   Constitutional: She appears well-developed and well-nourished.   HENT:   Head: Normocephalic and atraumatic.   Right Ear: Tympanic membrane, external ear and ear canal normal.   Left Ear: Tympanic membrane, external ear and ear canal normal.   Mouth/Throat: Oropharynx is clear and moist and mucous membranes are normal.   Eyes: Conjunctivae are normal.   Neck: No thyromegaly present.   Cardiovascular: Normal rate and regular rhythm.   No murmur heard.  Pulmonary/Chest: Effort normal and breath sounds normal. She has no wheezes. She has no rales.   Abdominal: Soft. There is no hepatosplenomegaly. There is no tenderness.   Lymphadenopathy:     She has no cervical adenopathy.   Skin: No rash noted.   Psychiatric: Her behavior is normal. Judgment and thought content normal. Her mood appears anxious. She exhibits a depressed mood.              Assessment/Plan   Elicia was seen today for depression.    Diagnoses and all orders for this visit:    Migraine without aura and without status migrainosus, not intractable  -     amitriptyline (ELAVIL) 10 MG tablet; Take 1 tablet by mouth Every Night.    Depression with anxiety  -     Ambulatory Referral to Psychology  - Continue Zoloft. Will consider alternative therapy at next visit.       Will trial low dose elavil for sleep and migraines, may also improve moods. Pt aware that taking zoloft and elavil may change her moods, and is to call with any concerning symptoms. Will consider topamax as I do not want to increase elavil substantially while she is on SSRI, if elavil 10mg or possibly 25mg at next visit does not improve migraines.   Continue Zoloft at this time. May need change in med at next visit.  Pt in agreement with counseling.          Return in about 4 weeks (around 8/27/2019) for Follow up.

## 2019-08-06 ENCOUNTER — HOSPITAL ENCOUNTER (OUTPATIENT)
Dept: PHYSICAL THERAPY | Facility: HOSPITAL | Age: 24
Setting detail: THERAPIES SERIES
Discharge: HOME | End: 2019-08-06

## 2019-08-06 DIAGNOSIS — M25.511 RIGHT SHOULDER PAIN, UNSPECIFIED CHRONICITY: Primary | ICD-10-CM

## 2019-08-06 PROCEDURE — 97110 THERAPEUTIC EXERCISES: CPT | Performed by: PHYSICAL THERAPIST

## 2019-08-06 NOTE — THERAPY DISCHARGE NOTE
"     Outpatient Physical Therapy Ortho Treatment Note/Discharge Summary  [facility]     Patient Name: Elicia Arndt  : 1995  MRN: [id number]  Today's Date: 2019      Visit Date: 2019    Visit Dx:    ICD-10-CM ICD-9-CM   1. Right shoulder pain, unspecified chronicity M25.511 719.41       Patient Active Problem List   Diagnosis   • Constipation   • Pain in female pelvis   • Depression with anxiety   • Migraine without aura and without status migrainosus, not intractable        No past medical history on file.     No past surgical history on file.    PT Ortho     Row Name 19 0700       Subjective Comments    Subjective Comments  The pt stated that she was feeling \"excellent\" ad reported no issues with her shoulder since her last visit. She feels she has fully recovered and she is agreeable to discharge.   -LS       Precautions and Contraindications    Precautions/Limitations  no known precautions/limitations  -LS       Subjective Pain    Able to rate subjective pain?  yes  -LS    Pre-Treatment Pain Level  0  -LS    Post-Treatment Pain Level  0  -LS       Shoulder Impingement/Rotator Cuff Special Tests    Kirkland-Jamarcus Test (RC Lesion vs. Bursitis)  Right:;Negative  -LS    Neer Impingement Test (RC Lesion vs. Bursitis)  Right:;Negative  -LS    Full Can Test (RC Lesion)  Right:;Negative  -LS    Empty Can Test (RC Lesion)  Right:;Positive  -LS       Shoulder Girdle Palpation    Supraspinatus Insertion  Right:;Tender  -LS       Right Upper Ext    Rt Shoulder Abduction AROM  170  -LS    Rt Shoulder Flexion AROM  155  -LS    Rt Shoulder External Rotation AROM  80  -LS    Rt Shoulder Internal Rotation AROM  T6  -LS       MMT Right Upper Ext    Rt Shoulder Flexion MMT, Gross Movement  (4+/5) good plus  -LS    Rt Shoulder ABduction MMT, Gross Movement  (5/5) normal  -LS    Rt Shoulder Internal Rotation MMT, Gross Movement  (5/5) normal  -LS    Rt Shoulder External Rotation MMT, Gross Movement  " "(4+/5) good plus  -      User Key  (r) = Recorded By, (t) = Taken By, (c) = Cosigned By    Initials Name Provider Type    Mitul Jones PT Physical Therapist                      PT Assessment/Plan     Row Name 08/06/19 0700          PT Assessment    Assessment Comments  The pt presented to PT with no complaints of shoulder pain and demonstrated appropriate ROM and strength. She has met all of her short and long term goals for PT and is appropriate for d/c at this time. Her progressed HEP was reviewed and practiced in the clinic with supervision and technique modifications were made as needed.  -LS        PT Plan    PT Plan Comments  Pt to be d/c.  -LS       User Key  (r) = Recorded By, (t) = Taken By, (c) = Cosigned By    Initials Name Provider Type    Mitul Jones PT Physical Therapist              Exercises     Row Name 08/06/19 0700             Precautions    Existing Precautions/Restrictions  no known precautions/restrictions  -LS         Subjective Comments    Subjective Comments  The pt stated that she was feeling \"excellent\" ad reported no issues with her shoulder since her last visit. She feels she has fully recovered and she is agreeable to discharge.   -LS         Subjective Pain    Able to rate subjective pain?  yes  -LS      Pre-Treatment Pain Level  0  -LS      Post-Treatment Pain Level  0  -LS         Total Minutes    05063 - PT Therapeutic Exercise Minutes  20  -LS         Exercise 1    Exercise Name 1  TE per external documentation to progress and review HEP for RC strength and stability in the [facility].   -LS        User Key  (r) = Recorded By, (t) = Taken By, (c) = Cosigned By    Initials Name Provider Type    Mitul Jones PT Physical Therapist                         PT OP Goals     Row Name 08/06/19 0700          PT Short Term Goals    STG Date to Achieve  06/06/19  -LS     STG 1  client will demonstrate independence with initial HEP  -LS     STG 1 Progress  Met  -     STG 2  client " will demonstrate increase AROM elevation to 150 degrees  -LS     STG 2 Progress  Met  -LS     STG 3  Client will demonstrate increase in strength to 4/5   -LS     STG 3 Progress  Met  -LS        Long Term Goals    LTG Date to Achieve  07/04/19  -LS     LTG 1  client will demonstrate full AROM right shoulder   -LS     LTG 1 Progress  Met  -LS     LTG 2  client will demonstrate >/= 4+/5 strength RUE  -LS     LTG 2 Progress  Met  -LS     LTG 3  client will report qDASH limited <50  -LS     LTG 3 Progress  Met  -LS       User Key  (r) = Recorded By, (t) = Taken By, (c) = Cosigned By    Initials Name Provider Type    Mitul Jones, PT Physical Therapist                         Time Calculation:   Start Time: 0730  Therapy Charges for Today     Code Description Service Date Service Provider Modifiers Qty    89331517564 HC PT THER PROC EA 15 MIN 8/6/2019 Mitul Phillips, PT GP 1                OP PT Discharge Summary  Date of Discharge: 08/06/19  Reason for Discharge: All goals achieved  Outcomes Achieved: Able to achieve all goals within established timeline  Discharge Destination: Home with home program  Discharge Instructions/Additional Comments: See assessment for further detail.      Mitul Phillips PT  8/6/2019

## 2019-08-22 ENCOUNTER — APPOINTMENT (OUTPATIENT)
Dept: PHYSICAL THERAPY | Facility: HOSPITAL | Age: 24
End: 2019-08-22

## 2019-08-29 ENCOUNTER — OFFICE VISIT (OUTPATIENT)
Dept: INTERNAL MEDICINE | Facility: CLINIC | Age: 24
End: 2019-08-29

## 2019-08-29 VITALS
DIASTOLIC BLOOD PRESSURE: 80 MMHG | SYSTOLIC BLOOD PRESSURE: 110 MMHG | RESPIRATION RATE: 16 BRPM | WEIGHT: 132.2 LBS | BODY MASS INDEX: 23.42 KG/M2 | HEIGHT: 63 IN | TEMPERATURE: 97.6 F | HEART RATE: 83 BPM | OXYGEN SATURATION: 99 %

## 2019-08-29 DIAGNOSIS — Z23 NEED FOR TDAP VACCINATION: ICD-10-CM

## 2019-08-29 DIAGNOSIS — F41.9 ANXIETY: ICD-10-CM

## 2019-08-29 DIAGNOSIS — G43.009 MIGRAINE WITHOUT AURA AND WITHOUT STATUS MIGRAINOSUS, NOT INTRACTABLE: Primary | ICD-10-CM

## 2019-08-29 PROCEDURE — 99214 OFFICE O/P EST MOD 30 MIN: CPT | Performed by: PHYSICIAN ASSISTANT

## 2019-08-29 PROCEDURE — 90471 IMMUNIZATION ADMIN: CPT | Performed by: PHYSICIAN ASSISTANT

## 2019-08-29 PROCEDURE — 90715 TDAP VACCINE 7 YRS/> IM: CPT | Performed by: PHYSICIAN ASSISTANT

## 2019-08-29 NOTE — PROGRESS NOTES
Chief Complaint   Patient presents with   • Migraine     4wk follow up       Subjective       History of Present Illness     Elicia Arndt is a 24 y.o. female. She presents for follow up of migraines. Pt has been taking Elavil nightly as directed and reports no migraines or even simple headaches since beginning this medication. She is also sleeping much better at night and feels less fatigued during the day. She is pleased with the results of this medication. She has no s/e from Elavil, no medication hangover in AM.    Continues to have anxiety but no panic attacks. The Zoloft does help with low moods, and most of her anxiety and remaining low moods are due to circumstances, per pt. She and her family are still living with in-laws while pt and her  work on updating their own home. Pt hopes to be in their own home in the next 1-2 months and believes this will also reduce her stress. Denies thoughts of self-harm or SI.     Needs Tdap update.   Pt has pap smear scheduled with [facility] in October 2019.       The following portions of the patient's history were reviewed and updated as appropriate: allergies, current medications, past medical history, past social history and problem list.    Allergies   Allergen Reactions   • Adhesive Tape Hives     Red and hives       Social History     Tobacco Use   • Smoking status: Current Every Day Smoker     Packs/day: 1.00     Years: 5.00     Pack years: 5.00   • Smokeless tobacco: Never Used   Substance Use Topics   • Alcohol use: No     Frequency: Never         Current Outpatient Medications:   •  amitriptyline (ELAVIL) 10 MG tablet, Take 1 tablet by mouth Every Night., Disp: 30 tablet, Rfl: 1  •  butalbital-aspirin-caffeine (FIORINAL) -40 MG per capsule, Take 1 capsule by mouth Every 4 (Four) Hours As Needed for Headache., Disp: 30 capsule, Rfl: 3  •  sertraline (ZOLOFT) 100 MG tablet, Take 1 tablet by mouth Daily., Disp: 30 tablet, Rfl:  4    Review of Systems   Constitutional: Negative for chills, fatigue and fever.   HENT: Negative for ear pain and sore throat.    Respiratory: Negative for cough and shortness of breath.    Cardiovascular: Negative for chest pain.   Gastrointestinal: Negative for abdominal pain, nausea and vomiting.   Genitourinary: Negative for dysuria.   Skin: Negative for rash.   Allergic/Immunologic: Negative for immunocompromised state.   Neurological: Negative for dizziness and headache.   Psychiatric/Behavioral: Positive for depressed mood (improved). Negative for self-injury and sleep disturbance. The patient is nervous/anxious.        Objective   Vitals:    08/29/19 0811   BP: 110/80   Pulse: 83   Resp: 16   Temp: 97.6 °F (36.4 °C)   SpO2: 99%     Physical Exam   Constitutional: She appears well-developed and well-nourished.   HENT:   Head: Normocephalic and atraumatic.   Right Ear: Tympanic membrane, external ear and ear canal normal.   Left Ear: Tympanic membrane, external ear and ear canal normal.   Mouth/Throat: Oropharynx is clear and moist and mucous membranes are normal.   Eyes: Conjunctivae are normal.   Cardiovascular: Normal rate and regular rhythm.   No murmur heard.  Pulmonary/Chest: Effort normal and breath sounds normal. She has no wheezes. She has no rales.   Lymphadenopathy:     She has no cervical adenopathy.   Psychiatric: Her behavior is normal. Her mood appears anxious.         Assessment/Plan   Elicia was seen today for migraine.    Diagnoses and all orders for this visit:    Migraine without aura and without status migrainosus, not intractable  - Continue Elavil nightly.     Need for Tdap vaccination  -     Tdap Vaccine Greater Than or Equal To 6yo IM    Anxiety  - Continue Zoloft as directed.              Return in about 3 months (around 11/29/2019) for Annual physical- w/o pap.

## 2019-10-09 RX ORDER — BUTALBITAL, ASPIRIN, AND CAFFEINE 325; 50; 40 MG/1; MG/1; MG/1
1 CAPSULE ORAL EVERY 6 HOURS PRN
Qty: 30 CAPSULE | Refills: 2 | Status: SHIPPED | OUTPATIENT
Start: 2019-10-09 | End: 2021-09-29

## 2019-10-13 DIAGNOSIS — G43.009 MIGRAINE WITHOUT AURA AND WITHOUT STATUS MIGRAINOSUS, NOT INTRACTABLE: ICD-10-CM

## 2019-10-14 RX ORDER — AMITRIPTYLINE HYDROCHLORIDE 10 MG/1
10 TABLET, FILM COATED ORAL NIGHTLY
Qty: 30 TABLET | Refills: 1 | Status: SHIPPED | OUTPATIENT
Start: 2019-10-14 | End: 2020-02-28 | Stop reason: SDUPTHER

## 2020-02-28 DIAGNOSIS — F41.8 DEPRESSION WITH ANXIETY: ICD-10-CM

## 2020-02-28 DIAGNOSIS — G43.009 MIGRAINE WITHOUT AURA AND WITHOUT STATUS MIGRAINOSUS, NOT INTRACTABLE: ICD-10-CM

## 2020-02-28 RX ORDER — AMITRIPTYLINE HYDROCHLORIDE 10 MG/1
10 TABLET, FILM COATED ORAL NIGHTLY
Qty: 30 TABLET | Refills: 1 | Status: SHIPPED | OUTPATIENT
Start: 2020-02-28 | End: 2020-09-08 | Stop reason: SDUPTHER

## 2020-02-28 RX ORDER — SERTRALINE HYDROCHLORIDE 100 MG/1
100 TABLET, FILM COATED ORAL DAILY
Qty: 30 TABLET | Refills: 4 | Status: SHIPPED | OUTPATIENT
Start: 2020-02-28 | End: 2021-01-22

## 2020-05-19 ENCOUNTER — TELEPHONE (OUTPATIENT)
Dept: INTERNAL MEDICINE | Facility: CLINIC | Age: 25
End: 2020-05-19
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
## 2020-05-21 ENCOUNTER — OFFICE VISIT (OUTPATIENT)
Dept: INTERNAL MEDICINE | Facility: CLINIC | Age: 25
End: 2020-05-21

## 2020-05-21 VITALS
TEMPERATURE: 99.1 F | OXYGEN SATURATION: 99 % | DIASTOLIC BLOOD PRESSURE: 76 MMHG | SYSTOLIC BLOOD PRESSURE: 120 MMHG | HEART RATE: 97 BPM | BODY MASS INDEX: 24.81 KG/M2 | WEIGHT: 140 LBS

## 2020-05-21 DIAGNOSIS — S13.9XXD ACUTE SPRAIN OF LIGAMENT OF NECK, SUBSEQUENT ENCOUNTER: ICD-10-CM
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
## 2020-05-21 DIAGNOSIS — M62.838 MUSCLE SPASMS OF NECK: ICD-10-CM

## 2020-05-21 DIAGNOSIS — S29.011D PECTORALIS MUSCLE STRAIN, SUBSEQUENT ENCOUNTER: ICD-10-CM
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
## 2020-05-21 PROBLEM — S13.9XXA ACUTE CERVICAL SPRAIN: Status: ACTIVE | Noted: 2020-05-13

## 2020-05-21 PROCEDURE — 99214 OFFICE O/P EST MOD 30 MIN: CPT | Performed by: PHYSICIAN ASSISTANT

## 2020-05-21 RX ORDER — NAPROXEN 500 MG/1
500 TABLET ORAL 2 TIMES DAILY WITH MEALS
Qty: 60 TABLET | Refills: 0 | Status: SHIPPED | OUTPATIENT
Start: 2020-05-21

## 2020-05-21 RX ORDER — DICLOFENAC SODIUM 75 MG/1
75 TABLET, DELAYED RELEASE ORAL 2 TIMES DAILY
COMMUNITY
Start: 2020-05-14 | End: 2020-05-21

## 2020-05-21 RX ORDER — CYCLOBENZAPRINE HCL 10 MG
10 TABLET ORAL 3 TIMES DAILY
COMMUNITY
Start: 2020-05-14 | End: 2020-05-21

## 2020-05-21 RX ORDER — METHOCARBAMOL 750 MG/1
750 TABLET, FILM COATED ORAL 4 TIMES DAILY PRN
Qty: 60 TABLET | Refills: 0 | Status: SHIPPED | OUTPATIENT
Start: 2020-05-21
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
## 2020-06-01 ENCOUNTER — TREATMENT (OUTPATIENT)
Dept: PHYSICAL THERAPY | Facility: CLINIC | Age: 25
End: 2020-06-01

## 2020-06-01 DIAGNOSIS — M25.511 RIGHT SHOULDER PAIN, UNSPECIFIED CHRONICITY: ICD-10-CM

## 2020-06-01 DIAGNOSIS — M54.2 PAIN, NECK: Primary | ICD-10-CM

## 2020-06-01 DIAGNOSIS — M25.60 DECREASED RANGE OF MOTION: ICD-10-CM

## 2020-06-01 PROCEDURE — 97161 PT EVAL LOW COMPLEX 20 MIN: CPT | Performed by: PHYSICAL THERAPIST

## 2020-06-01 PROCEDURE — 97110 THERAPEUTIC EXERCISES: CPT | Performed by: PHYSICAL THERAPIST
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
## 2020-06-01 NOTE — PATIENT INSTRUCTIONS
Issued initial HEP including upper trap and LS stretches, supine chin tucks and supine chin tucks w/ retraction. Encouraged use of heat prior to stretching to promote muscle relaxation/elongation.

## 2020-06-24 ENCOUNTER — TREATMENT (OUTPATIENT)
Dept: PHYSICAL THERAPY | Facility: CLINIC | Age: 25
End: 2020-06-24

## 2020-06-24 DIAGNOSIS — M54.2 PAIN, NECK: Primary | ICD-10-CM

## 2020-06-24 DIAGNOSIS — M25.60 DECREASED RANGE OF MOTION: ICD-10-CM

## 2020-06-24 DIAGNOSIS — M25.511 RIGHT SHOULDER PAIN, UNSPECIFIED CHRONICITY: ICD-10-CM

## 2020-06-24 PROCEDURE — 97110 THERAPEUTIC EXERCISES: CPT | Performed by: PHYSICAL THERAPIST

## 2020-06-24 NOTE — PROGRESS NOTES
Physical Therapy Daily Progress Note  Visit: 2      Patient: Elicia Arndt   : 1995  Referring practitioner: DOMO Tompkins  Visit Diagnosis:     ICD-10-CM ICD-9-CM   1. Pain, neck M54.2 723.1   2. Right shoulder pain, unspecified chronicity M25.511 719.41   3. Decreased range of motion M25.60 719.50     Date of Initial Visit: Type: THERAPY  Noted: 2020  Today's Date: 2020        Subjective     Elicia Arndt reports: Pt states that her neck and shoulder pn has eased up quite a bit. Has been working on her HEP w/o any issues.    Treatment  Pre Treatment Pn Score: 0  Post Treatment Pn Score:  0    Exercise 1  Exercise Name 1: UT stretch  Sets/Reps 1: 3  Time: 15 sec  Exercise 2  Exercise Name 2: LS stretch  Sets/Reps 2: 3  Time 2: 15 sec  Exercise 3  Exercise Name 3: supine chin tuck  Sets/Reps 3: 15  Time 3: 3 sec  Exercise 4  Exercise Name 4: supine chin tuck w/ retraction  Sets/Reps 4: 15  Time 4: 3 sec  Exercise 5  Exercise Name 5: prone scap retractions  Sets/Reps 5: 15  Exercise 6  Exercise Name 6: low trap lifts at wall  Sets/Reps 6: 15  Exercise 7  Exercise Name 7: scap slides at wall  Sets/Reps 7: 15  Exercise 8  Exercise Name 8: supine chin tuck w/ rotation  Sets/Reps 8: 15  Exercise 9  Exercise Name 9: wall slides  Sets/Reps 9: 15             Objective          Active Range of Motion   Cervical/Thoracic Spine   Cervical    Flexion: 55 degrees   Extension: 50 degrees   Left lateral flexion: 42 degrees   Right lateral flexion: 55 degrees   Left rotation: 60 degrees   Right rotation: 60 degrees     Right Shoulder   Flexion: 135 degrees     Additional Active Range of Motion Details  Reports R UT stretch w/ L SB and w/ L rotation          Assessment & Plan     Assessment  Assessment details: Pt compliant w/ initial HEP, reports improvement in neck and shoulder pn. She demonstrates nearly restored cervical ROM in all planes. L SB improved but remains limited compared to contralateral  side due to R UT tightness. Pt does well w/ DNF activities. Updated HEP to include scap slides at wall, prone scap retraction w/ shoulder extn, and standing low trap lifts from wall. Pt reported no increase in pn by end of visit today.    Plan  Plan details: Assess response to current HEP and continue w/ DNF progression-add head lifts               Timed:  Manual Therapy:    0     mins  67484;  Therapeutic Exercise:    32     mins  94489;     Neuromuscular Shannan:    0    mins  09289;    Therapeutic Activity:     0     mins  22224;     Gait Trainin     mins  70071;     Ultrasound:     0     mins  32323;    Electrical Stimulation:    0     mins  68114 ( );    Untimed:  Electrical Stimulation:    0     mins  56258 ( );  Mechanical Traction:    0     mins  21422;     Timed Treatment:   32   mins   Total Treatment:     34   mins      Qi Bernard, PT  Physical Therapist

## 2020-07-01 ENCOUNTER — TREATMENT (OUTPATIENT)
Dept: PHYSICAL THERAPY | Facility: CLINIC | Age: 25
End: 2020-07-01

## 2020-07-01 DIAGNOSIS — M54.2 PAIN, NECK: Primary | ICD-10-CM

## 2020-07-01 DIAGNOSIS — M25.511 RIGHT SHOULDER PAIN, UNSPECIFIED CHRONICITY: ICD-10-CM

## 2020-07-01 DIAGNOSIS — M25.60 DECREASED RANGE OF MOTION: ICD-10-CM

## 2020-07-01 PROCEDURE — 97110 THERAPEUTIC EXERCISES: CPT | Performed by: PHYSICAL THERAPIST
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
## 2020-07-15 ENCOUNTER — TREATMENT (OUTPATIENT)
Dept: PHYSICAL THERAPY | Facility: CLINIC | Age: 25
End: 2020-07-15

## 2020-07-15 DIAGNOSIS — M54.2 PAIN, NECK: Primary | ICD-10-CM

## 2020-07-15 PROCEDURE — 97110 THERAPEUTIC EXERCISES: CPT | Performed by: PHYSICAL THERAPIST

## 2020-07-15 NOTE — PROGRESS NOTES
Physical Therapy Daily Progress Note  Visit: 4      Patient: Elicia Arndt   : 1995  Referring practitioner: DOMO Tompkins  Visit Diagnosis:     ICD-10-CM ICD-9-CM   1. Pain, neck M54.2 723.1     Date of Initial Visit: Type: THERAPY  Noted: 2020  Today's Date: 7/15/2020        Subjective     Elicia Arndt reports: Pt states that her neck pn is not any improved. Most of her pn occurs at the end of the day or at night. She has tried working on HEP every other day. Describes as soreness, tension between her neck and R shoulder.    Treatment  Pre Treatment Pn Score: 4  Post Treatment Pn Score:  3    Exercise 1  Exercise Name 1: Prone YTI  Sets/Reps 1: 20 ea  Exercise 2  Exercise Name 2: standing Y lifts  Sets/Reps 2: 20  Exercise 3  Exercise Name 3: rows  Equipment/Resistance 3: GTB  Sets/Reps 3: 20  Exercise 4  Exercise Name 4: lat pulls  Equipment/Resistance 4: GTB  Sets/Reps 4: 20  Exercise 5  Exercise Name 5: prone scap retraction w/ shoulder extn  Sets/Reps 5: 30  Exercise 6  Exercise Name 6: prone scap slides  Sets/Reps 6: 15  Exercise 7  Exercise Name 7: supine cervical retraction w/ rotation  Sets/Reps 7: 15             Objective       Assessment & Plan     Assessment  Assessment details: Pt reports compliance w/ her HEP but denies any improvement in her R neck pn. Cervical mobility remains intact w/ complaint only of R UT tightness during L SB. Continued w/ focus on postural strengthening, DNF activation. Progressed today to prone YTIs and scap slides, TB rows/lat pulls, and cervical retraction w/ rotation. Issued updated HEP. Pt challenged by exercise progressions, denied increased pn by end of visit.    Plan  Plan details: Cont per POC               Timed:  Manual Therapy:    0     mins  66511;  Therapeutic Exercise:    40     mins  01395;     Neuromuscular Shannan:    0    mins  80522;    Therapeutic Activity:     0     mins  79439;     Gait Trainin     mins  52923;      Ultrasound:     0     mins  77957;    Electrical Stimulation:    0     mins  69425 ( );    Untimed:  Electrical Stimulation:    0     mins  15061 ( );  Mechanical Traction:    0     mins  23637;     Timed Treatment:   40   mins   Total Treatment:     42   mins      Qi Bernard, PT  Physical Therapist

## 2020-08-17 ENCOUNTER — TREATMENT (OUTPATIENT)
Dept: PHYSICAL THERAPY | Facility: CLINIC | Age: 25
End: 2020-08-17

## 2020-08-17 DIAGNOSIS — M25.511 RIGHT SHOULDER PAIN, UNSPECIFIED CHRONICITY: ICD-10-CM

## 2020-08-17 DIAGNOSIS — M54.2 PAIN, NECK: Primary | ICD-10-CM

## 2020-08-17 DIAGNOSIS — M25.60 DECREASED RANGE OF MOTION: ICD-10-CM

## 2020-08-17 PROCEDURE — 97110 THERAPEUTIC EXERCISES: CPT | Performed by: PHYSICAL THERAPIST
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
## 2020-09-08 DIAGNOSIS — G43.009 MIGRAINE WITHOUT AURA AND WITHOUT STATUS MIGRAINOSUS, NOT INTRACTABLE: ICD-10-CM

## 2020-09-09 RX ORDER — AMITRIPTYLINE HYDROCHLORIDE 10 MG/1
10 TABLET, FILM COATED ORAL NIGHTLY
Qty: 30 TABLET | Refills: 1 | Status: SHIPPED | OUTPATIENT
Start: 2020-09-09 | End: 2021-01-22 | Stop reason: SDUPTHER

## 2020-10-28 ENCOUNTER — DOCUMENTATION (OUTPATIENT)
Dept: PHYSICAL THERAPY | Facility: CLINIC | Age: 25
End: 2020-10-28

## 2020-10-28 DIAGNOSIS — M25.511 RIGHT SHOULDER PAIN, UNSPECIFIED CHRONICITY: ICD-10-CM

## 2020-10-28 DIAGNOSIS — M54.2 PAIN, NECK: Primary | ICD-10-CM

## 2020-10-28 DIAGNOSIS — M25.60 DECREASED RANGE OF MOTION: ICD-10-CM

## 2020-10-28 NOTE — PROGRESS NOTES
Discharge Summary  Discharge Summary from Physical Therapy Report    Patient: Elicia Arndt   : 1995  Diagnosis/ICD-10 Code:  The primary encounter diagnosis was Pain, neck. Diagnoses of Right shoulder pain, unspecified chronicity and Decreased range of motion were also pertinent to this visit.   Referring practitioner: DOMO Tompkins  Date of Initial Visit: Type: THERAPY  Noted: 2020  Today's Date: 10/28/2020      Number of Visits:      Date of Discharge: 2020    Goals: Partially Met    Assessment/Reason for Discharge: Pt reported significant improvement in neck/shoulder pn and function. She met nearly all goals set for PT and was transitioned to independent HEP/self mgmt.    Discharge Plan: Continue with current home exercise program as instructed            Qi Bernard, PT  Physical Therapist

## 2021-01-21 ENCOUNTER — TELEPHONE (OUTPATIENT)
Dept: INTERNAL MEDICINE | Facility: CLINIC | Age: 26
End: 2021-01-21

## 2021-01-21 NOTE — TELEPHONE ENCOUNTER
Regarding: Visit Follow-Up Question  Contact: [phone]  ----- Message from Rocío Ryan MA sent at 1/21/2021 10:46 AM EST -----       ----- Message from Elicia Arndt to Zoey Vitale PA-C sent at 1/21/2021  9:58 AM -----   Can I make an appointment for my depression and anxiety with you I don't work Wednesday through Friday

## 2021-01-22 ENCOUNTER — OFFICE VISIT (OUTPATIENT)
Dept: INTERNAL MEDICINE | Facility: CLINIC | Age: 26
End: 2021-01-22

## 2021-01-22 VITALS
BODY MASS INDEX: 25.34 KG/M2 | RESPIRATION RATE: 19 BRPM | WEIGHT: 143 LBS | OXYGEN SATURATION: 99 % | HEIGHT: 63 IN | DIASTOLIC BLOOD PRESSURE: 82 MMHG | TEMPERATURE: 97 F | SYSTOLIC BLOOD PRESSURE: 130 MMHG | HEART RATE: 98 BPM

## 2021-01-22 DIAGNOSIS — F32.9 MAJOR DEPRESSIVE DISORDER, REMISSION STATUS UNSPECIFIED, UNSPECIFIED WHETHER RECURRENT: ICD-10-CM

## 2021-01-22 DIAGNOSIS — F41.0 GENERALIZED ANXIETY DISORDER WITH PANIC ATTACKS: Primary | ICD-10-CM

## 2021-01-22 DIAGNOSIS — F41.1 GENERALIZED ANXIETY DISORDER WITH PANIC ATTACKS: Primary | ICD-10-CM

## 2021-01-22 DIAGNOSIS — G43.009 MIGRAINE WITHOUT AURA AND WITHOUT STATUS MIGRAINOSUS, NOT INTRACTABLE: ICD-10-CM

## 2021-01-22 PROCEDURE — 99213 OFFICE O/P EST LOW 20 MIN: CPT | Performed by: PHYSICIAN ASSISTANT

## 2021-01-22 RX ORDER — VENLAFAXINE HYDROCHLORIDE 37.5 MG/1
37.5 CAPSULE, EXTENDED RELEASE ORAL DAILY
Qty: 30 CAPSULE | Refills: 2 | Status: SHIPPED | OUTPATIENT
Start: 2021-01-22 | End: 2021-03-05

## 2021-01-22 RX ORDER — AMITRIPTYLINE HYDROCHLORIDE 10 MG/1
10 TABLET, FILM COATED ORAL NIGHTLY
Qty: 30 TABLET | Refills: 1 | Status: SHIPPED | OUTPATIENT
Start: 2021-01-22 | End: 2022-03-31

## 2021-03-05 ENCOUNTER — OFFICE VISIT (OUTPATIENT)
Dept: INTERNAL MEDICINE | Facility: CLINIC | Age: 26
End: 2021-03-05

## 2021-03-05 VITALS
WEIGHT: 143 LBS | TEMPERATURE: 97 F | OXYGEN SATURATION: 97 % | DIASTOLIC BLOOD PRESSURE: 80 MMHG | RESPIRATION RATE: 20 BRPM | SYSTOLIC BLOOD PRESSURE: 110 MMHG | BODY MASS INDEX: 25.34 KG/M2 | HEIGHT: 63 IN | HEART RATE: 103 BPM

## 2021-03-05 DIAGNOSIS — F41.1 GENERALIZED ANXIETY DISORDER WITH PANIC ATTACKS: ICD-10-CM

## 2021-03-05 DIAGNOSIS — F41.0 GENERALIZED ANXIETY DISORDER WITH PANIC ATTACKS: ICD-10-CM

## 2021-03-05 DIAGNOSIS — F32.9 MAJOR DEPRESSIVE DISORDER, REMISSION STATUS UNSPECIFIED, UNSPECIFIED WHETHER RECURRENT: Primary | ICD-10-CM

## 2021-03-05 PROCEDURE — 99213 OFFICE O/P EST LOW 20 MIN: CPT | Performed by: PHYSICIAN ASSISTANT

## 2021-03-05 RX ORDER — VENLAFAXINE HYDROCHLORIDE 75 MG/1
75 CAPSULE, EXTENDED RELEASE ORAL DAILY
Qty: 30 CAPSULE | Refills: 2 | Status: SHIPPED | OUTPATIENT
Start: 2021-03-05 | End: 2021-07-19 | Stop reason: SDUPTHER

## 2021-03-05 NOTE — PROGRESS NOTES
"Chief Complaint   Patient presents with   • Anxiety     6 week F/U       Subjective       History of Present Illness     Elicia Arndt is a 25 y.o. female. She presents for follow up of anxiety and depression. Pt is taking Effexor as directed. She has seen moderate improvement in her anxiety and depression. She states she \"has a better attitude\" about things, less easily agitated with her kids, and less frequent panic attacks. Still has some day to day anxiety but improved. Increased stress with daughter recently dx with dyslexia which worries her. She is not having the crying episodes she had previously and less low moods. Still having some trouble falling asleep and staying asleep, same as last visit about 5 hours per night but also only has 5-6 hours dedicated for sleep due to her current schedule. Overall feels improved but feels she could be doing better. Interested in increased dose of medication. No s/e to Effexor.     The following portions of the patient's history were reviewed and updated as appropriate: allergies, current medications, past medical history, past social history and problem list.    Allergies   Allergen Reactions   • Adhesive Tape Hives     Red and hives       Social History     Tobacco Use   • Smoking status: Current Every Day Smoker     Packs/day: 1.00     Years: 5.00     Pack years: 5.00   • Smokeless tobacco: Never Used   Substance Use Topics   • Alcohol use: No     Frequency: Never         Current Outpatient Medications:   •  amitriptyline (ELAVIL) 10 MG tablet, Take 1 tablet by mouth Every Night., Disp: 30 tablet, Rfl: 1  •  butalbital-aspirin-caffeine (FIORINAL) -40 MG capsule, Take 1 capsule by mouth Every 6 (Six) Hours As Needed for Headache., Disp: 30 capsule, Rfl: 2  •  methocarbamol (ROBAXIN) 750 MG tablet, Take 1 tablet by mouth 4 (Four) Times a Day As Needed for Muscle Spasms., Disp: 60 tablet, Rfl: 0  •  naproxen (Naprosyn) 500 MG tablet, Take 1 tablet by mouth 2 (Two) " Times a Day With Meals., Disp: 60 tablet, Rfl: 0  •  venlafaxine XR (Effexor XR) 75 MG 24 hr capsule, Take 1 capsule by mouth Daily., Disp: 30 capsule, Rfl: 2    Review of Systems   Constitutional: Negative for chills, fatigue and fever.   HENT: Negative for congestion, ear pain, sore throat and trouble swallowing.    Eyes: Negative for pain.   Respiratory: Negative for cough, shortness of breath and wheezing.    Cardiovascular: Negative for chest pain and palpitations.   Gastrointestinal: Negative for abdominal pain, diarrhea, nausea and vomiting.   Genitourinary: Negative for dysuria and hematuria.   Musculoskeletal: Negative for back pain.   Skin: Negative for rash.   Allergic/Immunologic: Negative for immunocompromised state.   Neurological: Negative for dizziness, weakness and headache.   Psychiatric/Behavioral: Positive for sleep disturbance, depressed mood and stress. The patient is nervous/anxious.        Objective   Vitals:    03/05/21 1520   BP: 110/80   Pulse: 103   Resp: 20   Temp: 97 °F (36.1 °C)   SpO2: 97%     Physical Exam  Constitutional:       Appearance: Normal appearance. She is well-developed.   HENT:      Head: Normocephalic and atraumatic.      Right Ear: Tympanic membrane, ear canal and external ear normal.      Left Ear: Tympanic membrane, ear canal and external ear normal.      Nose: Nose normal.      Mouth/Throat:      Mouth: Mucous membranes are moist.      Pharynx: Oropharynx is clear.   Eyes:      Conjunctiva/sclera: Conjunctivae normal.   Neck:      Thyroid: No thyromegaly.   Cardiovascular:      Rate and Rhythm: Normal rate and regular rhythm.      Heart sounds: No murmur.   Pulmonary:      Effort: Pulmonary effort is normal.      Breath sounds: Normal breath sounds. No wheezing or rales.   Musculoskeletal:      Cervical back: Neck supple.   Lymphadenopathy:      Cervical: No cervical adenopathy.   Neurological:      Mental Status: She is alert.   Psychiatric:         Mood and Affect:  Mood normal.         Behavior: Behavior normal.           Assessment/Plan   Diagnoses and all orders for this visit:    1. Major depressive disorder, remission status unspecified, unspecified whether recurrent (Primary)  -     venlafaxine XR (Effexor XR) 75 MG 24 hr capsule; Take 1 capsule by mouth Daily.  Dispense: 30 capsule; Refill: 2    2. Generalized anxiety disorder with panic attacks  -     venlafaxine XR (Effexor XR) 75 MG 24 hr capsule; Take 1 capsule by mouth Daily.  Dispense: 30 capsule; Refill: 2      Increase Effexor to 75mg daily given partial improvement and no s/e to medication.   May consider low dose melatonin for improved sleep.            Return in about 3 months (around 6/5/2021) for Annual physical.

## 2021-03-08 ENCOUNTER — TELEPHONE (OUTPATIENT)
Dept: INTERNAL MEDICINE | Facility: CLINIC | Age: 26
End: 2021-03-08

## 2021-03-08 NOTE — TELEPHONE ENCOUNTER
[facility] [phone]  Requested pt's most recent pap smear results faxed to our office @ [phone]. Good verbal understanding.

## 2021-03-08 NOTE — TELEPHONE ENCOUNTER
----- Message from Zoey Vitale PA-C sent at 3/7/2021  1:14 PM EST -----  Please request most recent pap smear Jan 2021 from [facility].

## 2021-03-29 DIAGNOSIS — F41.0 GENERALIZED ANXIETY DISORDER WITH PANIC ATTACKS: Primary | ICD-10-CM

## 2021-03-29 DIAGNOSIS — F41.1 GENERALIZED ANXIETY DISORDER WITH PANIC ATTACKS: Primary | ICD-10-CM

## 2021-03-29 RX ORDER — HYDROXYZINE HYDROCHLORIDE 10 MG/1
10 TABLET, FILM COATED ORAL 3 TIMES DAILY PRN
Qty: 90 TABLET | Refills: 1 | Status: SHIPPED | OUTPATIENT
Start: 2021-03-29 | End: 2021-05-09

## 2021-05-09 RX ORDER — BUSPIRONE HYDROCHLORIDE 10 MG/1
10 TABLET ORAL 2 TIMES DAILY
Qty: 60 TABLET | Refills: 1 | Status: SHIPPED | OUTPATIENT
Start: 2021-05-09 | End: 2021-09-13

## 2021-07-19 DIAGNOSIS — F41.1 GENERALIZED ANXIETY DISORDER WITH PANIC ATTACKS: ICD-10-CM

## 2021-07-19 DIAGNOSIS — F41.0 GENERALIZED ANXIETY DISORDER WITH PANIC ATTACKS: ICD-10-CM

## 2021-07-19 DIAGNOSIS — F32.9 MAJOR DEPRESSIVE DISORDER, REMISSION STATUS UNSPECIFIED, UNSPECIFIED WHETHER RECURRENT: ICD-10-CM

## 2021-07-21 RX ORDER — VENLAFAXINE HYDROCHLORIDE 75 MG/1
75 CAPSULE, EXTENDED RELEASE ORAL DAILY
Qty: 30 CAPSULE | Refills: 2 | Status: SHIPPED | OUTPATIENT
Start: 2021-07-21 | End: 2021-11-22

## 2021-08-11 ENCOUNTER — LAB (OUTPATIENT)
Dept: LAB | Facility: HOSPITAL | Age: 26
End: 2021-08-11

## 2021-08-11 ENCOUNTER — OFFICE VISIT (OUTPATIENT)
Dept: INTERNAL MEDICINE | Facility: CLINIC | Age: 26
End: 2021-08-11

## 2021-08-11 VITALS
HEIGHT: 63 IN | DIASTOLIC BLOOD PRESSURE: 82 MMHG | SYSTOLIC BLOOD PRESSURE: 120 MMHG | TEMPERATURE: 98 F | RESPIRATION RATE: 18 BRPM | HEART RATE: 120 BPM | WEIGHT: 152 LBS | BODY MASS INDEX: 26.93 KG/M2 | OXYGEN SATURATION: 99 %

## 2021-08-11 DIAGNOSIS — R03.0 ELEVATED BLOOD PRESSURE READING WITHOUT DIAGNOSIS OF HYPERTENSION: Primary | ICD-10-CM

## 2021-08-11 DIAGNOSIS — F41.0 GENERALIZED ANXIETY DISORDER WITH PANIC ATTACKS: ICD-10-CM

## 2021-08-11 DIAGNOSIS — M79.89 SWELLING OF BOTH HANDS: ICD-10-CM

## 2021-08-11 DIAGNOSIS — F41.1 GENERALIZED ANXIETY DISORDER WITH PANIC ATTACKS: ICD-10-CM

## 2021-08-11 LAB
BASOPHILS # BLD AUTO: 0.05 10*3/MM3 (ref 0–0.2)
BASOPHILS NFR BLD AUTO: 0.7 % (ref 0–1.5)
DEPRECATED RDW RBC AUTO: 38.9 FL (ref 37–54)
EOSINOPHIL # BLD AUTO: 0.11 10*3/MM3 (ref 0–0.4)
EOSINOPHIL NFR BLD AUTO: 1.6 % (ref 0.3–6.2)
ERYTHROCYTE [DISTWIDTH] IN BLOOD BY AUTOMATED COUNT: 11.7 % (ref 12.3–15.4)
HCT VFR BLD AUTO: 45.2 % (ref 34–46.6)
HGB BLD-MCNC: 15.4 G/DL (ref 12–15.9)
IMM GRANULOCYTES # BLD AUTO: 0.02 10*3/MM3 (ref 0–0.05)
IMM GRANULOCYTES NFR BLD AUTO: 0.3 % (ref 0–0.5)
LYMPHOCYTES # BLD AUTO: 2.11 10*3/MM3 (ref 0.7–3.1)
LYMPHOCYTES NFR BLD AUTO: 30.1 % (ref 19.6–45.3)
MCH RBC QN AUTO: 31.2 PG (ref 26.6–33)
MCHC RBC AUTO-ENTMCNC: 34.1 G/DL (ref 31.5–35.7)
MCV RBC AUTO: 91.7 FL (ref 79–97)
MONOCYTES # BLD AUTO: 0.59 10*3/MM3 (ref 0.1–0.9)
MONOCYTES NFR BLD AUTO: 8.4 % (ref 5–12)
NEUTROPHILS NFR BLD AUTO: 4.13 10*3/MM3 (ref 1.7–7)
NEUTROPHILS NFR BLD AUTO: 58.9 % (ref 42.7–76)
NRBC BLD AUTO-RTO: 0 /100 WBC (ref 0–0.2)
PLATELET # BLD AUTO: 241 10*3/MM3 (ref 140–450)
PMV BLD AUTO: 10.7 FL (ref 6–12)
RBC # BLD AUTO: 4.93 10*6/MM3 (ref 3.77–5.28)
WBC # BLD AUTO: 7.01 10*3/MM3 (ref 3.4–10.8)

## 2021-08-11 PROCEDURE — 85025 COMPLETE CBC W/AUTO DIFF WBC: CPT | Performed by: PHYSICIAN ASSISTANT

## 2021-08-11 PROCEDURE — 80048 BASIC METABOLIC PNL TOTAL CA: CPT | Performed by: PHYSICIAN ASSISTANT

## 2021-08-11 PROCEDURE — 99214 OFFICE O/P EST MOD 30 MIN: CPT | Performed by: PHYSICIAN ASSISTANT

## 2021-08-11 RX ORDER — PROPRANOLOL HYDROCHLORIDE 20 MG/1
20 TABLET ORAL 2 TIMES DAILY PRN
Qty: 60 TABLET | Refills: 2 | Status: SHIPPED | OUTPATIENT
Start: 2021-08-11 | End: 2022-10-11

## 2021-08-12 LAB
ANION GAP SERPL CALCULATED.3IONS-SCNC: 10.2 MMOL/L (ref 5–15)
BUN SERPL-MCNC: 10 MG/DL (ref 6–20)
BUN/CREAT SERPL: 12.7 (ref 7–25)
CALCIUM SPEC-SCNC: 9.1 MG/DL (ref 8.6–10.5)
CHLORIDE SERPL-SCNC: 104 MMOL/L (ref 98–107)
CO2 SERPL-SCNC: 21.8 MMOL/L (ref 22–29)
CREAT SERPL-MCNC: 0.79 MG/DL (ref 0.57–1)
GFR SERPL CREATININE-BSD FRML MDRD: 88 ML/MIN/1.73
GLUCOSE SERPL-MCNC: 82 MG/DL (ref 65–99)
POTASSIUM SERPL-SCNC: 4.3 MMOL/L (ref 3.5–5.2)
SODIUM SERPL-SCNC: 136 MMOL/L (ref 136–145)

## 2021-09-13 RX ORDER — BUSPIRONE HYDROCHLORIDE 10 MG/1
TABLET ORAL
Qty: 60 TABLET | Refills: 1 | Status: SHIPPED | OUTPATIENT
Start: 2021-09-13 | End: 2022-03-31

## 2021-09-22 ENCOUNTER — TELEPHONE (OUTPATIENT)
Dept: INTERNAL MEDICINE | Facility: CLINIC | Age: 26
End: 2021-09-22

## 2021-09-22 NOTE — TELEPHONE ENCOUNTER
Regarding: Non-Urgent Medical Question  Contact: [phone]  ----- Message from Rocío Ryan MA sent at 9/22/2021  8:15 AM EDT -----       ----- Message from Elicia Arndt to Zoey Vitale PA-C sent at 9/21/2021 10:05 PM -----   I need a copy of my medical records please

## 2021-09-22 NOTE — TELEPHONE ENCOUNTER
Pt's requesting an offical copy of all of her medical records. Confirmed pt will need to stop in the office to request an official copy, sign the form, and we will request the preparation of the official documents. Pt confirmed she will be in tomorrow to request those records. Good verbal understanding.     YANNI

## 2021-09-22 NOTE — TELEPHONE ENCOUNTER
Rocío- please see if she needs all records, or just her current meds/ immunizations. Please see if she wants this faxed or  at office. OK to print records as needed.

## 2021-09-29 ENCOUNTER — OFFICE VISIT (OUTPATIENT)
Dept: INTERNAL MEDICINE | Facility: CLINIC | Age: 26
End: 2021-09-29

## 2021-09-29 ENCOUNTER — LAB (OUTPATIENT)
Dept: LAB | Facility: HOSPITAL | Age: 26
End: 2021-09-29

## 2021-09-29 VITALS
OXYGEN SATURATION: 98 % | HEIGHT: 63 IN | TEMPERATURE: 98 F | DIASTOLIC BLOOD PRESSURE: 80 MMHG | BODY MASS INDEX: 27.29 KG/M2 | HEART RATE: 105 BPM | WEIGHT: 154 LBS | SYSTOLIC BLOOD PRESSURE: 130 MMHG | RESPIRATION RATE: 18 BRPM

## 2021-09-29 DIAGNOSIS — R19.07 GENERALIZED ABDOMINAL SWELLING: ICD-10-CM

## 2021-09-29 DIAGNOSIS — F41.0 GENERALIZED ANXIETY DISORDER WITH PANIC ATTACKS: ICD-10-CM

## 2021-09-29 DIAGNOSIS — F41.1 GENERALIZED ANXIETY DISORDER WITH PANIC ATTACKS: ICD-10-CM

## 2021-09-29 DIAGNOSIS — R00.0 TACHYCARDIA: ICD-10-CM

## 2021-09-29 DIAGNOSIS — R82.998 LEUKOCYTES IN URINE: Primary | ICD-10-CM

## 2021-09-29 DIAGNOSIS — Z00.00 ENCOUNTER FOR HEALTH MAINTENANCE EXAMINATION: Primary | ICD-10-CM

## 2021-09-29 DIAGNOSIS — Z00.00 ENCOUNTER FOR HEALTH MAINTENANCE EXAMINATION: ICD-10-CM

## 2021-09-29 LAB
BILIRUB BLD-MCNC: NEGATIVE MG/DL
CANCER AG125 SERPL QL: 10.5 U/ML (ref 0–38.1)
CLARITY, POC: ABNORMAL
COLOR UR: YELLOW
EXPIRATION DATE: ABNORMAL
GLUCOSE UR STRIP-MCNC: NEGATIVE MG/DL
KETONES UR QL: ABNORMAL
LEUKOCYTE EST, POC: ABNORMAL
Lab: ABNORMAL
NITRITE UR-MCNC: NEGATIVE MG/ML
PH UR: 7 [PH] (ref 5–8)
PROT UR STRIP-MCNC: NEGATIVE MG/DL
RBC # UR STRIP: NEGATIVE /UL
SP GR UR: 1 (ref 1–1.03)
T4 FREE SERPL-MCNC: 1.12 NG/DL (ref 0.93–1.7)
TSH SERPL DL<=0.05 MIU/L-ACNC: 1.15 UIU/ML (ref 0.27–4.2)
UROBILINOGEN UR QL: NORMAL

## 2021-09-29 PROCEDURE — 84443 ASSAY THYROID STIM HORMONE: CPT

## 2021-09-29 PROCEDURE — 81003 URINALYSIS AUTO W/O SCOPE: CPT | Performed by: PHYSICIAN ASSISTANT

## 2021-09-29 PROCEDURE — 36415 COLL VENOUS BLD VENIPUNCTURE: CPT | Performed by: PHYSICIAN ASSISTANT

## 2021-09-29 PROCEDURE — 87086 URINE CULTURE/COLONY COUNT: CPT

## 2021-09-29 PROCEDURE — 86304 IMMUNOASSAY TUMOR CA 125: CPT

## 2021-09-29 PROCEDURE — 99395 PREV VISIT EST AGE 18-39: CPT | Performed by: PHYSICIAN ASSISTANT

## 2021-09-29 PROCEDURE — 84439 ASSAY OF FREE THYROXINE: CPT

## 2021-09-30 LAB — BACTERIA SPEC AEROBE CULT: NO GROWTH

## 2021-10-04 ENCOUNTER — TELEPHONE (OUTPATIENT)
Dept: INTERNAL MEDICINE | Facility: CLINIC | Age: 26
End: 2021-10-04

## 2021-10-04 NOTE — TELEPHONE ENCOUNTER
Dr. Horton called and stated that patient came to see him today because she wanted a second opinion of why she keeps swelling.  States that he thinks it's the Buspar and from having kids her metabolism has slowed down and nothing to be largley concerned about. Wants Lita to call him to discuss.

## 2021-10-05 NOTE — TELEPHONE ENCOUNTER
"Please call pt and schedule f/u with me at her convenience. The remainder of this call is for documentation purposes only and not to be relayed to pt.     ---  Spoke to Dr. Horton,  Med/ Peds. He saw pt this week in office, per request from patient's mother who works at [employer]. Dr. Horton also repeated some labs, and feels like her \"swelling\" may be weight gain rather than swelling, as he likewise could not appreciate any true edema. He advised possibly discussing change in Buspar dosing as her issues began after starting this med. Advised I will have her follow up with us to discuss this further. Appreciation for his phone call and input on this patient.     "

## 2021-10-06 PROCEDURE — 93000 ELECTROCARDIOGRAM COMPLETE: CPT | Performed by: PHYSICIAN ASSISTANT

## 2021-10-06 NOTE — TELEPHONE ENCOUNTER
Spoke to pt, advised of clinical message. Pt's in agreement with plan, she's been scheduled for a F/U on 10/12/2021 @ 9:45 am. Good verbal understanding.

## 2021-10-14 ENCOUNTER — OFFICE VISIT (OUTPATIENT)
Dept: INTERNAL MEDICINE | Facility: CLINIC | Age: 26
End: 2021-10-14

## 2021-10-14 VITALS
RESPIRATION RATE: 19 BRPM | HEART RATE: 100 BPM | DIASTOLIC BLOOD PRESSURE: 78 MMHG | OXYGEN SATURATION: 98 % | SYSTOLIC BLOOD PRESSURE: 120 MMHG | HEIGHT: 63 IN | WEIGHT: 156 LBS | BODY MASS INDEX: 27.64 KG/M2 | TEMPERATURE: 98 F

## 2021-10-14 DIAGNOSIS — F41.1 GENERALIZED ANXIETY DISORDER WITH PANIC ATTACKS: ICD-10-CM

## 2021-10-14 DIAGNOSIS — M79.89 SWELLING OF BOTH LOWER EXTREMITIES: Primary | ICD-10-CM

## 2021-10-14 DIAGNOSIS — F41.0 GENERALIZED ANXIETY DISORDER WITH PANIC ATTACKS: ICD-10-CM

## 2021-10-14 DIAGNOSIS — F32.9 MAJOR DEPRESSIVE DISORDER, REMISSION STATUS UNSPECIFIED, UNSPECIFIED WHETHER RECURRENT: ICD-10-CM

## 2021-10-14 PROCEDURE — 99213 OFFICE O/P EST LOW 20 MIN: CPT | Performed by: PHYSICIAN ASSISTANT

## 2021-10-14 RX ORDER — HYDROCHLOROTHIAZIDE 12.5 MG/1
12.5 TABLET ORAL DAILY
Qty: 30 TABLET | Refills: 2 | Status: SHIPPED | OUTPATIENT
Start: 2021-10-14 | End: 2022-02-15

## 2021-11-19 DIAGNOSIS — F41.0 GENERALIZED ANXIETY DISORDER WITH PANIC ATTACKS: ICD-10-CM

## 2021-11-19 DIAGNOSIS — F41.1 GENERALIZED ANXIETY DISORDER WITH PANIC ATTACKS: ICD-10-CM

## 2021-11-19 DIAGNOSIS — F32.9 MAJOR DEPRESSIVE DISORDER, REMISSION STATUS UNSPECIFIED, UNSPECIFIED WHETHER RECURRENT: ICD-10-CM

## 2021-11-22 RX ORDER — VENLAFAXINE HYDROCHLORIDE 75 MG/1
CAPSULE, EXTENDED RELEASE ORAL
Qty: 30 CAPSULE | Refills: 5 | Status: SHIPPED | OUTPATIENT
Start: 2021-11-22 | End: 2022-03-31 | Stop reason: SDUPTHER

## 2021-11-22 NOTE — TELEPHONE ENCOUNTER
LOV for chronic condition 10/14/2021  NOV 12/1/2021        There is an interaction with amitriptyline when trying to fill rx.  Please advise

## 2021-12-01 ENCOUNTER — OFFICE VISIT (OUTPATIENT)
Dept: INTERNAL MEDICINE | Facility: CLINIC | Age: 26
End: 2021-12-01

## 2021-12-01 VITALS
SYSTOLIC BLOOD PRESSURE: 128 MMHG | WEIGHT: 159.4 LBS | DIASTOLIC BLOOD PRESSURE: 72 MMHG | HEART RATE: 100 BPM | HEIGHT: 63 IN | OXYGEN SATURATION: 97 % | TEMPERATURE: 97.2 F | RESPIRATION RATE: 12 BRPM | BODY MASS INDEX: 28.24 KG/M2

## 2021-12-01 DIAGNOSIS — J01.40 ACUTE PANSINUSITIS, RECURRENCE NOT SPECIFIED: ICD-10-CM

## 2021-12-01 DIAGNOSIS — J02.9 PHARYNGITIS, UNSPECIFIED ETIOLOGY: Primary | ICD-10-CM

## 2021-12-01 DIAGNOSIS — M79.89 SWELLING OF UPPER EXTREMITY: ICD-10-CM

## 2021-12-01 LAB
EXPIRATION DATE: NORMAL
INTERNAL CONTROL: NORMAL
Lab: NORMAL
S PYO AG THROAT QL: NEGATIVE
SARS-COV-2 RNA NOSE QL NAA+PROBE: NOT DETECTED

## 2021-12-01 PROCEDURE — U0004 COV-19 TEST NON-CDC HGH THRU: HCPCS | Performed by: PHYSICIAN ASSISTANT

## 2021-12-01 PROCEDURE — 99214 OFFICE O/P EST MOD 30 MIN: CPT | Performed by: PHYSICIAN ASSISTANT

## 2021-12-01 PROCEDURE — 87880 STREP A ASSAY W/OPTIC: CPT | Performed by: PHYSICIAN ASSISTANT

## 2021-12-01 RX ORDER — AMOXICILLIN AND CLAVULANATE POTASSIUM 875; 125 MG/1; MG/1
1 TABLET, FILM COATED ORAL 2 TIMES DAILY
Qty: 20 TABLET | Refills: 0 | Status: SHIPPED | OUTPATIENT
Start: 2021-12-01 | End: 2022-03-31

## 2021-12-01 NOTE — PROGRESS NOTES
Chief Complaint   Patient presents with   • Edema     2 month follow up       Subjective       History of Present Illness     Elicia Arndt is a 26 y.o. female. She presents for follow up of swelling, and new issue of sinus pressure/URI. Pt reports swelling still present but more intermittent. She still has swelling at hands, feet, and abdomen at times. She denies any chest pain, SOA, abdominal pain, N/V/D. She is taking HCTZ 12.5mg and has discontinued Buspar to see if this may help as well.     Pt reports 5 days of R ear pain, sinus pressure, congestion, HA, sore throat, cough with productive green mucous, post-tussive vomiting mostly mucous. She also reports RAMON and slight nausea. She denies fever, chills, wheezing, loss of taste or smell.   She has tried Mucinex-D, nasal spray with minimal relief. Daughters have similar sx.      The following portions of the patient's history were reviewed and updated as appropriate: allergies, current medications, past medical history, past social history and problem list.    Allergies   Allergen Reactions   • Adhesive Tape Hives     Red and hives       Social History     Tobacco Use   • Smoking status: Current Every Day Smoker     Packs/day: 1.00     Years: 5.00     Pack years: 5.00   • Smokeless tobacco: Never Used   Substance Use Topics   • Alcohol use: No         Current Outpatient Medications:   •  amitriptyline (ELAVIL) 10 MG tablet, Take 1 tablet by mouth Every Night., Disp: 30 tablet, Rfl: 1  •  busPIRone (BUSPAR) 10 MG tablet, TAKE ONE TABLET BY MOUTH TWICE A DAY, Disp: 60 tablet, Rfl: 1  •  hydroCHLOROthiazide (HYDRODIURIL) 12.5 MG tablet, Take 1 tablet by mouth Daily., Disp: 30 tablet, Rfl: 2  •  levonorgestrel (MIRENA) 20 MCG/24HR IUD, 1 each by Intrauterine route 1 (One) Time., Disp: , Rfl:   •  methocarbamol (ROBAXIN) 750 MG tablet, Take 1 tablet by mouth 4 (Four) Times a Day As Needed for Muscle Spasms., Disp: 60 tablet, Rfl: 0  •  naproxen (Naprosyn) 500 MG  tablet, Take 1 tablet by mouth 2 (Two) Times a Day With Meals., Disp: 60 tablet, Rfl: 0  •  propranolol (INDERAL) 20 MG tablet, Take 1 tablet by mouth 2 (Two) Times a Day As Needed (anxiety/ elevated BP)., Disp: 60 tablet, Rfl: 2  •  venlafaxine XR (EFFEXOR-XR) 75 MG 24 hr capsule, TAKE ONE CAPSULE BY MOUTH DAILY, Disp: 30 capsule, Rfl: 5  •  amoxicillin-clavulanate (Augmentin) 875-125 MG per tablet, Take 1 tablet by mouth 2 (Two) Times a Day., Disp: 20 tablet, Rfl: 0    Review of Systems   Constitutional: Negative for chills and fever.   HENT: Positive for congestion, ear pain, sinus pressure and sore throat. Negative for trouble swallowing.    Eyes: Negative for pain.   Respiratory: Positive for cough. Negative for shortness of breath and wheezing.         +RAMON   Cardiovascular: Negative for chest pain and palpitations.        +generalized swelling   Gastrointestinal: Positive for nausea and vomiting. Negative for abdominal pain and diarrhea.   Genitourinary: Negative for dysuria.   Skin: Negative for rash.   Allergic/Immunologic: Negative for immunocompromised state.   Neurological: Positive for headache. Negative for dizziness and weakness.   Psychiatric/Behavioral: The patient is not nervous/anxious.        Objective   Vitals:    12/01/21 1005   BP: 128/72   Pulse: 100   Resp: 12   Temp: 97.2 °F (36.2 °C)   SpO2: 97%     Body mass index is 28.24 kg/m².    Physical Exam  Constitutional:       Appearance: Normal appearance. She is well-developed.   HENT:      Head: Normocephalic and atraumatic.      Right Ear: Tympanic membrane, ear canal and external ear normal.      Left Ear: Tympanic membrane, ear canal and external ear normal.      Nose: Nose normal.      Mouth/Throat:      Mouth: Mucous membranes are moist.      Pharynx: Posterior oropharyngeal erythema present. No pharyngeal swelling or oropharyngeal exudate.   Eyes:      Conjunctiva/sclera: Conjunctivae normal.   Neck:      Thyroid: No thyromegaly.       Vascular: No carotid bruit.   Cardiovascular:      Rate and Rhythm: Normal rate and regular rhythm.      Heart sounds: No murmur heard.       Comments: +minimal swelling of hands. No swelling of LE or abdomen noted.   Pulmonary:      Effort: Pulmonary effort is normal.      Breath sounds: Normal breath sounds. No wheezing or rales.   Abdominal:      Palpations: Abdomen is soft. There is no mass.      Tenderness: There is no abdominal tenderness.   Musculoskeletal:      Cervical back: Normal range of motion and neck supple.   Lymphadenopathy:      Cervical: No cervical adenopathy.   Skin:     Findings: No rash.   Neurological:      Mental Status: She is alert.   Psychiatric:         Behavior: Behavior normal.           Assessment/Plan   Diagnoses and all orders for this visit:    1. Pharyngitis, unspecified etiology (Primary)  -     POC Rapid Strep A  -     COVID-19 PCR, [facility], NP SWAB IN [facility]SP[facility] MEDIA/ORAL SWISH 24-30 HR TAT - Swab, Nasopharynx; Future  -     amoxicillin-clavulanate (Augmentin) 875-125 MG per tablet; Take 1 tablet by mouth 2 (Two) Times a Day.  Dispense: 20 tablet; Refill: 0  -     COVID-19 PCR, [facility], NP SWAB IN [facility]SP[facility] MEDIA/ORAL SWISH 24-30 HR TAT - Swab, Nasopharynx    2. Acute pansinusitis, recurrence not specified  -     POC Rapid Strep A  -     COVID-19 PCR, [facility], NP SWAB IN [facility] MEDIA/ORAL SWISH 24-30 HR TAT - Swab, Nasopharynx; Future  -     amoxicillin-clavulanate (Augmentin) 875-125 MG per tablet; Take 1 tablet by mouth 2 (Two) Times a Day.  Dispense: 20 tablet; Refill: 0  -     COVID-19 PCR, [facility], NP SWAB IN [facility]IA/ORAL SWISH 24-30 HR TAT - Swab, Nasopharynx    3. Swelling of upper extremity  - Slight improvement since last visit after d/c Buspar and beginning HCTZ. Still no overt swelling noted. We discussed decreasing sodium in diet, as well as routine exercise.                Return for Next  scheduled follow up.

## 2021-12-02 ENCOUNTER — TELEPHONE (OUTPATIENT)
Dept: INTERNAL MEDICINE | Facility: CLINIC | Age: 26
End: 2021-12-02

## 2021-12-02 NOTE — TELEPHONE ENCOUNTER
Spoke to pt, advised of clinical message. Pt's in agreement with plan. Good verbal understanding.

## 2022-02-15 DIAGNOSIS — M79.89 SWELLING OF BOTH LOWER EXTREMITIES: ICD-10-CM

## 2022-02-15 RX ORDER — HYDROCHLOROTHIAZIDE 12.5 MG/1
TABLET ORAL
Qty: 30 TABLET | Refills: 2 | Status: SHIPPED | OUTPATIENT
Start: 2022-02-15 | End: 2022-03-31 | Stop reason: SDUPTHER

## 2022-03-31 ENCOUNTER — OFFICE VISIT (OUTPATIENT)
Dept: INTERNAL MEDICINE | Facility: CLINIC | Age: 27
End: 2022-03-31

## 2022-03-31 VITALS
RESPIRATION RATE: 19 BRPM | HEIGHT: 63 IN | TEMPERATURE: 98 F | DIASTOLIC BLOOD PRESSURE: 80 MMHG | HEART RATE: 115 BPM | SYSTOLIC BLOOD PRESSURE: 122 MMHG | WEIGHT: 162 LBS | OXYGEN SATURATION: 98 % | BODY MASS INDEX: 28.7 KG/M2

## 2022-03-31 DIAGNOSIS — F41.0 GENERALIZED ANXIETY DISORDER WITH PANIC ATTACKS: ICD-10-CM

## 2022-03-31 DIAGNOSIS — F41.1 GENERALIZED ANXIETY DISORDER WITH PANIC ATTACKS: ICD-10-CM

## 2022-03-31 DIAGNOSIS — M79.89 SWELLING OF BOTH LOWER EXTREMITIES: ICD-10-CM

## 2022-03-31 DIAGNOSIS — G43.009 MIGRAINE WITHOUT AURA AND WITHOUT STATUS MIGRAINOSUS, NOT INTRACTABLE: ICD-10-CM

## 2022-03-31 DIAGNOSIS — F32.9 MAJOR DEPRESSIVE DISORDER, REMISSION STATUS UNSPECIFIED, UNSPECIFIED WHETHER RECURRENT: ICD-10-CM

## 2022-03-31 PROCEDURE — 99213 OFFICE O/P EST LOW 20 MIN: CPT | Performed by: PHYSICIAN ASSISTANT

## 2022-03-31 RX ORDER — BUSPIRONE HYDROCHLORIDE 10 MG/1
10 TABLET ORAL 2 TIMES DAILY
Qty: 60 TABLET | Refills: 1 | Status: CANCELLED | OUTPATIENT
Start: 2022-03-31

## 2022-03-31 RX ORDER — AMITRIPTYLINE HYDROCHLORIDE 10 MG/1
10 TABLET, FILM COATED ORAL NIGHTLY
Qty: 30 TABLET | Refills: 1 | Status: CANCELLED | OUTPATIENT
Start: 2022-03-31

## 2022-03-31 RX ORDER — VENLAFAXINE HYDROCHLORIDE 75 MG/1
75 CAPSULE, EXTENDED RELEASE ORAL DAILY
Qty: 30 CAPSULE | Refills: 5 | Status: SHIPPED | OUTPATIENT
Start: 2022-03-31 | End: 2022-12-15 | Stop reason: SDUPTHER

## 2022-03-31 RX ORDER — HYDROCHLOROTHIAZIDE 12.5 MG/1
12.5 TABLET ORAL DAILY
Qty: 30 TABLET | Refills: 5 | Status: SHIPPED | OUTPATIENT
Start: 2022-03-31 | End: 2022-07-27

## 2022-05-27 ENCOUNTER — OFFICE VISIT (OUTPATIENT)
Dept: INTERNAL MEDICINE | Facility: CLINIC | Age: 27
End: 2022-05-27

## 2022-05-27 VITALS
HEART RATE: 105 BPM | BODY MASS INDEX: 30.33 KG/M2 | DIASTOLIC BLOOD PRESSURE: 64 MMHG | RESPIRATION RATE: 18 BRPM | TEMPERATURE: 99.3 F | SYSTOLIC BLOOD PRESSURE: 116 MMHG | WEIGHT: 171.2 LBS | OXYGEN SATURATION: 98 %

## 2022-05-27 DIAGNOSIS — F41.1 GENERALIZED ANXIETY DISORDER WITH PANIC ATTACKS: ICD-10-CM

## 2022-05-27 DIAGNOSIS — F33.9 RECURRENT MAJOR DEPRESSIVE DISORDER, REMISSION STATUS UNSPECIFIED: Primary | ICD-10-CM

## 2022-05-27 DIAGNOSIS — F41.0 GENERALIZED ANXIETY DISORDER WITH PANIC ATTACKS: ICD-10-CM

## 2022-05-27 DIAGNOSIS — G47.00 INSOMNIA, UNSPECIFIED TYPE: ICD-10-CM

## 2022-05-27 PROCEDURE — 99213 OFFICE O/P EST LOW 20 MIN: CPT | Performed by: PHYSICIAN ASSISTANT

## 2022-05-27 RX ORDER — HYDROXYZINE HYDROCHLORIDE 25 MG/1
TABLET, FILM COATED ORAL
Qty: 30 TABLET | Refills: 2 | Status: SHIPPED | OUTPATIENT
Start: 2022-05-27

## 2022-05-27 RX ORDER — BUPROPION HYDROCHLORIDE 150 MG/1
150 TABLET ORAL EVERY MORNING
Qty: 30 TABLET | Refills: 2 | Status: SHIPPED | OUTPATIENT
Start: 2022-05-27 | End: 2022-07-27

## 2022-07-27 ENCOUNTER — OFFICE VISIT (OUTPATIENT)
Dept: INTERNAL MEDICINE | Facility: CLINIC | Age: 27
End: 2022-07-27

## 2022-07-27 VITALS
WEIGHT: 177 LBS | SYSTOLIC BLOOD PRESSURE: 118 MMHG | HEART RATE: 108 BPM | RESPIRATION RATE: 20 BRPM | BODY MASS INDEX: 31.35 KG/M2 | TEMPERATURE: 98.4 F | DIASTOLIC BLOOD PRESSURE: 68 MMHG

## 2022-07-27 DIAGNOSIS — F33.9 RECURRENT MAJOR DEPRESSIVE DISORDER, REMISSION STATUS UNSPECIFIED: ICD-10-CM

## 2022-07-27 DIAGNOSIS — I10 HYPERTENSION, UNSPECIFIED TYPE: ICD-10-CM

## 2022-07-27 DIAGNOSIS — R10.2 PELVIC PAIN: Primary | ICD-10-CM

## 2022-07-27 DIAGNOSIS — R14.0 BLOATING: ICD-10-CM

## 2022-07-27 DIAGNOSIS — N83.202 CYST OF LEFT OVARY: ICD-10-CM

## 2022-07-27 PROCEDURE — 99214 OFFICE O/P EST MOD 30 MIN: CPT | Performed by: PHYSICIAN ASSISTANT

## 2022-07-27 RX ORDER — LOSARTAN POTASSIUM AND HYDROCHLOROTHIAZIDE 12.5; 5 MG/1; MG/1
1 TABLET ORAL DAILY
Qty: 30 TABLET | Refills: 2 | Status: SHIPPED | OUTPATIENT
Start: 2022-07-27 | End: 2022-11-11 | Stop reason: SDUPTHER

## 2022-07-27 RX ORDER — BUPROPION HYDROCHLORIDE 300 MG/1
300 TABLET ORAL DAILY
Qty: 30 TABLET | Refills: 2 | Status: SHIPPED | OUTPATIENT
Start: 2022-07-27 | End: 2022-11-11 | Stop reason: SDUPTHER

## 2022-07-27 NOTE — PROGRESS NOTES
Chief Complaint   Patient presents with   • Major depressive disorder       Subjective       History of Present Illness     Elicia Arndt is a 27 y.o. female. She presents for follow up of depression/ anxiety, weight gain, and abdominal pain.     Depression  The patient reports that her mood has improved since her last visit. She has been taking Wellbutrin 150 mg since her last visit and feels that it has helped a little bit. She states that she has noticed a positive change in her motivation and not wanting to sleep all day like she normally does. She denies any side effects from the medication. She notes she is still having some low moods, but more motivation. She states that she is still taking Effexor.  She reports that her anxiety is in good control. She likes her job.    Weight gain  The patient reports that she has gained weight, which has been ongoing since last summer. She states that she has a bike at home and takes long walks. She states that her diet is pretty healthy and she consumes salads for a meal often. She states that she has more swelling in her arms and legs than anything. She states that she is feeling bloated and has fullness in her belly. She states that she is still feeling tightness in her hands and arms. She denies any new symptoms accompanying the weight gain.     Hypertension  The patient reports that her blood pressure at work has been high. She states that it has been running around 150/84 mmHg and 144/93 mmHg. She states that when she went to her OB/GYN, he complained to her that she is hypertensive with a blood pressure of 152/90 mmHg. She states that her face gets red when her blood pressure elevates and then her chest starts hurting. She states that she does not notice any relationship between the anxiety and the chest pain, just the blood pressure and chest pain. She states that she had an EKG back in the fall and everything looked okay. She has also had a Holter monitor  performed.    Lower abdominal pain  She has noticed that she has been experiencing lower abdominal pain. She states that she has been having lower abdominal pain. She states that she has a cyst on her ovaries and wonders if that causes her abdominal pain and bloating in her abdomen. She states that the pain is mostly on the right side and bothers her usually every other day. She states that it has been 4 years since she has had any imaging.     Family history  The patient states that several members of her family have a history of ovarian cysts. She also has a strong family history of heart disease in family members.      The following portions of the patient's history were reviewed and updated as appropriate: allergies, current medications, past family history, past medical history, past social history, past surgical history and problem list.    Allergies   Allergen Reactions   • Adhesive Tape Hives     Red and hives       Social History     Tobacco Use   • Smoking status: Current Every Day Smoker     Packs/day: 1.00     Years: 5.00     Pack years: 5.00   • Smokeless tobacco: Never Used   Substance Use Topics   • Alcohol use: No         Current Outpatient Medications:   •  hydrOXYzine (ATARAX) 25 MG tablet, Take 1/2 to 1 tablet by mouth every night for sleep/ anxiety., Disp: 30 tablet, Rfl: 2  •  levonorgestrel (MIRENA) 20 MCG/24HR IUD, 1 each by Intrauterine route 1 (One) Time., Disp: , Rfl:   •  methocarbamol (ROBAXIN) 750 MG tablet, Take 1 tablet by mouth 4 (Four) Times a Day As Needed for Muscle Spasms., Disp: 60 tablet, Rfl: 0  •  naproxen (Naprosyn) 500 MG tablet, Take 1 tablet by mouth 2 (Two) Times a Day With Meals., Disp: 60 tablet, Rfl: 0  •  propranolol (INDERAL) 20 MG tablet, Take 1 tablet by mouth 2 (Two) Times a Day As Needed (anxiety/ elevated BP)., Disp: 60 tablet, Rfl: 2  •  venlafaxine XR (EFFEXOR-XR) 75 MG 24 hr capsule, Take 1 capsule by mouth Daily., Disp: 30 capsule, Rfl: 5  •  buPROPion XL  (Wellbutrin XL) 300 MG 24 hr tablet, Take 1 tablet by mouth Daily., Disp: 30 tablet, Rfl: 2  •  losartan-hydrochlorothiazide (Hyzaar) 50-12.5 MG per tablet, Take 1 tablet by mouth Daily., Disp: 30 tablet, Rfl: 2    Review of Systems   Constitutional: Positive for fatigue and unexpected weight gain. Negative for chills and fever.   HENT: Negative for congestion, ear pain, sore throat and trouble swallowing.    Eyes: Negative for pain.   Respiratory: Negative for cough, shortness of breath and wheezing.    Cardiovascular: Positive for leg swelling. Negative for palpitations.   Gastrointestinal: Positive for abdominal pain. Negative for diarrhea, nausea and vomiting.   Genitourinary: Negative for dysuria and hematuria.   Musculoskeletal: Negative for back pain.   Skin: Negative for rash.   Allergic/Immunologic: Negative for immunocompromised state.   Neurological: Negative for dizziness, syncope, weakness and headache.   Psychiatric/Behavioral: Positive for depressed mood (improving). The patient is nervous/anxious.        Objective   Vitals:    07/27/22 1326   BP: 118/68   Pulse: 108   Resp: 20   Temp: 98.4 °F (36.9 °C)     Body mass index is 31.35 kg/m².    Physical Exam  Constitutional:       Appearance: Normal appearance. She is well-developed.   HENT:      Head: Normocephalic and atraumatic.      Right Ear: Tympanic membrane, ear canal and external ear normal.      Left Ear: Tympanic membrane, ear canal and external ear normal.      Nose: Nose normal.      Mouth/Throat:      Mouth: Mucous membranes are moist.      Pharynx: Oropharynx is clear.   Eyes:      Conjunctiva/sclera: Conjunctivae normal.   Neck:      Thyroid: No thyromegaly.   Cardiovascular:      Rate and Rhythm: Regular rhythm. Tachycardia present.      Heart sounds: No murmur heard.     Comments: +mild swelling of both hands, as well as trace pretibial edema of NAS lower extremities.   Pulmonary:      Effort: Pulmonary effort is normal.      Breath  sounds: Normal breath sounds. No wheezing or rales.   Abdominal:      General: There is distension.      Palpations: Abdomen is soft. There is no mass.      Tenderness: There is abdominal tenderness in the right lower quadrant and suprapubic area.   Musculoskeletal:      Cervical back: Neck supple.   Lymphadenopathy:      Cervical: No cervical adenopathy.   Skin:     Findings: No rash.   Psychiatric:         Behavior: Behavior normal.               Assessment & Plan   Diagnoses and all orders for this visit:    1. Pelvic pain (Primary)  -     CT Abdomen Pelvis With & Without Contrast; Future    2. Cyst of left ovary  -     CT Abdomen Pelvis With & Without Contrast; Future    3. Bloating  -     CT Abdomen Pelvis With & Without Contrast; Future    4. Recurrent major depressive disorder, remission status unspecified (HCC)  -     buPROPion XL (Wellbutrin XL) 300 MG 24 hr tablet; Take 1 tablet by mouth Daily.  Dispense: 30 tablet; Refill: 2    5. Hypertension, unspecified type  -     losartan-hydrochlorothiazide (Hyzaar) 50-12.5 MG per tablet; Take 1 tablet by mouth Daily.  Dispense: 30 tablet; Refill: 2      Anxiety  - Increase Wellbutrin to 300 mg daily.  - Continue Effexor.     Hypertension  - Discontinue hydrochlorothiazide.  - Start losartan/hydrochlorothiazide. We discussed possible side effects of the medication.  - Monitor blood pressure 4 days per week at work or every other day.     Pelvic pain  - We will order a CT scan of the abdomen and pelvis.     Cyst of left ovary  - We will order a CT scan of the abdomen and pelvis.    Bloating  - We will order a CT scan of the abdomen and pelvis.               Return in about 2 months (around 9/27/2022) for Follow up.    Transcribed from ambient dictation for Zoey Vitale PA-C by Rhonda Peters.  07/27/22   15:00 EDT    Patient verbalized consent to the visit recording.  I have personally performed the services described in this document as transcribed by the above  individual, and it is both accurate and complete.  Zoey Vitale PA-C  7/28/2022  12:01 EDT

## 2022-08-17 ENCOUNTER — APPOINTMENT (OUTPATIENT)
Dept: CT IMAGING | Facility: HOSPITAL | Age: 27
End: 2022-08-17

## 2022-08-24 ENCOUNTER — HOSPITAL ENCOUNTER (OUTPATIENT)
Dept: CT IMAGING | Facility: HOSPITAL | Age: 27
Discharge: HOME | End: 2022-08-24
Admitting: PHYSICIAN ASSISTANT

## 2022-08-24 DIAGNOSIS — R10.2 PELVIC PAIN: ICD-10-CM

## 2022-08-24 DIAGNOSIS — R14.0 BLOATING: ICD-10-CM

## 2022-08-24 DIAGNOSIS — N83.202 CYST OF LEFT OVARY: ICD-10-CM

## 2022-08-24 PROCEDURE — 25010000002 IOPAMIDOL 61 % SOLUTION: Performed by: PHYSICIAN ASSISTANT

## 2022-08-24 PROCEDURE — 74178 CT ABD&PLV WO CNTR FLWD CNTR: CPT

## 2022-08-24 RX ADMIN — IOPAMIDOL 100 ML: 612 INJECTION, SOLUTION INTRAVENOUS at 09:00

## 2022-08-25 ENCOUNTER — OFFICE VISIT (OUTPATIENT)
Dept: INTERNAL MEDICINE | Facility: CLINIC | Age: 27
End: 2022-08-25

## 2022-08-25 VITALS
HEART RATE: 104 BPM | SYSTOLIC BLOOD PRESSURE: 140 MMHG | BODY MASS INDEX: 31.71 KG/M2 | TEMPERATURE: 98.4 F | WEIGHT: 179 LBS | DIASTOLIC BLOOD PRESSURE: 74 MMHG | RESPIRATION RATE: 16 BRPM

## 2022-08-25 DIAGNOSIS — R00.0 TACHYCARDIA: Primary | ICD-10-CM

## 2022-08-25 DIAGNOSIS — R07.9 CHEST PAIN, EXERTIONAL: ICD-10-CM

## 2022-08-25 DIAGNOSIS — F32.9 MAJOR DEPRESSIVE DISORDER, REMISSION STATUS UNSPECIFIED, UNSPECIFIED WHETHER RECURRENT: ICD-10-CM

## 2022-08-25 PROCEDURE — 99214 OFFICE O/P EST MOD 30 MIN: CPT | Performed by: NURSE PRACTITIONER

## 2022-08-25 PROCEDURE — 93000 ELECTROCARDIOGRAM COMPLETE: CPT | Performed by: NURSE PRACTITIONER

## 2022-08-25 NOTE — PROGRESS NOTES
Patient Name: Elicia Arndt  : 1995   MRN: [id number]     Chief Complaint:    Chief Complaint   Patient presents with   • ER follow up -  ER  for elevated HR and B/P        History of Present Illness: Elicia Arndt is a 27 y.o. female presents to clinic for follow-up.    Patient was evaluated at  ED on 22 for chest pain and tachycardia.  Troponin was normal.  Kidney function was normal.  Liver enzymes were slightly elevated.  D-dimer was negative.  Thyroid was normal.  Magnesium was normal.  Potassium was normal.    Hypertension   Patient is taking losartan/HCTZ.  Patient has been monitoring her blood pressure it has been in the range of 130s to 140s over 80s.  She had an elevation of 156/85 couple days ago.    She has complaints of palpitations daily.  They last a few minutes associated with symptoms of feeling like she is going to pass out, chest pain and dizziness.  The location of chest pain is substernal.  The symptoms occur with exertion.  She has been taking propranolol 20 mg twice a day without relief.      Major depression   She is taking Wellbutrin  mg and effexor-xr.  Her mood is improved on dose of Wellbutrin.  Subjective     Review of System: Review of Systems   Constitutional: Negative for fatigue and fever.   HENT: Negative for congestion and rhinorrhea.    Respiratory: Positive for chest tightness and shortness of breath. Negative for cough and wheezing.    Cardiovascular: Positive for chest pain and palpitations.   Gastrointestinal: Negative for abdominal pain, diarrhea, nausea and vomiting.   Genitourinary: Negative for dysuria.   Musculoskeletal: Negative for myalgias.   Skin: Negative for rash.   Neurological: Negative for headaches.   Hematological: Negative for adenopathy.   Psychiatric/Behavioral: Negative for dysphoric mood.        Medications:     Current Outpatient Medications:   •  buPROPion XL (Wellbutrin XL) 300 MG 24 hr tablet, Take 1 tablet by mouth  Daily., Disp: 30 tablet, Rfl: 2  •  hydrOXYzine (ATARAX) 25 MG tablet, Take 1/2 to 1 tablet by mouth every night for sleep/ anxiety., Disp: 30 tablet, Rfl: 2  •  levonorgestrel (MIRENA) 20 MCG/24HR IUD, 1 each by Intrauterine route 1 (One) Time., Disp: , Rfl:   •  losartan-hydrochlorothiazide (Hyzaar) 50-12.5 MG per tablet, Take 1 tablet by mouth Daily., Disp: 30 tablet, Rfl: 2  •  methocarbamol (ROBAXIN) 750 MG tablet, Take 1 tablet by mouth 4 (Four) Times a Day As Needed for Muscle Spasms., Disp: 60 tablet, Rfl: 0  •  naproxen (Naprosyn) 500 MG tablet, Take 1 tablet by mouth 2 (Two) Times a Day With Meals. (Patient taking differently: Take 500 mg by mouth 2 (Two) Times a Day As Needed.), Disp: 60 tablet, Rfl: 0  •  propranolol (INDERAL) 20 MG tablet, Take 1 tablet by mouth 2 (Two) Times a Day As Needed (anxiety/ elevated BP)., Disp: 60 tablet, Rfl: 2  •  venlafaxine XR (EFFEXOR-XR) 75 MG 24 hr capsule, Take 1 capsule by mouth Daily., Disp: 30 capsule, Rfl: 5    Allergies:   Allergies   Allergen Reactions   • Adhesive Tape Hives     Red and hives         Objective     Physical Exam:   Vital Signs:   Vitals:    08/25/22 0817   BP: 140/74   BP Location: Right arm   Patient Position: Sitting   Cuff Size: Adult   Pulse: 104   Resp: 16   Temp: 98.4 °F (36.9 °C)   TempSrc: Infrared   Weight: 81.2 kg (179 lb)         Physical Exam  Constitutional:       General: She is not in acute distress.     Appearance: She is not ill-appearing.   HENT:      Head: Normocephalic.   Cardiovascular:      Rate and Rhythm: Regular rhythm. Tachycardia present.      Heart sounds: Normal heart sounds. No murmur heard.  Pulmonary:      Breath sounds: Normal breath sounds.   Abdominal:      General: Bowel sounds are normal.      Tenderness: There is no abdominal tenderness.   Lymphadenopathy:      Cervical: No cervical adenopathy.   Skin:     General: Skin is warm and dry.   Neurological:      General: No focal deficit present.      Mental  Status: She is oriented to person, place, and time.   Psychiatric:         Mood and Affect: Mood normal.       ECG 12 Lead    Date/Time: 8/25/2022 9:41 AM  Performed by: Arina Venegas APRN  Authorized by: Arina Venegas APRN   Comparison: compared with previous ECG from 10/21/2021  Rhythm: sinus tachycardia  Rate: normal  Conduction: conduction normal  QRS axis: normal    Clinical impression: abnormal EKG              Assessment / Plan      Assessment/Plan:   Diagnoses and all orders for this visit:    1. Tachycardia (Primary)  -     Adult Transthoracic Echo Complete W/ Cont if Necessary Per Protocol; Future  -     Holter Monitor - 72 Hour Up To 15 Days; Future  -     ECG 12 Lead  -     Ambulatory Referral to Cardiology    2. Chest pain, exertional  -     Adult Transthoracic Echo Complete W/ Cont if Necessary Per Protocol; Future  -     Holter Monitor - 72 Hour Up To 15 Days; Future  -     Stress test with myocardial perfusion; Future  -     Ambulatory Referral to Cardiology    3. Major depressive disorder, remission status unspecified, unspecified whether recurrent    Continue current regimen; monitor symptoms.       Explained and discussed patient's condition and plan of care.  Discussed when to follow-up.  Discussed possible red flags and how to follow-up with those.  Viewed patient's medications and discussed common side effects. Patient to continue current medications as advised.  Be compliant with medications. Patient to let me know if they have any worsening, no improvement, does not tolerate medication, or any future concerns about treatment. ER for emergencies.  Patient verbalized an understanding and agreement with plan of care.        Follow Up:   I will discuss f/u pending tests. Otherwise 6 weeks.   ECTOR Alvarado  [facility]

## 2022-08-29 DIAGNOSIS — N83.202 CYST OF LEFT OVARY: Primary | ICD-10-CM

## 2022-09-01 ENCOUNTER — OFFICE VISIT (OUTPATIENT)
Dept: CARDIOLOGY | Facility: HOSPITAL | Age: 27
End: 2022-09-01

## 2022-09-01 ENCOUNTER — HOSPITAL ENCOUNTER (OUTPATIENT)
Dept: CARDIOLOGY | Facility: HOSPITAL | Age: 27
Discharge: HOME | End: 2022-09-01

## 2022-09-01 VITALS
HEART RATE: 119 BPM | RESPIRATION RATE: 18 BRPM | DIASTOLIC BLOOD PRESSURE: 93 MMHG | OXYGEN SATURATION: 98 % | WEIGHT: 181.2 LBS | TEMPERATURE: 97.9 F | SYSTOLIC BLOOD PRESSURE: 156 MMHG | HEIGHT: 63 IN | BODY MASS INDEX: 32.11 KG/M2

## 2022-09-01 DIAGNOSIS — R55 NEAR SYNCOPE: ICD-10-CM

## 2022-09-01 DIAGNOSIS — R00.0 TACHYCARDIA: Primary | ICD-10-CM

## 2022-09-01 DIAGNOSIS — I10 PRIMARY HYPERTENSION: ICD-10-CM

## 2022-09-01 DIAGNOSIS — R07.9 CHEST PAIN, UNSPECIFIED TYPE: ICD-10-CM

## 2022-09-01 DIAGNOSIS — R00.0 TACHYCARDIA: ICD-10-CM

## 2022-09-01 PROCEDURE — 93246 EXT ECG>7D<15D RECORDING: CPT

## 2022-09-01 PROCEDURE — 99214 OFFICE O/P EST MOD 30 MIN: CPT | Performed by: NURSE PRACTITIONER

## 2022-09-01 NOTE — PROGRESS NOTES
"[facility]  Heart and Valve Center    Chief Complaint  Chest Pain and Establish Care    Subjective    History of Present Illness {CC  Problem List  Visit  Diagnosis   Encounters  Notes  Medications  Labs  Result Review Imaging  Media :23}     Elicia Arndt is a 27 y.o. female with HTN and anxiety/depression who presents today for chest pain and tachycardia at the request of Arina BARNEY.      Patient was evaluated at  ED on 8/16 for chest pain and tachycardia.  Troponins were normal.  Labs benign, except mildly elevated LFTs. A nuclear stress test and echo was ordered by her PCP. Reports that she has been having chest pain and tachycardia for about a year. Happens daily. Feels like an \"elephant sitting on her chest\" that is worse with activity. Notes associated heart racing, near syncope and exertional dyspnea. Reports that HRs range from 50-130s.  No syncope. Denies irregular heart beats. Uses propranolol PRN. She reports that she has gained about 40lbs in the past year. Reports generalized swelling in legs and arms. She is frustrated by this because she does not understand the cause. 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  Physical Exam  Vitals reviewed.   Constitutional:       Appearance: Normal " appearance.   HENT:      Head: Normocephalic.   Neck:      Vascular: No carotid bruit.   Cardiovascular:      Rate and Rhythm: Regular rhythm. Tachycardia present.      Pulses: Normal pulses.      Heart sounds: Normal heart sounds, S1 normal and S2 normal. No murmur heard.  Pulmonary:      Effort: Pulmonary effort is normal. No respiratory distress.      Breath sounds: Normal breath sounds.   Chest:      Chest wall: No tenderness.   Abdominal:      General: Abdomen is flat.      Palpations: Abdomen is soft.   Musculoskeletal:      Cervical back: Neck supple.      Right lower leg: No edema.      Left lower leg: No edema.   Skin:     General: Skin is warm and dry.   Neurological:      General: No focal deficit present.      Mental Status: She is alert and oriented to person, place, and time. Mental status is at baseline.   Psychiatric:         Mood and Affect: Mood normal.         Behavior: Behavior normal.         Thought Content: Thought content normal.              Result Review  Data Reviewed:{ Labs  Result Review  Imaging  Med Tab  Media :23}   Troponin T, High Sensitivity, 2 Hour, Plasma (08/16/2022 18:14)  Urinalysis With Microscopic If Indicated (No Culture) - (08/16/2022 15:59)  TROPONIN (IN-HOUSE) (08/16/2022 15:43)  D-DIMER, QUANTITATIVE (08/16/2022 15:43)  CBC AND DIFFERENTIAL (08/16/2022 15:43)  hCG qualitative pregnancy (08/16/2022 15:43)  TSH (08/16/2022 15:43)  Magnesium (08/16/2022 15:43)  COMPREHENSIVE METABOLIC PANEL (08/16/2022 15:43)  ECG 12 Lead (08/25/2022 09:41)    Consultant notes Arina BARNEY    ED notes         Assessment and Plan {CC Problem List  Visit Diagnosis  ROS  Review (Popup)  Health Maintenance  Quality  BestPractice  Medications  SmartSets  SnapShot Encounters  Media :23}   1. Tachycardia  Possible inappropriate sinus tachycardia but reports HRs do drop in the 50s at rest, recommend to continue propranolol as needed for now  Echo scheduled for next  week  - Holter Monitor - 72 Hour Up To 15 Days; Future    2. Near syncope  Echo next week  - Holter Monitor - 72 Hour Up To 15 Days; Future    3. Chest pain, unspecified type  Echo and stress test pending    4. Hypertension  Elevated today, reports usually 120-130s  Consider addition of daily beta blocker pending monitor results    Follow Up {Instructions Charge Capture  Follow-up Communications :23}   Return in about 6 weeks (around 10/13/2022) for Video Visit, Office follow up.    Patient was given instructions and counseling regarding her condition or for health maintenance advice. Please see specific information pulled into the AVS if appropriate.  Advised to call the Heart and Valve Center with any questions, concerns, or worsening symptoms.

## 2022-09-01 NOTE — PROGRESS NOTES
[facility] Heart Monitor Documentation    Elicia Arndt  1995  [id number]  09/01/22      [] ZIO XT Patch  Model [id number] Prescribed for N/A Days    · Serial Number: (N + 9 Digits) N   · Apply-By Date on Box:   · USPS Tracking Number:   · USPS Tracking        [] [employer]ian MINI PLUS Mobile Cardiac Telemetry  Model BGMINIPLUS Prescribed for N/A Days    · Serial Number: (BGM + 7 Digits) BGM  · Shipped-By Date on Box:   · UPS Tracking Number: [id number]  · UPS Tracking      [] [employer]ian MINI Holter Monitor  Model BGMINIEL Prescribed for 14 Days    · Serial Number: (7 Digits) [id number]  · Shipped-By Date on Box: [id number]  · UPS Tracking Number: [id number]  · UPS Tracking        This monitor was applied to the patient's chest and checked for proper functioning.  Ms. Elicia Arndt was instructed in the proper use of this monitor.  She was given the opportunity to ask questions and left the office with the device 's instruction manual.    Adwoa Almaguer MA, 15:32 EDT, 09/01/22                  [facility]

## 2022-09-09 ENCOUNTER — HOSPITAL ENCOUNTER (OUTPATIENT)
Dept: CARDIOLOGY | Facility: HOSPITAL | Age: 27
Discharge: HOME | End: 2022-09-09
Admitting: NURSE PRACTITIONER

## 2022-09-09 DIAGNOSIS — R00.0 TACHYCARDIA: ICD-10-CM

## 2022-09-09 DIAGNOSIS — R07.9 CHEST PAIN, EXERTIONAL: ICD-10-CM

## 2022-09-09 LAB
BH CV ECHO MEAS - AO MAX PG: 11.3 MMHG
BH CV ECHO MEAS - AO MEAN PG: 5.9 MMHG
BH CV ECHO MEAS - AO ROOT DIAM: 2.7 CM
BH CV ECHO MEAS - AO V2 MAX: 167.7 CM/SEC
BH CV ECHO MEAS - AO V2 VTI: 28.6 CM
BH CV ECHO MEAS - AVA(I,D): 2.8 CM2
BH CV ECHO MEAS - EDV(CUBED): 54.5 ML
BH CV ECHO MEAS - EDV(MOD-SP2): 75.5 ML
BH CV ECHO MEAS - EDV(MOD-SP4): 62.9 ML
BH CV ECHO MEAS - EF(MOD-BP): 64 %
BH CV ECHO MEAS - EF(MOD-SP2): 66.2 %
BH CV ECHO MEAS - EF(MOD-SP4): 60.7 %
BH CV ECHO MEAS - ESV(CUBED): 9.8 ML
BH CV ECHO MEAS - ESV(MOD-SP2): 25.5 ML
BH CV ECHO MEAS - ESV(MOD-SP4): 24.7 ML
BH CV ECHO MEAS - FS: 43.7 %
BH CV ECHO MEAS - IVS/LVPW: 0.97 CM
BH CV ECHO MEAS - IVSD: 0.77 CM
BH CV ECHO MEAS - LA DIMENSION: 3.2 CM
BH CV ECHO MEAS - LAT PEAK E' VEL: 24.7 CM/SEC
BH CV ECHO MEAS - LV MASS(C)D: 83.4 GRAMS
BH CV ECHO MEAS - LV MAX PG: 8.5 MMHG
BH CV ECHO MEAS - LV MEAN PG: 3.4 MMHG
BH CV ECHO MEAS - LV V1 MAX: 145.7 CM/SEC
BH CV ECHO MEAS - LV V1 VTI: 25.2 CM
BH CV ECHO MEAS - LVIDD: 3.8 CM
BH CV ECHO MEAS - LVIDS: 2.14 CM
BH CV ECHO MEAS - LVOT AREA: 3.1 CM2
BH CV ECHO MEAS - LVOT DIAM: 1.99 CM
BH CV ECHO MEAS - LVPWD: 0.8 CM
BH CV ECHO MEAS - MED PEAK E' VEL: 14.2 CM/SEC
BH CV ECHO MEAS - MV A MAX VEL: 113.3 CM/SEC
BH CV ECHO MEAS - MV DEC TIME: 0.14 MSEC
BH CV ECHO MEAS - MV E MAX VEL: 93.2 CM/SEC
BH CV ECHO MEAS - MV E/A: 0.82
BH CV ECHO MEAS - PA ACC TIME: 0.15 SEC
BH CV ECHO MEAS - PA PR(ACCEL): 9.3 MMHG
BH CV ECHO MEAS - PA V2 MAX: 127.6 CM/SEC
BH CV ECHO MEAS - SV(LVOT): 78.8 ML
BH CV ECHO MEAS - SV(MOD-SP2): 50 ML
BH CV ECHO MEAS - SV(MOD-SP4): 38.2 ML
BH CV ECHO MEAS - TAPSE (>1.6): 2.15 CM
BH CV ECHO MEASUREMENTS AVERAGE E/E' RATIO: 4.79
BH CV VAS BP LEFT ARM: NORMAL MMHG
BH CV XLRA - RV BASE: 3.1 CM
BH CV XLRA - RV LENGTH: 5.5 CM
BH CV XLRA - RV MID: 2.16 CM
BH CV XLRA - TDI S': 15 CM/SEC
LEFT ATRIUM VOLUME INDEX: 14.8 ML/M2
MAXIMAL PREDICTED HEART RATE: 193 BPM
STRESS TARGET HR: 164 BPM

## 2022-09-09 PROCEDURE — 93306 TTE W/DOPPLER COMPLETE: CPT

## 2022-09-28 ENCOUNTER — HOSPITAL ENCOUNTER (OUTPATIENT)
Dept: CARDIOLOGY | Facility: HOSPITAL | Age: 27
Discharge: HOME | End: 2022-09-28
Admitting: NURSE PRACTITIONER

## 2022-09-28 DIAGNOSIS — R07.9 CHEST PAIN, EXERTIONAL: ICD-10-CM

## 2022-09-28 LAB
BH CV REST NUCLEAR ISOTOPE DOSE: 9.4 MCI
BH CV STRESS BP STAGE 1: NORMAL
BH CV STRESS BP STAGE 2: NORMAL
BH CV STRESS BP STAGE 3: NORMAL
BH CV STRESS DURATION MIN STAGE 1: 3
BH CV STRESS DURATION MIN STAGE 2: 3
BH CV STRESS DURATION MIN STAGE 3: 2
BH CV STRESS DURATION SEC STAGE 1: 0
BH CV STRESS DURATION SEC STAGE 2: 0
BH CV STRESS DURATION SEC STAGE 3: 59
BH CV STRESS GRADE STAGE 1: 10
BH CV STRESS GRADE STAGE 2: 12
BH CV STRESS GRADE STAGE 3: 14
BH CV STRESS HR STAGE 1: 146
BH CV STRESS HR STAGE 2: 162
BH CV STRESS HR STAGE 3: 184
BH CV STRESS METS STAGE 1: 5
BH CV STRESS METS STAGE 2: 7.5
BH CV STRESS METS STAGE 3: 10
BH CV STRESS NUCLEAR ISOTOPE DOSE: 32.7 MCI
BH CV STRESS O2 STAGE 1: 100
BH CV STRESS O2 STAGE 2: 100
BH CV STRESS O2 STAGE 3: 99
BH CV STRESS PROTOCOL 1: NORMAL
BH CV STRESS RECOVERY BP: NORMAL MMHG
BH CV STRESS RECOVERY HR: 118 BPM
BH CV STRESS RECOVERY O2: 98 %
BH CV STRESS SPEED STAGE 1: 1.7
BH CV STRESS SPEED STAGE 2: 2.5
BH CV STRESS SPEED STAGE 3: 3.4
BH CV STRESS STAGE 1: 1
BH CV STRESS STAGE 2: 2
BH CV STRESS STAGE 3: 3
LV EF NUC BP: 89 %
MAXIMAL PREDICTED HEART RATE: 193 BPM
PERCENT MAX PREDICTED HR: 95.34 %
STRESS BASELINE BP: NORMAL MMHG
STRESS BASELINE HR: 100 BPM
STRESS O2 SAT REST: 100 %
STRESS PERCENT HR: 112 %
STRESS POST EXERCISE DUR MIN: 8 MIN
STRESS POST EXERCISE DUR SEC: 59 SEC
STRESS POST O2 SAT PEAK: 99 %
STRESS POST PEAK BP: NORMAL MMHG
STRESS POST PEAK HR: 184 BPM
STRESS TARGET HR: 164 BPM

## 2022-09-28 PROCEDURE — A9500 TC99M SESTAMIBI: HCPCS | Performed by: NURSE PRACTITIONER

## 2022-09-28 PROCEDURE — 93018 CV STRESS TEST I&R ONLY: CPT | Performed by: INTERNAL MEDICINE

## 2022-09-28 PROCEDURE — 78452 HT MUSCLE IMAGE SPECT MULT: CPT | Performed by: INTERNAL MEDICINE

## 2022-09-28 PROCEDURE — 0 TECHNETIUM SESTAMIBI: Performed by: NURSE PRACTITIONER

## 2022-09-28 PROCEDURE — 78452 HT MUSCLE IMAGE SPECT MULT: CPT

## 2022-09-28 PROCEDURE — 93017 CV STRESS TEST TRACING ONLY: CPT

## 2022-09-28 RX ADMIN — TECHNETIUM TC 99M SESTAMIBI 1 DOSE: 1 INJECTION INTRAVENOUS at 10:20

## 2022-09-28 RX ADMIN — TECHNETIUM TC 99M SESTAMIBI 1 DOSE: 1 INJECTION INTRAVENOUS at 08:35

## 2022-10-05 ENCOUNTER — PATIENT ROUNDING (BHMG ONLY) (OUTPATIENT)
Dept: OBSTETRICS AND GYNECOLOGY | Facility: CLINIC | Age: 27
End: 2022-10-05

## 2022-10-05 ENCOUNTER — OFFICE VISIT (OUTPATIENT)
Dept: OBSTETRICS AND GYNECOLOGY | Facility: CLINIC | Age: 27
End: 2022-10-05

## 2022-10-05 VITALS
WEIGHT: 180 LBS | HEIGHT: 63 IN | SYSTOLIC BLOOD PRESSURE: 148 MMHG | BODY MASS INDEX: 31.89 KG/M2 | DIASTOLIC BLOOD PRESSURE: 76 MMHG

## 2022-10-05 DIAGNOSIS — N83.202 LEFT OVARIAN CYST: Primary | ICD-10-CM

## 2022-10-05 DIAGNOSIS — R10.2 PELVIC PAIN: ICD-10-CM

## 2022-10-05 PROCEDURE — 99213 OFFICE O/P EST LOW 20 MIN: CPT | Performed by: NURSE PRACTITIONER

## 2022-10-05 NOTE — PROGRESS NOTES
A Namshi message has been sent to the patient for PATIENT ROUNDING with [facility] [place].

## 2022-10-05 NOTE — PROGRESS NOTES
Chief Complaint  Elicia Arndt is a 27 y.o.  female presenting for Gynecologic Exam (New GYN, problem visit.) and Ovarian Cyst (Left ovarian cyst per CT scan 22.)    History of Present Illness  Elicia is a 26yo woman, , with a Mirena IUD for contraception.  She is having intermittent lower abd pains, sharp & severe, several days/ week for the past couple of weeks.  Recent CT @  revealed a left ovarian cyst (2cm size).  No dysuria.  No change in bowel function.  No vaginitis sx.  No dyspareunia.  No postcoital bleeding.  She is in a stable & monogamous marriage; not at risk for STDs.  No fever/chills/ or flank pain.  No upper abd pain.    The following portions of the patient's history were reviewed and updated as appropriate: allergies, current medications, past family history, past medical history, past social history, past surgical history and problem list.    Allergies   Allergen Reactions   • Adhesive Tape Hives     Red and hives           Current Outpatient Medications:   •  buPROPion XL (Wellbutrin XL) 300 MG 24 hr tablet, Take 1 tablet by mouth Daily., Disp: 30 tablet, Rfl: 2  •  hydrOXYzine (ATARAX) 25 MG tablet, Take 1/2 to 1 tablet by mouth every night for sleep/ anxiety., Disp: 30 tablet, Rfl: 2  •  levonorgestrel (MIRENA) 20 MCG/24HR IUD, 1 each by Intrauterine route 1 (One) Time., Disp: , Rfl:   •  losartan-hydrochlorothiazide (Hyzaar) 50-12.5 MG per tablet, Take 1 tablet by mouth Daily., Disp: 30 tablet, Rfl: 2  •  methocarbamol (ROBAXIN) 750 MG tablet, Take 1 tablet by mouth 4 (Four) Times a Day As Needed for Muscle Spasms., Disp: 60 tablet, Rfl: 0  •  naproxen (Naprosyn) 500 MG tablet, Take 1 tablet by mouth 2 (Two) Times a Day With Meals. (Patient taking differently: Take 500 mg by mouth 2 (Two) Times a Day As Needed.), Disp: 60 tablet, Rfl: 0  •  propranolol (INDERAL) 20 MG tablet, Take 1 tablet by mouth 2 (Two) Times a Day As Needed (anxiety/ elevated BP)., Disp: 60 tablet, Rfl:  "2  •  venlafaxine XR (EFFEXOR-XR) 75 MG 24 hr capsule, Take 1 capsule by mouth Daily., Disp: 30 capsule, Rfl: 5    Past Medical History:   Diagnosis Date   • Anxiety and depression    • Hypertension    • Rotator cuff tendinitis, right 2010    MRI and Ortho and PT 2019        Past Surgical History:   Procedure Laterality Date   • NO PAST SURGERIES     • WISDOM TOOTH EXTRACTION         Objective  /76   Ht 160 cm (63\")   Wt 81.6 kg (180 lb)   Breastfeeding No   BMI 31.89 kg/m²     Physical Exam  Exam conducted with a chaperone present.   Constitutional:       Appearance: Normal appearance.   Abdominal:      Palpations: Abdomen is soft. There is no mass.      Tenderness: There is no abdominal tenderness.   Genitourinary:     General: Normal vulva.      Labia:         Right: No rash, tenderness or lesion.         Left: No rash, tenderness or lesion.       Vagina: Normal. No erythema.      Cervix: No cervical motion tenderness, discharge, lesion or erythema.      Uterus: Normal. Not enlarged and not tender.       Adnexa: Right adnexa normal and left adnexa normal.        Right: No mass or tenderness.          Left: No mass or tenderness.        Rectum: Normal.      Comments: Small amt of normal appearing white flocculent discharge;  IUD strings ~2-2.5cm long.  No CMT.  I cannot appreciate the ovarian cyst.    Anus appears wnl.  (No rectal exam performed.)        Assessment/Plan   Diagnoses and all orders for this visit:    1. Left ovarian cyst (Primary)  -     US Non-ob Transvaginal; Future    2. Pelvic pain  -     US Non-ob Transvaginal; Future    She would like to go back to Nexplanon.    Will ck insurance coverage & then RTC for both Nexplanon insertion & IUD removal on same visit.  US today if possible.  To notify us promptly prn any worsening symptoms.    Procedures            Return in about 2 weeks (around 10/19/2022) for 2 wks for IUD removal & Nexplanon insertion.    Christina Shirley, " APRN  10/05/2022

## 2022-10-10 PROCEDURE — 93248 EXT ECG>7D<15D REV&INTERPJ: CPT | Performed by: INTERNAL MEDICINE

## 2022-10-10 NOTE — PROGRESS NOTES
"[facility]  Heart and Valve Center      Mode of Visit: Telephone  Location of patient: other: work (private location)  You have chosen to receive care through a telehealth visit.  Does the patient consent to use a video/audio connection for your medical care today? Yes  The visit included audio interaction. Patient unable to get [employer] video visit to work    Chief Complaint  Follow-up and Rapid Heart Rate    History of Present Illness    Elicia Arndt is a 27 y.o. female with hypertension and anxiety/depression who presents today as a telemedicine follow-up for chest pain and palpitations.    Her PCP ordered a nuclear stress test, which showed no evidence of ischemia.  Echo also was normal.  She was placed in a 2-week Holter monitor which showed no arrhythmias, average HR was 100. Still feels racing heart beats. Takes propranolol PRN, which helps with the heart rates but not her symptoms. Still feels \"crushing\" chest pains. No aggravating or relieving factors     Reports blood pressures at home usually around 140s          Objective     Vital Signs:   Vitals:    10/11/22 1259   Pulse: 101   Weight: 81.6 kg (180 lb)   Height: 160 cm (63\")     Body mass index is 31.89 kg/m².    Virtual Visit Physical Exam  Physical Exam  Neurological:      Mental Status: She is alert and oriented to person, place, and time.   Psychiatric:         Mood and Affect: Mood normal.         Behavior: Behavior normal.              Result Review  Data Reviewed:{ Labs  Result Review  Imaging  Med Tab  Media :23}   Adult Transthoracic Echo Complete W/ Cont if Necessary Per Protocol (09/09/2022 16:23)  Stress test with myocardial perfusion (09/28/2022 10:32)  Holter Monitor - 72 Hour Up To 15 Days (09/19/2022 10:35)             Assessment and Plan {CC Problem List  Visit Diagnosis  ROS  Review (Popup)  Health Maintenance  Quality  BestPractice  Medications  SmartSets  SnapShot Encounters  Media :23}   1. " Inappropriate sinus tachycardia  Average  on monitor but no arrhythmias. Will stop propranolol PRN and start metoprolol succinate 25mg daily. Discussed potential side effects, advised to call if experiencing any  Encouraged regular exercise and adequate hydration    2. Primary hypertension  She did not take BP today but readings at home above goal. Start metoprolol succinate and continue to monitor and bring readings to follow up    3. Chest pain  Normal nuclear stress test  Low ASCVD risk  Recommend following up with PCP for work up of noncardiac causes of CP  Symptoms could be aggravated by tachycardia, will see if metoprolol improves symptoms      Unable to complete visit using a video connection to the patient. A phone visit was used to complete this visits. Total time of discussion was 15 minutes.    Follow Up {Instructions Charge Capture  Follow-up Communications :23}   Return in about 4 weeks (around 11/8/2022) for inappropriate sinus tachycardia.    Patient was given instructions and counseling regarding her condition or for health maintenance advice. Please see specific information pulled into the AVS if appropriate.  Advised to call the Heart and Valve Center with any questions, concerns, or worsening symptoms.    Dictated Utilizing Dragon Dictation

## 2022-10-11 ENCOUNTER — TELEPHONE (OUTPATIENT)
Dept: CARDIOLOGY | Facility: HOSPITAL | Age: 27
End: 2022-10-11

## 2022-10-11 ENCOUNTER — TELEMEDICINE (OUTPATIENT)
Dept: CARDIOLOGY | Facility: HOSPITAL | Age: 27
End: 2022-10-11

## 2022-10-11 VITALS — HEIGHT: 63 IN | HEART RATE: 101 BPM | WEIGHT: 180 LBS | BODY MASS INDEX: 31.89 KG/M2

## 2022-10-11 DIAGNOSIS — R00.0 INAPPROPRIATE SINUS TACHYCARDIA: Primary | ICD-10-CM

## 2022-10-11 DIAGNOSIS — I10 PRIMARY HYPERTENSION: ICD-10-CM

## 2022-10-11 PROCEDURE — 99213 OFFICE O/P EST LOW 20 MIN: CPT | Performed by: NURSE PRACTITIONER

## 2022-10-11 RX ORDER — METOPROLOL SUCCINATE 25 MG/1
25 TABLET, EXTENDED RELEASE ORAL DAILY
Qty: 30 TABLET | Refills: 3 | Status: SHIPPED | OUTPATIENT
Start: 2022-10-11 | End: 2023-01-20 | Stop reason: SDUPTHER

## 2022-10-26 ENCOUNTER — OFFICE VISIT (OUTPATIENT)
Dept: OBSTETRICS AND GYNECOLOGY | Facility: CLINIC | Age: 27
End: 2022-10-26

## 2022-10-26 VITALS
DIASTOLIC BLOOD PRESSURE: 84 MMHG | BODY MASS INDEX: 31.86 KG/M2 | WEIGHT: 179.8 LBS | HEIGHT: 63 IN | SYSTOLIC BLOOD PRESSURE: 122 MMHG

## 2022-10-26 DIAGNOSIS — Z30.017 ENCOUNTER FOR INITIAL PRESCRIPTION OF IMPLANTABLE SUBDERMAL CONTRACEPTIVE: Primary | ICD-10-CM

## 2022-10-26 PROCEDURE — 11981 INSERTION DRUG DLVR IMPLANT: CPT | Performed by: NURSE PRACTITIONER

## 2022-10-26 PROCEDURE — 58301 REMOVE INTRAUTERINE DEVICE: CPT | Performed by: NURSE PRACTITIONER

## 2022-10-26 NOTE — PROGRESS NOTES
Chief Complaint  Elicia Arndt is a 27 y.o.  female presenting for Procedure (IUD removal/Nexplanon insertion)    History of Present Illness  Elicia is a very pleasant 28yo woman, , here for IUD removal and insertion of a Nexplanon.  (She has had Nexplanon in the past, and liked it.  She has not liked the intermittent sharp pelvic pains with IUD.)  Amenorrhea noted with the IUD.      The following portions of the patient's history were reviewed and updated as appropriate: allergies, current medications, past family history, past medical history, past social history, past surgical history and problem list.    Allergies   Allergen Reactions   • Adhesive Tape Hives     Red and hives           Current Outpatient Medications:   •  buPROPion XL (Wellbutrin XL) 300 MG 24 hr tablet, Take 1 tablet by mouth Daily., Disp: 30 tablet, Rfl: 2  •  hydrOXYzine (ATARAX) 25 MG tablet, Take 1/2 to 1 tablet by mouth every night for sleep/ anxiety., Disp: 30 tablet, Rfl: 2  •  levonorgestrel (MIRENA) 20 MCG/24HR IUD, 1 each by Intrauterine route 1 (One) Time., Disp: , Rfl:   •  losartan-hydrochlorothiazide (Hyzaar) 50-12.5 MG per tablet, Take 1 tablet by mouth Daily., Disp: 30 tablet, Rfl: 2  •  methocarbamol (ROBAXIN) 750 MG tablet, Take 1 tablet by mouth 4 (Four) Times a Day As Needed for Muscle Spasms., Disp: 60 tablet, Rfl: 0  •  metoprolol succinate XL (TOPROL-XL) 25 MG 24 hr tablet, Take 1 tablet by mouth Daily., Disp: 30 tablet, Rfl: 3  •  naproxen (Naprosyn) 500 MG tablet, Take 1 tablet by mouth 2 (Two) Times a Day With Meals. (Patient taking differently: Take 1 tablet by mouth 2 (Two) Times a Day As Needed.), Disp: 60 tablet, Rfl: 0  •  venlafaxine XR (EFFEXOR-XR) 75 MG 24 hr capsule, Take 1 capsule by mouth Daily., Disp: 30 capsule, Rfl: 5    Past Medical History:   Diagnosis Date   • Anxiety and depression    • Hypertension    • Rotator cuff tendinitis, right 2010    MRI and Ortho and PT 2019        Past Surgical  "History:   Procedure Laterality Date   • NO PAST SURGERIES     • WISDOM TOOTH EXTRACTION         Objective  /84   Ht 160 cm (63\")   Wt 81.6 kg (179 lb 12.8 oz)   LMP  (LMP Unknown)   Breastfeeding No   BMI 31.85 kg/m²     Physical Exam    Assessment/Plan   Diagnoses and all orders for this visit:    1. Encounter for initial prescription of implantable subdermal contraceptive (Primary)  -     Remove & Insert Drug Implant        Remove & Insert Drug Implant    Date/Time: 10/26/2022 2:16 PM  Performed by: Christina Shirley APRN  Authorized by: Christina Shirley APRN   Local anesthesia used: no    Anesthesia:  Local anesthesia used: no    Sedation:  Patient sedated: no    Patient tolerance: patient tolerated the procedure well with no immediate complications  Comments: She verbalizes good understanding of the Nexplanon & the insertion procedure.  (She has had this method in the past, and liked it.)  Under sterile conditions, and according to customary protocol, the medial aspect of the upper left arm was measured, marked, and numbed.  The Nexplanon device was placed easily into the subdermal space, and I was able to palpate it in place.  (Pt refused to palpate for it.)  It was covered with a sterile telfa-type (small) band-aid, then wrapped with a pressure dressing.  She tolerated well, and was able to verbalize good understanding of the aftercare instructions.      The IUD strings were easily visualized and grasped with Ring Forceps.  The Mirena IUD was gently removed and pt tolerated very well.              Return in about 4 weeks (around 11/23/2022) for Recheck (FU Nexplanon insertion).    ECTOR Do  10/26/2022  "

## 2022-11-11 DIAGNOSIS — F33.9 RECURRENT MAJOR DEPRESSIVE DISORDER, REMISSION STATUS UNSPECIFIED: ICD-10-CM

## 2022-11-11 DIAGNOSIS — I10 HYPERTENSION, UNSPECIFIED TYPE: ICD-10-CM

## 2022-11-11 RX ORDER — LOSARTAN POTASSIUM AND HYDROCHLOROTHIAZIDE 12.5; 5 MG/1; MG/1
1 TABLET ORAL DAILY
Qty: 30 TABLET | Refills: 0 | Status: SHIPPED | OUTPATIENT
Start: 2022-11-11 | End: 2022-11-16

## 2022-11-11 RX ORDER — BUPROPION HYDROCHLORIDE 300 MG/1
300 TABLET ORAL DAILY
Qty: 30 TABLET | Refills: 2 | Status: SHIPPED | OUTPATIENT
Start: 2022-11-11 | End: 2023-02-10

## 2022-11-11 NOTE — TELEPHONE ENCOUNTER
Caller: Elicia Arndt    Relationship: Self    Best call back number: [phone]    Requested Prescriptions:   Requested Prescriptions     Pending Prescriptions Disp Refills   • losartan-hydrochlorothiazide (Hyzaar) 50-12.5 MG per tablet 30 tablet 2     Sig: Take 1 tablet by mouth Daily.   • buPROPion XL (Wellbutrin XL) 300 MG 24 hr tablet 30 tablet 2     Sig: Take 1 tablet by mouth Daily.        Pharmacy where request should be sent: [facility] [id number] [address] [phone] [facility] [phone]      Additional details provided by patient: PATIENT IS OUT OF THE MEDICATION AND HAS BEEN FOR 2 DAYS.     Does the patient have less than a 3 day supply:  [x] Yes  [] No    Jamal Patiño Rep   11/11/22 08:13 EST

## 2022-11-15 NOTE — PROGRESS NOTES
"[facility]  Heart and Valve Center      Chief Complaint  Rapid Heart Rate and Follow-up    History of Present Illness    Elicia Arndt is a 27 y.o. female with hypertension and anxiety/depression who presents today as a telemedicine follow-up for chest pain and palpitations.    Her PCP ordered a nuclear stress test, which showed no evidence of ischemia.  Echo also was normal.  She was placed in a 2-week Holter monitor which showed no arrhythmias, average HR was 100. She was started on metoprolol succinate at last visit and reports that symptoms have improved. Denies shortness of breath or chest pain. Reports her BPs are still elevated at times. HRs in the 50s at rest.  She reports ongoing swelling of her hands and feet      Objective     Vital Signs:   Vitals:    11/16/22 0854   BP: 134/76   BP Location: Right arm   Patient Position: Sitting   Cuff Size: Adult   Pulse: 93   Resp: 20   Temp: 97.3 °F (36.3 °C)   TempSrc: Temporal   SpO2: 98%   Weight: 79.6 kg (175 lb 8 oz)   Height: 160 cm (63\")     Body mass index is 31.09 kg/m².      Physical Exam  Vitals reviewed.   Constitutional:       Appearance: Normal appearance.   HENT:      Head: Normocephalic.   Neck:      Vascular: No carotid bruit.   Cardiovascular:      Rate and Rhythm: Normal rate and regular rhythm.      Pulses: Normal pulses.      Heart sounds: Normal heart sounds, S1 normal and S2 normal. No murmur heard.  Pulmonary:      Effort: Pulmonary effort is normal. No respiratory distress.      Breath sounds: Normal breath sounds.   Chest:      Chest wall: No tenderness.   Abdominal:      General: Abdomen is flat.      Palpations: Abdomen is soft.   Musculoskeletal:      Cervical back: Neck supple.      Right lower leg: No edema.      Left lower leg: No edema.   Skin:     General: Skin is warm and dry.   Neurological:      General: No focal deficit present.      Mental Status: She is alert and oriented to person, place, and time. " Mental status is at baseline.   Psychiatric:         Mood and Affect: Mood normal.         Behavior: Behavior normal.         Thought Content: Thought content normal.              Result Review  Data Reviewed:{ Labs  Result Review  Imaging  Med Tab  Media :23}   Adult Transthoracic Echo Complete W/ Cont if Necessary Per Protocol (09/09/2022 16:23)  Stress test with myocardial perfusion (09/28/2022 10:32)  Holter Monitor - 72 Hour Up To 15 Days (09/19/2022 10:35)             Assessment and Plan {CC Problem List  Visit Diagnosis  ROS  Review (Popup)  Health Maintenance  Quality  BestPractice  Medications  SmartSets  SnapShot Encounters  Media :23}   1. Inappropriate sinus tachycardia  Average  on monitor but no arrhythmias.   Symptoms improved with metoprolol succinate 25 mg daily   Encouraged regular exercise and adequate hydration    2. Primary hypertension  She not take her medications today.  I encouraged her to bring in her blood pressure cuff to her job to verify its accuracy.  We will go ahead and increase her hydrochlorothiazide to 25 mg due to complaints of swelling.  Continue losartan 50 mg.  Discussed goal blood pressure of 130/80 and advised to call if it is consistently elevated            Follow Up {Instructions Charge Capture  Follow-up Communications :23}   Return in about 6 months (around 5/16/2023) for Office follow up, tachycardia, HTN.    Patient was given instructions and counseling regarding her condition or for health maintenance advice. Please see specific information pulled into the AVS if appropriate.  Advised to call the Heart and Valve Center with any questions, concerns, or worsening symptoms.    Dictated Utilizing Dragon Dictation

## 2022-11-16 ENCOUNTER — OFFICE VISIT (OUTPATIENT)
Dept: CARDIOLOGY | Facility: HOSPITAL | Age: 27
End: 2022-11-16

## 2022-11-16 VITALS
RESPIRATION RATE: 20 BRPM | HEART RATE: 93 BPM | BODY MASS INDEX: 31.1 KG/M2 | OXYGEN SATURATION: 98 % | TEMPERATURE: 97.3 F | WEIGHT: 175.5 LBS | SYSTOLIC BLOOD PRESSURE: 134 MMHG | DIASTOLIC BLOOD PRESSURE: 76 MMHG | HEIGHT: 63 IN

## 2022-11-16 DIAGNOSIS — I10 PRIMARY HYPERTENSION: ICD-10-CM

## 2022-11-16 DIAGNOSIS — R00.0 INAPPROPRIATE SINUS TACHYCARDIA: Primary | ICD-10-CM

## 2022-11-16 PROCEDURE — 99213 OFFICE O/P EST LOW 20 MIN: CPT | Performed by: NURSE PRACTITIONER

## 2022-11-16 RX ORDER — LOSARTAN POTASSIUM 50 MG/1
50 TABLET ORAL DAILY
Qty: 30 TABLET | Refills: 3 | Status: SHIPPED | OUTPATIENT
Start: 2022-11-16 | End: 2023-03-23

## 2022-11-16 RX ORDER — HYDROCHLOROTHIAZIDE 25 MG/1
25 TABLET ORAL DAILY
Qty: 30 TABLET | Refills: 3 | Status: SHIPPED | OUTPATIENT
Start: 2022-11-16 | End: 2023-04-03

## 2022-11-23 ENCOUNTER — OFFICE VISIT (OUTPATIENT)
Dept: OBSTETRICS AND GYNECOLOGY | Facility: CLINIC | Age: 27
End: 2022-11-23

## 2022-11-23 VITALS
HEIGHT: 63 IN | DIASTOLIC BLOOD PRESSURE: 80 MMHG | WEIGHT: 179 LBS | BODY MASS INDEX: 31.71 KG/M2 | SYSTOLIC BLOOD PRESSURE: 122 MMHG

## 2022-11-23 DIAGNOSIS — Z30.46 ENCOUNTER FOR SURVEILLANCE OF IMPLANTABLE SUBDERMAL CONTRACEPTIVE: Primary | ICD-10-CM

## 2022-11-23 PROCEDURE — 99213 OFFICE O/P EST LOW 20 MIN: CPT | Performed by: NURSE PRACTITIONER

## 2022-11-23 NOTE — PROGRESS NOTES
"Chief Complaint  Elicia Arndt is a 27 y.o.  female presenting for Follow-up (4 week follow-up after Nexplanon insertion.)    History of Present Illness  Elicia is a pleasant 27yr young woman, , here for 4 wk FU of IUD removal & insertion of Nexplanon.  She is glad to have the IUD out.  No pelvic or abd pain.  The arm healed well at the insertion site without any complications.  She did have a \"full week-long menstrual period\"  ~ 1 week after insertion.  But, no BTB since that time.  No HA, nausea, or breast tenderness.  Overall, she is happy with the method.  Will probably get pap smear with PCP.  She was encouraged to call anytime, prn.    The following portions of the patient's history were reviewed and updated as appropriate: allergies, current medications, past family history, past medical history, past social history, past surgical history and problem list.    Allergies   Allergen Reactions   • Adhesive Tape Hives     Red and hives           Current Outpatient Medications:   •  buPROPion XL (Wellbutrin XL) 300 MG 24 hr tablet, Take 1 tablet by mouth Daily., Disp: 30 tablet, Rfl: 2  •  hydroCHLOROthiazide (HYDRODIURIL) 25 MG tablet, Take 1 tablet by mouth Daily., Disp: 30 tablet, Rfl: 3  •  hydrOXYzine (ATARAX) 25 MG tablet, Take 1/2 to 1 tablet by mouth every night for sleep/ anxiety., Disp: 30 tablet, Rfl: 2  •  losartan (Cozaar) 50 MG tablet, Take 1 tablet by mouth Daily., Disp: 30 tablet, Rfl: 3  •  methocarbamol (ROBAXIN) 750 MG tablet, Take 1 tablet by mouth 4 (Four) Times a Day As Needed for Muscle Spasms., Disp: 60 tablet, Rfl: 0  •  metoprolol succinate XL (TOPROL-XL) 25 MG 24 hr tablet, Take 1 tablet by mouth Daily., Disp: 30 tablet, Rfl: 3  •  naproxen (Naprosyn) 500 MG tablet, Take 1 tablet by mouth 2 (Two) Times a Day With Meals. (Patient taking differently: Take 500 mg by mouth 2 (Two) Times a Day As Needed.), Disp: 60 tablet, Rfl: 0  •  venlafaxine XR (EFFEXOR-XR) 75 MG 24 hr capsule, " "Take 1 capsule by mouth Daily., Disp: 30 capsule, Rfl: 5    Past Medical History:   Diagnosis Date   • Anxiety and depression    • Hypertension    • Rotator cuff tendinitis, right 2010    MRI and Ortho and PT 2019        Past Surgical History:   Procedure Laterality Date   • NO PAST SURGERIES     • WISDOM TOOTH EXTRACTION         Objective  /80   Ht 160 cm (63\")   Wt 81.2 kg (179 lb)   LMP 10/28/2022 (Exact Date) Comment: Nexplanon inserted 10/26/2022  Breastfeeding No   BMI 31.71 kg/m²     Physical Exam  Vitals and nursing note reviewed.   Constitutional:       General: She is not in acute distress.     Appearance: She is not ill-appearing.   HENT:      Head: Normocephalic.   Skin:     General: Skin is warm and dry.      Comments: The medial aspect of upper left arm is well healed at the insertion site.  The Nexplanon device is easily palpated in the subdermal space.  No redness or signs of infection.  It is nontender when palpated.   Neurological:      Mental Status: She is alert and oriented to person, place, and time.         Assessment/Plan   Diagnoses and all orders for this visit:    1. Encounter for surveillance of implantable subdermal contraceptive (Primary)        Procedures            Return for prn.    Christina Shirley, APRN  11/23/2022  "

## 2022-12-02 ENCOUNTER — OFFICE VISIT (OUTPATIENT)
Dept: INTERNAL MEDICINE | Facility: CLINIC | Age: 27
End: 2022-12-02

## 2022-12-02 VITALS
BODY MASS INDEX: 29.53 KG/M2 | RESPIRATION RATE: 16 BRPM | WEIGHT: 173 LBS | HEART RATE: 92 BPM | TEMPERATURE: 98 F | SYSTOLIC BLOOD PRESSURE: 122 MMHG | OXYGEN SATURATION: 97 % | DIASTOLIC BLOOD PRESSURE: 76 MMHG | HEIGHT: 64 IN

## 2022-12-02 DIAGNOSIS — Z72.0 TOBACCO USE: ICD-10-CM

## 2022-12-02 DIAGNOSIS — R06.83 SNORES: ICD-10-CM

## 2022-12-02 DIAGNOSIS — F33.0 MAJOR DEPRESSIVE DISORDER, RECURRENT, MILD: ICD-10-CM

## 2022-12-02 DIAGNOSIS — R06.2 WHEEZING: ICD-10-CM

## 2022-12-02 DIAGNOSIS — Z00.01 ENCOUNTER FOR ROUTINE ADULT PHYSICAL EXAM WITH ABNORMAL FINDINGS: Primary | ICD-10-CM

## 2022-12-02 DIAGNOSIS — Z23 ENCOUNTER FOR IMMUNIZATION: ICD-10-CM

## 2022-12-02 DIAGNOSIS — Z13.220 ENCOUNTER FOR LIPID SCREENING FOR CARDIOVASCULAR DISEASE: ICD-10-CM

## 2022-12-02 DIAGNOSIS — J40 BRONCHITIS: ICD-10-CM

## 2022-12-02 DIAGNOSIS — Z13.6 ENCOUNTER FOR LIPID SCREENING FOR CARDIOVASCULAR DISEASE: ICD-10-CM

## 2022-12-02 DIAGNOSIS — R53.83 OTHER FATIGUE: ICD-10-CM

## 2022-12-02 PROCEDURE — 90677 PCV20 VACCINE IM: CPT | Performed by: NURSE PRACTITIONER

## 2022-12-02 PROCEDURE — 99395 PREV VISIT EST AGE 18-39: CPT | Performed by: NURSE PRACTITIONER

## 2022-12-02 PROCEDURE — 90471 IMMUNIZATION ADMIN: CPT | Performed by: NURSE PRACTITIONER

## 2022-12-02 RX ORDER — METHYLPREDNISOLONE 4 MG/1
TABLET ORAL
Qty: 21 TABLET | Refills: 0 | Status: SHIPPED | OUTPATIENT
Start: 2022-12-02

## 2022-12-02 RX ORDER — AZITHROMYCIN 250 MG/1
TABLET, FILM COATED ORAL
Qty: 6 TABLET | Refills: 0 | Status: SHIPPED | OUTPATIENT
Start: 2022-12-02

## 2022-12-02 NOTE — PATIENT INSTRUCTIONS
MyPlate from USDA  MyPlate is an outline of a general healthy diet based on the Dietary Guidelines for Americans, 0360-9937, from the U.[place]. Department of Agriculture (USDA). It sets guidelines for how much food you should eat from each food group based on your age, sex, and level of physical activity.  What are tips for following MyPlate?  To follow MyPlate recommendations:  • Eat a wide variety of fruits and vegetables, grains, and protein foods.  • Serve smaller portions and eat less food throughout the day.  • Limit portion sizes to avoid overeating.  • Enjoy your food.  • Get at least 150 minutes of exercise every week. This is about 30 minutes each day, 5 or more days per week.  It can be difficult to have every meal look like MyPlate. Think about MyPlate as eating guidelines for an entire day, rather than each individual meal.  Fruits and vegetables  1. Make one half of your plate fruits and vegetables.  2. Eat many different colors of fruits and vegetables each day.  3. For a 2,000-calorie daily food plan, eat:  ? 2½ cups of vegetables every day.  ? 2 cups of fruit every day.  4. 1 cup is equal to:  ? 1 cup raw or cooked vegetables.  ? 1 cup raw fruit.  ? 1 medium-sized orange, apple, or banana.  ? 1 cup 100% fruit or vegetable juice.  ? 2 cups raw leafy greens, such as lettuce, spinach, or kale.  ? ½ cup dried fruit.  Grains  1. One fourth of your plate should be grains.  2. Make at least half of the grains you eat each day whole grains.  3. For a 2,000-calorie daily food plan, eat 6 oz of grains every day.  4. 1 oz is equal to:  ? 1 slice bread.  ? 1 cup cereal.  ? ½ cup cooked rice, cereal, or pasta.  Protein  1. One fourth of your plate should be protein.  2. Eat a wide variety of protein foods, including meat, poultry, fish, eggs, beans, nuts, and tofu.  3. For a 2,000-calorie daily food plan, eat 5½ oz of protein every day.  4. 1 oz is equal to:  ? 1 oz meat, poultry, or fish.  ? ¼ cup cooked beans.  ? 1  egg.  ? ½ oz nuts or seeds.  ? 1 Tbsp peanut butter.  Dairy  1. Drink fat-free or low-fat (1%) milk.  2. Eat or drink dairy as a side to meals.  3. For a 2,000-calorie daily food plan, eat or drink 3 cups of dairy every day.  4. 1 cup is equal to:  ? 1 cup milk, yogurt, cottage cheese, or soy milk (soy beverage).  ? 2 oz processed cheese.  ? 1½ oz natural cheese.  Fats, oils, salt, and sugars  • Only small amounts of oils are recommended.  • Avoid foods that are high in calories and low in nutritional value (empty calories), like foods high in fat or added sugars.  • Choose foods that are low in salt (sodium). Choose foods that have less than 140 milligrams (mg) of sodium per serving.  • Drink water instead of sugary drinks. Drink enough fluid to keep your urine pale yellow.  Where to find support  • Work with your health care provider or a dietitian to develop a customized eating plan that is right for you.  • Download an donnell (mobile application) to help you track your daily food intake.  Where to find more information  • USDA: ChooseMyPlate.gov  Summary  • MyPlate is a general guideline for healthy eating from the USDA. It is based on the Dietary Guidelines for Americans, 3952-8330.  • In general, fruits and vegetables should take up one half of your plate, grains should take up one fourth of your plate, and protein should take up one fourth of your plate.  This information is not intended to replace advice given to you by your health care provider. Make sure you discuss any questions you have with your health care provider.  Document Revised: 11/08/2021 Document Reviewed: 11/08/2021  [employer] Patient Education © 2022 [employer]   IF YOU SMOKE OR USE TOBACCO PLEASE READ THE FOLLOWING:  Why is smoking bad for me?  Smoking increases the risk of heart disease, lung disease, vascular disease, stroke, and cancer. If you smoke, STOP!    For more information:  Quit Now  [place]  1-800-QUIT-NOW  https://[place].quitlogix.org/en-[place]/    Steps to Quit Smoking  Smoking tobacco is the leading cause of preventable death. It can affect almost every organ in the body. Smoking puts you and those around you at risk for developing many serious chronic diseases. Quitting smoking can be difficult, but it is one of the best things that you can do for your health. It is never too late to quit.  How do I get ready to quit?  When you decide to quit smoking, create a plan to help you succeed. Before you quit:  • Pick a date to quit. Set a date within the next 2 weeks to give you time to prepare.  • Write down the reasons why you are quitting. Keep this list in places where you will see it often.  • Tell your family, friends, and co-workers that you are quitting. Support from your loved ones can make quitting easier.  • Talk with your health care provider about your options for quitting smoking.  • Find out what treatment options are covered by your health insurance.  • Identify people, places, things, and activities that make you want to smoke (triggers). Avoid them.  What first steps can I take to quit smoking?  • Throw away all cigarettes at home, at work, and in your car.  • Throw away smoking accessories, such as ashtrays and lighters.  • Clean your car. Make sure to empty the ashtray.  • Clean your home, including curtains and carpets.  What strategies can I use to quit smoking?  Talk with your health care provider about combining strategies, such as taking medicines while you are also receiving in-person counseling. Using these two strategies together makes you more likely to succeed in quitting than if you used either strategy on its own.  • If you are pregnant or breastfeeding, talk with your health care provider about finding counseling or other support strategies to quit smoking. Do not take medicine to help you quit smoking unless your health care provider tells you to do so.  To quit  smoking:  Quit right away  • Quit smoking completely, instead of gradually reducing how much you smoke over a period of time. Research shows that stopping smoking right away is more successful than gradually quitting.  • Attend in-person counseling to help you build problem-solving skills. You are more likely to succeed in quitting if you attend counseling sessions regularly. Even short sessions of 10 minutes can be effective.  Take medicine  You may take medicines to help you quit smoking. Some medicines require a prescription and some you can purchase over-the-counter. Medicines may have nicotine in them to replace the nicotine in cigarettes. Medicines may:  • Help to stop cravings.  • Help to relieve withdrawal symptoms.  Your health care provider may recommend:  • Nicotine patches, gum, or lozenges.  • Nicotine inhalers or sprays.  • Non-nicotine medicine that is taken by mouth.  Find resources  Find resources and support systems that can help you to quit smoking and remain smoke-free after you quit. These resources are most helpful when you use them often. They include:  • Online chats with a counselor.  • Telephone quitlines.  • Printed self-help materials.  • Support groups or group counseling.  • Text messaging programs.  • Mobile phone apps or applications. Use apps that can help you stick to your quit plan by providing reminders, tips, and encouragement. There are many free apps for mobile devices as well as websites. Examples include Quit Guide from the CDC and smokefree.gov  What things can I do to make it easier to quit?    • Reach out to your family and friends for support and encouragement. Call telephone quitlines (4-800-QUIT-NOW), reach out to support groups, or work with a counselor for support.  • Ask people who smoke to avoid smoking around you.  • Avoid places that trigger you to smoke, such as bars, parties, or smoke-break areas at work.  • Spend time with people who do not smoke.  • Lessen the  stress in your life. Stress can be a smoking trigger for some people. To lessen stress, try:  ? Exercising regularly.  ? Doing deep-breathing exercises.  ? Doing yoga.  ? Meditating.  ? Performing a body scan. This involves closing your eyes, scanning your body from head to toe, and noticing which parts of your body are particularly tense. Try to relax the muscles in those areas.  How will I feel when I quit smoking?  Day 1 to 3 weeks  Within the first 24 hours of quitting smoking, you may start to feel withdrawal symptoms. These symptoms are usually most noticeable 2-3 days after quitting, but they usually do not last for more than 2-3 weeks. You may experience these symptoms:  • Mood swings.  • Restlessness, anxiety, or irritability.  • Trouble concentrating.  • Dizziness.  • Strong cravings for sugary foods and nicotine.  • Mild weight gain.  • Constipation.  • Nausea.  • Coughing or a sore throat.  • Changes in how the medicines that you take for unrelated issues work in your body.  • Depression.  • Trouble sleeping (insomnia).  Week 3 and afterward  After the first 2-3 weeks of quitting, you may start to notice more positive results, such as:  • Improved sense of smell and taste.  • Decreased coughing and sore throat.  • Slower heart rate.  • Lower blood pressure.  • Clearer skin.  • The ability to breathe more easily.  • Fewer sick days.  Quitting smoking can be very challenging. Do not get discouraged if you are not successful the first time. Some people need to make many attempts to quit before they achieve long-term success. Do your best to stick to your quit plan, and talk with your health care provider if you have any questions or concerns.  Summary  • Smoking tobacco is the leading cause of preventable death. Quitting smoking is one of the best things that you can do for your health.  • When you decide to quit smoking, create a plan to help you succeed.  • Quit smoking right away, not slowly over a  period of time.  • When you start quitting, seek help from your health care provider, family, or friends.  This information is not intended to replace advice given to you by your health care provider. Make sure you discuss any questions you have with your health care provider.  Document Revised: 08/26/2022 Document Reviewed: 03/07/2020  [employer] Patient Education © 2022 [employer]

## 2022-12-02 NOTE — PROGRESS NOTES
Patient Name: Elicia Arndt  : 1995   MRN: [id number]     Chief Complaint:    Chief Complaint   Patient presents with   • Annual Exam   • Cough     Mucus with color       History of Present Illness: Elicia Arndt is a 27 y.o. female presents to clinic for physical and evaluation of cough.      The patient has been getting over sinus congestion and is still experiencing a cough. She has tried taking Sudafed, Allegra, and Claritin. She states none of the medications have worked to improve her symptoms. She states she has been experiencing symptoms for 2 or 3 weeks. She denies soreness in her sinuses, but has been experiencing mucus and coughing. She denies wheezing or dyspnea.      She is seeing a cardiologist and her stress test was low risk. She also wore a Holter monitor that revealed some tachycardia, and rare PVCs. She denies angina, palpitations, or dyspnea. She was prescribed metoprolol at her cardiology appointment. The metoprolol has improved her symptoms. Since starting metoprolol her heart rate has not been in the 130s BPM in a long time.    Currently, the patient is taking Effexor and Wellbutrin for anxiety and depression. She states her medications are working well.      The patient's gynecologist is ECTOR Terry.      She states her sleep has been about the same, she goes to bed at 10 PM and wakes around 3 AM. She states she feels as if she is not rested in the mornings. Also, she states she feels as if she wakes gasping in the middle of the night and also snores. She has not had a sleep study.      She is still smoking, but states she has cut back a lot and is only smoking 3 cigarettes while at work. She has tried nicotine patches and gum. She also has tried taking Chantix in the past and it caused bumps in her mouth.      The patient states she will get tonsil stones often.      She has not had a recent eye exam.      Currently, she is using Nexplanon for pregnancy prevention.      The patient has a family history of colon cancer. Her paternal grandfather had colon cancer in his 30s or 40s.       Immunizations  Td/Tdap(Booster Q 10 yrs): uptodate  Flu (Yearly): uptodate  PCV 20: administered   Colorectal Screening:  N/A  Pap: gyn  Mammogram:  Discussed importance of screening for any malignancy early.   Hep C: Screen per guidelines      Diet/Physical activity: Patient is not dieting. Mild daily activity.   CT for Smoker (Age 55-75, 30pk yr):  Current smoker, the patient is working on cutting down on her smoking.   Sexual Health: following up with GYN.       Subjective     Review of System: Review of Systems   Constitutional: Positive for fatigue. Negative for fever.   HENT: Negative for congestion, ear pain, rhinorrhea and sore throat.    Respiratory: Positive for cough (mucus). Negative for shortness of breath and wheezing.    Cardiovascular: Negative for chest pain and palpitations.   Gastrointestinal: Negative for abdominal pain, diarrhea, nausea and vomiting.   Genitourinary: Negative for dysuria.   Musculoskeletal: Positive for back pain.   Skin: Negative for rash.   Neurological: Negative for headaches.   Hematological: Negative for adenopathy.   Psychiatric/Behavioral: Negative for dysphoric mood.        Medications:     Current Outpatient Medications:   •  buPROPion XL (Wellbutrin XL) 300 MG 24 hr tablet, Take 1 tablet by mouth Daily., Disp: 30 tablet, Rfl: 2  •  hydroCHLOROthiazide (HYDRODIURIL) 25 MG tablet, Take 1 tablet by mouth Daily., Disp: 30 tablet, Rfl: 3  •  hydrOXYzine (ATARAX) 25 MG tablet, Take 1/2 to 1 tablet by mouth every night for sleep/ anxiety., Disp: 30 tablet, Rfl: 2  •  losartan (Cozaar) 50 MG tablet, Take 1 tablet by mouth Daily., Disp: 30 tablet, Rfl: 3  •  methocarbamol (ROBAXIN) 750 MG tablet, Take 1 tablet by mouth 4 (Four) Times a Day As Needed for Muscle Spasms., Disp: 60 tablet, Rfl: 0  •  metoprolol succinate XL (TOPROL-XL) 25 MG 24 hr tablet, Take 1  "tablet by mouth Daily., Disp: 30 tablet, Rfl: 3  •  naproxen (Naprosyn) 500 MG tablet, Take 1 tablet by mouth 2 (Two) Times a Day With Meals. (Patient taking differently: Take 500 mg by mouth 2 (Two) Times a Day As Needed.), Disp: 60 tablet, Rfl: 0  •  venlafaxine XR (EFFEXOR-XR) 75 MG 24 hr capsule, Take 1 capsule by mouth Daily., Disp: 30 capsule, Rfl: 5  •  azithromycin (Zithromax Z-Arnaud) 250 MG tablet, Take 2 tablets by mouth on day 1, then 1 tablet daily on days 2-5, Disp: 6 tablet, Rfl: 0  •  methylPREDNISolone (MEDROL) 4 MG dose pack, Take as directed on package instructions., Disp: 21 tablet, Rfl: 0    Allergies:   Allergies   Allergen Reactions   • Adhesive Tape Hives     Red and hives         Objective     Physical Exam:   Vital Signs:   Vitals:    12/02/22 0830   BP: 122/76   BP Location: Left arm   Patient Position: Sitting   Cuff Size: Adult   Pulse: 92   Resp: 16   Temp: 98 °F (36.7 °C)   TempSrc: Temporal   SpO2: 97%   Weight: 78.5 kg (173 lb)   Height: 162 cm (63.78\")   PainSc: 0-No pain     Body mass index is 29.9 kg/m². BMI is >= 25 and <30. (Overweight) The following options were offered after discussion;: weight loss educational material (shared in after visit summary) and exercise counseling/recommendations      Physical Exam  Vitals and nursing note reviewed.   Constitutional:       General: She is not in acute distress.  HENT:      Head: Normocephalic.      Right Ear: Hearing normal.      Ears:      Comments: Right TM is dull      Nose: No septal deviation, nasal tenderness, mucosal edema, congestion or rhinorrhea.      Mouth/Throat:      Mouth: Mucous membranes are moist.      Pharynx: Oropharynx is clear. No oropharyngeal exudate or posterior oropharyngeal erythema.      Comments: Postnasal drainage  Eyes:      Extraocular Movements: Extraocular movements intact.      Conjunctiva/sclera:      Right eye: Right conjunctiva is not injected. No exudate.     Left eye: Left conjunctiva is not " injected. No exudate.     Pupils: Pupils are equal, round, and reactive to light.   Neck:      Thyroid: No thyromegaly.      Vascular: No carotid bruit.   Cardiovascular:      Rate and Rhythm: Normal rate and regular rhythm.      Heart sounds: No murmur heard.    No friction rub. No gallop.   Pulmonary:      Breath sounds: Examination of the left-upper field reveals wheezing. Wheezing present. No rhonchi or rales.   Chest:   Breasts:     Right: Normal. No swelling, bleeding, inverted nipple, mass, nipple discharge, skin change or tenderness.      Left: Normal. No swelling, bleeding, inverted nipple, mass, nipple discharge, skin change or tenderness.   Abdominal:      General: Bowel sounds are normal.      Palpations: There is no mass.      Tenderness: There is no abdominal tenderness. There is no right CVA tenderness, left CVA tenderness, guarding or rebound.      Hernia: No hernia is present.   Musculoskeletal:         General: Normal range of motion.      Cervical back: Normal range of motion.   Lymphadenopathy:      Cervical: No cervical adenopathy.      Upper Body:      Right upper body: No supraclavicular or axillary adenopathy.      Left upper body: No supraclavicular or axillary adenopathy.   Skin:     General: Skin is warm and dry.      Findings: No erythema or rash.   Neurological:      General: No focal deficit present.      Mental Status: She is alert and oriented to person, place, and time.         Assessment / Plan      Assessment/Plan:   Diagnoses and all orders for this visit:    1. Encounter for routine adult physical exam with abnormal findings (Primary)  -     Comprehensive Metabolic Panel; Future  -     TSH; Future  -     CBC & Differential; Future    2. Snores  -     Ambulatory Referral to Sleep Medicine    3. Major depressive disorder, recurrent, mild (HCC)    4. Encounter for immunization  -     Pneumococcal Conjugate Vaccine 20-Valent (PCV20)    5. Encounter for lipid screening for  cardiovascular disease  -     Lipid Panel; Future    6. Other fatigue  -     Vitamin D,25-Hydroxy; Future    7. Tobacco use    8. Wheezing  -     methylPREDNISolone (MEDROL) 4 MG dose pack; Take as directed on package instructions.  Dispense: 21 tablet; Refill: 0    9. Bronchitis  -     azithromycin (Zithromax Z-Arnaud) 250 MG tablet; Take 2 tablets by mouth on day 1, then 1 tablet daily on days 2-5  Dispense: 6 tablet; Refill: 0       Assessment and Plan  1. Physical Exam  - The patient is doing well. Routine physical exam was performed in office today. Routine labs will be obtained today.     2. Snores  - The patient snores and never wakes feeling rested. Referral will be sent for a sleep study to be obtained.     3. Depression  - She is doing well with her current medication regimen. No changes were warranted at this visit.     4. Tobacco use  - The patient has been cutting back on her smoking. Advised the patient to continue with smoking cessation efforts.   Elicia Arndt  reports that she has been smoking cigarettes. She has a 2.50 pack-year smoking history. She has never used smokeless tobacco..   I advised her to quit and she is willing to quit. We have discussed the following method/s for tobacco cessation:  Cold Trinity and bupropion.  She will follow up with me in 6 months or sooner to check on her progress.    I spent 3  minutes counseling the patient.         5. Bronchitis  - The patient has been experiencing a cough, and hoarse wheezing in the left upper lobe was noted during her exam. She was prescribed a Medrol dosepak.     Your Vitamin D was a little low; I will send vitamin d to your pharmacy to take 50,000 units (1 tablet) weekly.   Counseling:  Health Maintenance, Female  Adopting a healthy lifestyle and getting preventive care can go a long way to promote health and wellness. Talk with your health care provider about what schedule of regular examinations is right for you. This is a good chance  for you to check in with your provider about disease prevention and staying healthy.  In between checkups, there are plenty of things you can do on your own. Experts have done a lot of research about which lifestyle changes and preventive measures are most likely to keep you healthy. Ask your health care provider for more information.  Weight and diet  Eat a healthy diet  · Be sure to include plenty of vegetables, fruits, low-fat dairy products, and lean protein.  · Do not eat a lot of foods high in solid fats, added sugars, or salt.  · Get regular exercise. This is one of the most important things you can do for your health.  ? Most adults should exercise for at least 150 minutes each week. The exercise should increase your heart rate and make you sweat (moderate-intensity exercise).  ? Most adults should also do strengthening exercises at least twice a week. This is in addition to the moderate-intensity exercise.     Maintain a healthy weight  · Body mass index (BMI) is a measurement that can be used to identify possible weight problems. It estimates body fat based on height and weight. Your health care provider can help determine your BMI and help you achieve or maintain a healthy weight.  · For females 20 years of age and older:  ? A BMI below 18.5 is considered underweight.  ? A BMI of 18.5 to 24.9 is normal.  ? A BMI of 25 to 29.9 is considered overweight.  ? A BMI of 30 and above is considered obese.     Watch levels of cholesterol and blood lipids  · You should start having your blood tested for lipids and cholesterol at 20 years of age, then have this test every 5 years.  · You may need to have your cholesterol levels checked more often if:  ? Your lipid or cholesterol levels are high.  ? You are older than 50 years of age.  ? You are at high risk for heart disease.     Cancer screening  Lung Cancer  · Lung cancer screening is recommended for adults 55-80 years old who are at high risk for lung cancer  because of a history of smoking.  · A yearly low-dose CT scan of the lungs is recommended for people who:  ? Currently smoke.  ? Have quit within the past 15 years.  ? Have at least a 30-pack-year history of smoking. A pack year is smoking an average of one pack of cigarettes a day for 1 year.  · Yearly screening should continue until it has been 15 years since you quit.  · Yearly screening should stop if you develop a health problem that would prevent you from having lung cancer treatment.     Breast Cancer  · Practice breast self-awareness. This means understanding how your breasts normally appear and feel.  · It also means doing regular breast self-exams. Let your health care provider know about any changes, no matter how small.  · If you are in your 20s or 30s, you should have a clinical breast exam (CBE) by a health care provider every 1-3 years as part of a regular health exam.  · If you are 40 or older, have a CBE every year. Also consider having a breast X-ray (mammogram) every year.  · If you have a family history of breast cancer, talk to your health care provider about genetic screening.  · If you are at high risk for breast cancer, talk to your health care provider about having an MRI and a mammogram every year.  · Breast cancer gene (BRCA) assessment is recommended for women who have family members with BRCA-related cancers. BRCA-related cancers include:  ? Breast.  ? Ovarian.  ? Tubal.  ? Peritoneal cancers.  · Results of the assessment will determine the need for genetic counseling and BRCA1 and BRCA2 testing.     Cervical Cancer  Your health care provider may recommend that you be screened regularly for cancer of the pelvic organs (ovaries, uterus, and vagina). This screening involves a pelvic examination, including checking for microscopic changes to the surface of your cervix (Pap test). You may be encouraged to have this screening done every 3 years, beginning at age 21.  · For women ages 30-65,  health care providers may recommend pelvic exams and Pap testing every 3 years, or they may recommend the Pap and pelvic exam, combined with testing for human papilloma virus (HPV), every 5 years. Some types of HPV increase your risk of cervical cancer. Testing for HPV may also be done on women of any age with unclear Pap test results.  · Other health care providers may not recommend any screening for nonpregnant women who are considered low risk for pelvic cancer and who do not have symptoms. Ask your health care provider if a screening pelvic exam is right for you.  · If you have had past treatment for cervical cancer or a condition that could lead to cancer, you need Pap tests and screening for cancer for at least 20 years after your treatment. If Pap tests have been discontinued, your risk factors (such as having a new sexual partner) need to be reassessed to determine if screening should resume. Some women have medical problems that increase the chance of getting cervical cancer. In these cases, your health care provider may recommend more frequent screening and Pap tests.     Colorectal Cancer  · This type of cancer can be detected and often prevented.  · Routine colorectal cancer screening usually begins at 50 years of age and continues through 75 years of age.  · Your health care provider may recommend screening at an earlier age if you have risk factors for colon cancer.  · Your health care provider may also recommend using home test kits to check for hidden blood in the stool.  · A small camera at the end of a tube can be used to examine your colon directly (sigmoidoscopy or colonoscopy). This is done to check for the earliest forms of colorectal cancer.  · Routine screening usually begins at age 50.  · Direct examination of the colon should be repeated every 5-10 years through 75 years of age. However, you may need to be screened more often if early forms of precancerous polyps or small growths are  found.     Skin Cancer  · Check your skin from head to toe regularly.  · Tell your health care provider about any new moles or changes in moles, especially if there is a change in a mole's shape or color.  · Also tell your health care provider if you have a mole that is larger than the size of a pencil eraser.  · Always use sunscreen. Apply sunscreen liberally and repeatedly throughout the day.  · Protect yourself by wearing long sleeves, pants, a wide-brimmed hat, and sunglasses whenever you are outside.     Heart disease, diabetes, and high blood pressure  · High blood pressure causes heart disease and increases the risk of stroke. High blood pressure is more likely to develop in:  ? People who have blood pressure in the high end of the normal range (130-139/85-89 mm Hg).  ? People who are overweight or obese.  ? People who are .  · If you are 18-39 years of age, have your blood pressure checked every 3-5 years. If you are 40 years of age or older, have your blood pressure checked every year. You should have your blood pressure measured twice--once when you are at a hospital or clinic, and once when you are not at a hospital or clinic. Record the average of the two measurements. To check your blood pressure when you are not at a hospital or clinic, you can use:  ? An automated blood pressure machine at a pharmacy.  ? A home blood pressure monitor.  · If you are between 55 years and 79 years old, ask your health care provider if you should take aspirin to prevent strokes.  · Have regular diabetes screenings. This involves taking a blood sample to check your fasting blood sugar level.  ? If you are at a normal weight and have a low risk for diabetes, have this test once every three years after 45 years of age.  ? If you are overweight and have a high risk for diabetes, consider being tested at a younger age or more often.  Preventing infection  Hepatitis B  · If you have a higher risk for hepatitis  B, you should be screened for this virus. You are considered at high risk for hepatitis B if:  ? You were born in a country where hepatitis B is common. Ask your health care provider which countries are considered high risk.  ? Your parents were born in a high-risk country, and you have not been immunized against hepatitis B (hepatitis B vaccine).  ? You have HIV or AIDS.  ? You use needles to inject street drugs.  ? You live with someone who has hepatitis B.  ? You have had sex with someone who has hepatitis B.  ? You get hemodialysis treatment.  ? You take certain medicines for conditions, including cancer, organ transplantation, and autoimmune conditions.     Hepatitis C  · Blood testing is recommended for:  ? Everyone born from 1945 through 1965.  ? Anyone with known risk factors for hepatitis C.     Sexually transmitted infections (STIs)  · You should be screened for sexually transmitted infections (STIs) including gonorrhea and chlamydia if:  ? You are sexually active and are younger than 24 years of age.  ? You are older than 24 years of age and your health care provider tells you that you are at risk for this type of infection.  ? Your sexual activity has changed since you were last screened and you are at an increased risk for chlamydia or gonorrhea. Ask your health care provider if you are at risk.  · If you do not have HIV, but are at risk, it may be recommended that you take a prescription medicine daily to prevent HIV infection. This is called pre-exposure prophylaxis (PrEP). You are considered at risk if:  ? You are sexually active and do not regularly use condoms or know the HIV status of your partner(s).  ? You take drugs by injection.  ? You are sexually active with a partner who has HIV.     Talk with your health care provider about whether you are at high risk of being infected with HIV. If you choose to begin PrEP, you should first be tested for HIV. You should then be tested every 3 months for  as long as you are taking PrEP.  Pregnancy  · If you are premenopausal and you may become pregnant, ask your health care provider about preconception counseling.  · If you may become pregnant, take 400 to 800 micrograms (mcg) of folic acid every day.  · If you want to prevent pregnancy, talk to your health care provider about birth control (contraception).  Osteoporosis and menopause  · Osteoporosis is a disease in which the bones lose minerals and strength with aging. This can result in serious bone fractures. Your risk for osteoporosis can be identified using a bone density scan.  · If you are 65 years of age or older, or if you are at risk for osteoporosis and fractures, ask your health care provider if you should be screened.  · Ask your health care provider whether you should take a calcium or vitamin D supplement to lower your risk for osteoporosis.  · Menopause may have certain physical symptoms and risks.  · Hormone replacement therapy may reduce some of these symptoms and risks.  Talk to your health care provider about whether hormone replacement therapy is right for you.  Follow these instructions at home:  · Schedule regular health, dental, and eye exams.  · Stay current with your immunizations.  · Do not use any tobacco products including cigarettes, chewing tobacco, or electronic cigarettes.  · If you are pregnant, do not drink alcohol.  · If you are breastfeeding, limit how much and how often you drink alcohol.  · Limit alcohol intake to no more than 1 drink per day for nonpregnant women. One drink equals 12 ounces of beer, 5 ounces of wine, or 1½ ounces of hard liquor.  · Do not use street drugs.  · Do not share needles.  · Ask your health care provider for help if you need support or information about quitting drugs.  · Tell your health care provider if you often feel depressed.  · Tell your health care provider if you have ever been abused or do not feel safe at home.  This information is not  intended to replace advice given to you by your health care provider. Make sure you discuss any questions you have with your health care provider.  Document Released: 07/02/2012 Document Revised: 05/25/2017 Document Reviewed: 09/20/2016  [employer] Interactive Patient Education © 2018 "[employer]    Follow Up:   Return in about 6 months (around 6/2/2023) for Recheck.    ECTOR Alvarado  [facility]    Transcribed from ambient dictation for ECTOR Alvarado by Shannon [employer] .  12/02/22   14:40 EST    Patient or patient representative verbalized consent to the visit recording.  I have personally performed the services described in this document as transcribed by the above individual, and it is both accurate and complete.

## 2022-12-15 DIAGNOSIS — F41.1 GENERALIZED ANXIETY DISORDER WITH PANIC ATTACKS: ICD-10-CM

## 2022-12-15 DIAGNOSIS — F32.9 MAJOR DEPRESSIVE DISORDER, REMISSION STATUS UNSPECIFIED, UNSPECIFIED WHETHER RECURRENT: ICD-10-CM

## 2022-12-15 DIAGNOSIS — F41.0 GENERALIZED ANXIETY DISORDER WITH PANIC ATTACKS: ICD-10-CM

## 2022-12-15 RX ORDER — VENLAFAXINE HYDROCHLORIDE 75 MG/1
75 CAPSULE, EXTENDED RELEASE ORAL DAILY
Qty: 30 CAPSULE | Refills: 5 | Status: SHIPPED | OUTPATIENT
Start: 2022-12-15

## 2022-12-15 NOTE — TELEPHONE ENCOUNTER
Caller: Elicia Arndt    Relationship: Self    Best call back number: [phone]    Requested Prescriptions:   Requested Prescriptions     Pending Prescriptions Disp Refills   • venlafaxine XR (EFFEXOR-XR) 75 MG 24 hr capsule 30 capsule 5     Sig: Take 1 capsule by mouth Daily.        Pharmacy where request should be sent: [facility] [id number] [address] [phone] [facility] [phone] FX     Additional details provided by patient: PATIENT IS OUT     Does the patient have less than a 3 day supply:  [x] Yes  [] No    Would you like a call back once the refill request has been completed: [x] Yes [] No    If the office needs to give you a call back, can they leave a voicemail: [x] Yes [] No    Cadance Dunaway, RegSched Rep   12/15/22 09:37 EST

## 2023-01-20 RX ORDER — METOPROLOL SUCCINATE 25 MG/1
25 TABLET, EXTENDED RELEASE ORAL DAILY
Qty: 30 TABLET | Refills: 3 | Status: SHIPPED | OUTPATIENT
Start: 2023-01-20

## 2023-01-20 NOTE — TELEPHONE ENCOUNTER
Passes BB protocol, last seen 11/16, follow-up scheduled for 5/17. 30 day refill sent to [facility] in [place] with 3 refills.

## 2023-02-10 DIAGNOSIS — F33.9 RECURRENT MAJOR DEPRESSIVE DISORDER, REMISSION STATUS UNSPECIFIED: ICD-10-CM

## 2023-02-10 RX ORDER — BUPROPION HYDROCHLORIDE 300 MG/1
TABLET ORAL
Qty: 30 TABLET | Refills: 2 | Status: SHIPPED | OUTPATIENT
Start: 2023-02-10

## 2023-03-23 RX ORDER — LOSARTAN POTASSIUM 50 MG/1
TABLET ORAL
Qty: 30 TABLET | Refills: 3 | Status: SHIPPED | OUTPATIENT
Start: 2023-03-23

## 2023-04-03 RX ORDER — HYDROCHLOROTHIAZIDE 25 MG/1
TABLET ORAL
Qty: 30 TABLET | Refills: 3 | Status: SHIPPED | OUTPATIENT
Start: 2023-04-03

## 2023-08-09 RX ORDER — METOPROLOL SUCCINATE 25 MG/1
25 TABLET, EXTENDED RELEASE ORAL DAILY
Qty: 30 TABLET | Refills: 0 | OUTPATIENT
Start: 2023-08-09

## 2023-08-09 RX ORDER — METOPROLOL SUCCINATE 25 MG/1
25 TABLET, EXTENDED RELEASE ORAL DAILY
Qty: 30 TABLET | Refills: 0 | Status: SHIPPED | OUTPATIENT
Start: 2023-08-09

## 2023-08-18 DIAGNOSIS — F33.9 RECURRENT MAJOR DEPRESSIVE DISORDER, REMISSION STATUS UNSPECIFIED: Primary | ICD-10-CM

## 2023-08-30 ENCOUNTER — TELEMEDICINE (OUTPATIENT)
Dept: PSYCHIATRY | Facility: CLINIC | Age: 28
End: 2023-08-30
Payer: COMMERCIAL

## 2023-08-30 DIAGNOSIS — R41.840 CONCENTRATION DEFICIT: Primary | ICD-10-CM

## 2023-08-30 DIAGNOSIS — F43.29 ADJUSTMENT DISORDER WITH OTHER SYMPTOM: ICD-10-CM
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
## 2023-09-08 ENCOUNTER — OFFICE VISIT (OUTPATIENT)
Dept: INTERNAL MEDICINE | Facility: CLINIC | Age: 28
End: 2023-09-08
Payer: COMMERCIAL

## 2023-09-08 ENCOUNTER — LAB (OUTPATIENT)
Dept: LAB | Facility: HOSPITAL | Age: 28
End: 2023-09-08
Payer: COMMERCIAL

## 2023-09-08 VITALS
HEIGHT: 64 IN | DIASTOLIC BLOOD PRESSURE: 82 MMHG | WEIGHT: 188 LBS | HEART RATE: 93 BPM | RESPIRATION RATE: 18 BRPM | SYSTOLIC BLOOD PRESSURE: 122 MMHG | BODY MASS INDEX: 32.1 KG/M2 | TEMPERATURE: 97.3 F

## 2023-09-08 DIAGNOSIS — Z51.81 ENCOUNTER FOR MEDICATION MONITORING: ICD-10-CM

## 2023-09-08 DIAGNOSIS — F43.29 ADJUSTMENT REACTION WITH PHYSICAL SYMPTOMS: ICD-10-CM

## 2023-09-08 DIAGNOSIS — I10 HYPERTENSION, UNSPECIFIED TYPE: Primary | ICD-10-CM

## 2023-09-08 DIAGNOSIS — R41.840 ATTENTION DEFICIT: Primary | ICD-10-CM

## 2023-09-08 DIAGNOSIS — F43.29 ADJUSTMENT DISORDER WITH OTHER SYMPTOM: ICD-10-CM

## 2023-09-08 DIAGNOSIS — F33.9 RECURRENT MAJOR DEPRESSIVE DISORDER, REMISSION STATUS UNSPECIFIED: ICD-10-CM

## 2023-09-08 LAB
AMPHET+METHAMPHET UR QL: NEGATIVE
AMPHETAMINES UR QL: NEGATIVE
BARBITURATES UR QL SCN: NEGATIVE
BENZODIAZ UR QL SCN: NEGATIVE
BUPRENORPHINE SERPL-MCNC: NEGATIVE NG/ML
CANNABINOIDS SERPL QL: NEGATIVE
COCAINE UR QL: NEGATIVE
FENTANYL UR-MCNC: NEGATIVE NG/ML
FOLATE SERPL-MCNC: >20 NG/ML (ref 4.78–24.2)
IRON 24H UR-MRATE: 123 MCG/DL (ref 37–145)
MAGNESIUM SERPL-MCNC: 2.1 MG/DL (ref 1.6–2.6)
METHADONE UR QL SCN: NEGATIVE
OPIATES UR QL: NEGATIVE
OXYCODONE UR QL SCN: NEGATIVE
PCP UR QL SCN: NEGATIVE
PROPOXYPH UR QL: NEGATIVE
TRICYCLICS UR QL SCN: NEGATIVE
VIT B12 BLD-MCNC: 499 PG/ML (ref 211–946)

## 2023-09-08 PROCEDURE — 83921 ORGANIC ACID SINGLE QUANT: CPT

## 2023-09-08 PROCEDURE — 36415 COLL VENOUS BLD VENIPUNCTURE: CPT

## 2023-09-08 PROCEDURE — 82746 ASSAY OF FOLIC ACID SERUM: CPT

## 2023-09-08 PROCEDURE — 80307 DRUG TEST PRSMV CHEM ANLYZR: CPT

## 2023-09-08 PROCEDURE — 83735 ASSAY OF MAGNESIUM: CPT

## 2023-09-08 PROCEDURE — 83540 ASSAY OF IRON: CPT

## 2023-09-08 PROCEDURE — 82607 VITAMIN B-12: CPT

## 2023-09-08 RX ORDER — BUPROPION HYDROCHLORIDE 150 MG/1
150 TABLET, EXTENDED RELEASE ORAL DAILY
COMMUNITY

## 2023-09-08 NOTE — PROGRESS NOTES
: 1995   MRN: [id number]     Chief Complaint:    Chief Complaint   Patient presents with    Depression     2 month follow up.        History of Present Illness: Elicia Arndt is a 28 y.o. female presents to clinic. They present to clinic for 2 month follow-up of depression.    She reports that her behavioral health provider increased her Vraylar to 3 mg for concern for mood disorder; she is still taking bupropion 150 mg and Effexor. The medication has been working and her anxiety/depression symptoms have improved. She has always had issues with sleep. She has an extensive family history of ADHD and was referred to psychological testing for ADHD and has that scheduled.  If stimulant therapy is initiated, behavioral health history of requesting cardiac clearance by PCP.    Hypertension  Patient has been monitoring her blood pressure at home, readings are 120/80 to 138/80 mmHg. Stable on losartan 50 mg, HCTZ 25 mg and metoprolol daily.  All her cardiac work-ups have been normal. Metoprolol has improved her palpitations.  She denies any palpitations or shortness of breath.  She was last seen by cardiology 2022.  They recommended a 6-month follow-up.  She did not follow-up.    Family history  Sister recently diagnosed with MADDEN, stage II.    Subjective     Review of System: Review of Systems   A review of systems was performed, and the pertinent positives are noted in the HPI.    Medications:     Current Outpatient Medications:     buPROPion SR (WELLBUTRIN SR) 150 MG 12 hr tablet, Take 1 tablet by mouth Daily., Disp: , Rfl:     Cariprazine HCl (Vraylar) 3 MG capsule capsule, Take 1 capsule by mouth Daily for 30 days., Disp: 30 capsule, Rfl: 0    hydroCHLOROthiazide (HYDRODIURIL) 25 MG tablet, TAKE ONE TABLET BY MOUTH DAILY, Disp: 30 tablet, Rfl: 1    hydrOXYzine (ATARAX) 25 MG tablet, Take 1/2 to 1 tablet by mouth every night for sleep/ anxiety., Disp: 30 tablet, Rfl: 2    losartan (COZAAR) 50 MG  "tablet, TAKE ONE TABLET BY MOUTH DAILY, Disp: 30 tablet, Rfl: 1    methocarbamol (ROBAXIN) 750 MG tablet, Take 1 tablet by mouth 4 (Four) Times a Day As Needed for Muscle Spasms., Disp: 60 tablet, Rfl: 0    metoprolol succinate XL (TOPROL-XL) 25 MG 24 hr tablet, Take 1 tablet by mouth Daily., Disp: 30 tablet, Rfl: 0    naproxen (Naprosyn) 500 MG tablet, Take 1 tablet by mouth 2 (Two) Times a Day With Meals. (Patient taking differently: Take 1 tablet by mouth 2 (Two) Times a Day As Needed.), Disp: 60 tablet, Rfl: 0    venlafaxine XR (EFFEXOR-XR) 75 MG 24 hr capsule, TAKE ONE CAPSULE BY MOUTH DAILY, Disp: 30 capsule, Rfl: 3    Allergies:   Allergies   Allergen Reactions    Adhesive Tape Hives     Red and hives         Objective     Physical Exam:   Vital Signs:   Vitals:    09/08/23 0858   BP: 122/82   BP Location: Right arm   Patient Position: Sitting   Cuff Size: Adult   Pulse: 93   Resp: 18   Temp: 97.3 °F (36.3 °C)   TempSrc: Infrared   Weight: 85.3 kg (188 lb)   Height: 161.9 cm (63.75\")   PainSc: 0-No pain           Physical Exam  Constitutional:       General: She is not in acute distress.     Appearance: She is not ill-appearing.   HENT:      Head: Normocephalic.   Cardiovascular:      Rate and Rhythm: Normal rate and regular rhythm.      Heart sounds: Normal heart sounds. No murmur heard.  Pulmonary:      Breath sounds: Normal breath sounds.   Neurological:      General: No focal deficit present.      Mental Status: She is oriented to person, place, and time.   Psychiatric:         Mood and Affect: Mood normal.       ECG 12 Lead    Date/Time: 9/11/2023 5:59 PM  Performed by: Arina Venegas APRN  Authorized by: Arina Venegas APRN   Comparison: compared with previous ECG from 8/25/2022  Comparison to previous ECG: SR replaces ST  Rhythm: sinus rhythm  Rate: normal  Conduction: conduction normal  QRS axis: normal    Clinical impression: normal ECG        Assessment / Plan      Assessment/Plan: "   Diagnoses and all orders for this visit:    1. Hypertension, unspecified type (Primary)    2. Encounter for medication monitoring  -     ECG 12 Lead    3. Recurrent major depressive disorder, remission status unspecified         Depression.   Message sent to Asher BARNEY concerning patient's medication.  She indicated that she did not let her behavioral health provider know that she is still taking bupropion 150 mg daily in addition to Vraylar and Effexor.  She is tolerating it at this time. Advised patient to discuss with behavioral health provider and to monitor her symptoms for serotonin syndrome. Defer psychiatric medication changes to behavioral health. Discussed possible red flags and how to follow-up with those. Discussed possible side effects or adverse reactions of anti-depressants.  She has a follow-up next week with behavioral health.      2. Hypertension  Blood pressure controlled on current medication regime. Will obtain EKG today as she is considering stimulant therapy.  She has not followed up with cardiology. If bp and HR/palpitations controlled, stimulant therapy appears to be appropriate.   Adult Transthoracic Echo Complete W/ Cont if Necessary Per Protocol (09/09/2022 16:23)   Left ventricular ejection fraction is normal no valvular disease.  Stress test 9/28/2022 consistent with low risk study.  Holter Monitor 10/10/2022 showed sinus rhythm to sinus tach with 3 PVCs no PACs.  Follow Up:   She will follow-up after ADHD if stimulant therapy needed.     ECTOR Alvarado  [facility] [place] [facility]    Transcribed from ambient dictation for ECTOR Alvarado by Demi Horvath.  09/08/23   11:22 EDT    Patient or patient representative verbalized consent to the visit recording.  I have personally performed the services described in this document as transcribed by the above individual, and it is both accurate and complete.

## 2023-09-12 LAB — METHYLMALONATE SERPL-SCNC: 120 NMOL/L (ref 0–378)

## 2023-09-13 ENCOUNTER — TELEMEDICINE (OUTPATIENT)
Dept: PSYCHIATRY | Facility: CLINIC | Age: 28
End: 2023-09-13
Payer: COMMERCIAL

## 2023-09-13 DIAGNOSIS — F33.2 SEVERE EPISODE OF RECURRENT MAJOR DEPRESSIVE DISORDER, WITHOUT PSYCHOTIC FEATURES: Primary | ICD-10-CM

## 2023-09-13 DIAGNOSIS — F41.1 GENERALIZED ANXIETY DISORDER: ICD-10-CM
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
## 2023-09-17 DIAGNOSIS — F41.0 GENERALIZED ANXIETY DISORDER WITH PANIC ATTACKS: ICD-10-CM

## 2023-09-17 DIAGNOSIS — F32.9 MAJOR DEPRESSIVE DISORDER, REMISSION STATUS UNSPECIFIED, UNSPECIFIED WHETHER RECURRENT: ICD-10-CM

## 2023-09-17 DIAGNOSIS — F41.1 GENERALIZED ANXIETY DISORDER WITH PANIC ATTACKS: ICD-10-CM

## 2023-09-18 RX ORDER — BUPROPION HYDROCHLORIDE 300 MG/1
TABLET ORAL
Qty: 90 TABLET | Refills: 1 | Status: SHIPPED | OUTPATIENT
Start: 2023-09-18 | End: 2023-09-20 | Stop reason: DRUGHIGH

## 2023-09-18 RX ORDER — VENLAFAXINE HYDROCHLORIDE 75 MG/1
CAPSULE, EXTENDED RELEASE ORAL
Qty: 90 CAPSULE | Refills: 1 | Status: SHIPPED | OUTPATIENT
Start: 2023-09-18 | End: 2023-09-20

## 2023-09-20 ENCOUNTER — TELEMEDICINE (OUTPATIENT)
Dept: PSYCHIATRY | Facility: CLINIC | Age: 28
End: 2023-09-20
Payer: COMMERCIAL

## 2023-09-20 DIAGNOSIS — F33.2 SEVERE EPISODE OF RECURRENT MAJOR DEPRESSIVE DISORDER, WITHOUT PSYCHOTIC FEATURES: ICD-10-CM

## 2023-09-20 DIAGNOSIS — F41.1 GENERALIZED ANXIETY DISORDER: Primary | ICD-10-CM

## 2023-09-20 DIAGNOSIS — R41.840 CONCENTRATION DEFICIT: ICD-10-CM

## 2023-09-20 RX ORDER — BUPROPION HYDROCHLORIDE 450 MG/1
450 TABLET, FILM COATED, EXTENDED RELEASE ORAL DAILY
Qty: 30 TABLET | Refills: 1 | Status: SHIPPED | OUTPATIENT
Start: 2023-09-20 | End: 2023-11-19

## 2023-09-20 RX ORDER — VENLAFAXINE 37.5 MG/1
TABLET ORAL
Qty: 11 TABLET | Refills: 0 | Status: SHIPPED | OUTPATIENT
Start: 2023-09-20 | End: 2023-10-01
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
## 2023-10-03 RX ORDER — METOPROLOL SUCCINATE 25 MG/1
25 TABLET, EXTENDED RELEASE ORAL DAILY
Qty: 30 TABLET | Refills: 0 | Status: SHIPPED | OUTPATIENT
Start: 2023-10-03

## 2023-10-11 ENCOUNTER — OFFICE VISIT (OUTPATIENT)
Dept: INTERNAL MEDICINE | Facility: CLINIC | Age: 28
End: 2023-10-11
Payer: COMMERCIAL

## 2023-10-11 VITALS
WEIGHT: 190.38 LBS | HEART RATE: 74 BPM | TEMPERATURE: 98.2 F | BODY MASS INDEX: 32.93 KG/M2 | DIASTOLIC BLOOD PRESSURE: 76 MMHG | RESPIRATION RATE: 16 BRPM | SYSTOLIC BLOOD PRESSURE: 118 MMHG

## 2023-10-11 DIAGNOSIS — J06.9 UPPER RESPIRATORY TRACT INFECTION, UNSPECIFIED TYPE: Primary | ICD-10-CM

## 2023-10-11 DIAGNOSIS — R05.9 COUGH, UNSPECIFIED TYPE: ICD-10-CM

## 2023-10-11 DIAGNOSIS — J02.9 SORE THROAT: ICD-10-CM

## 2023-10-11 LAB
EXPIRATION DATE: NORMAL
EXPIRATION DATE: NORMAL
FLUAV AG UPPER RESP QL IA.RAPID: NOT DETECTED
FLUBV AG UPPER RESP QL IA.RAPID: NOT DETECTED
INTERNAL CONTROL: NORMAL
INTERNAL CONTROL: NORMAL
Lab: NORMAL
Lab: NORMAL
S PYO AG THROAT QL: NEGATIVE
SARS-COV-2 AG UPPER RESP QL IA.RAPID: NOT DETECTED

## 2023-10-11 PROCEDURE — 87880 STREP A ASSAY W/OPTIC: CPT | Performed by: INTERNAL MEDICINE

## 2023-10-11 PROCEDURE — 99214 OFFICE O/P EST MOD 30 MIN: CPT | Performed by: INTERNAL MEDICINE

## 2023-10-11 RX ORDER — AMOXICILLIN 875 MG/1
875 TABLET, COATED ORAL 2 TIMES DAILY
Qty: 20 TABLET | Refills: 0 | Status: SHIPPED | OUTPATIENT
Start: 2023-10-11 | End: 2023-10-21

## 2023-10-11 NOTE — PROGRESS NOTES
Subjective       Elicia Arndt is a 28 y.o. female.     Chief Complaint   Patient presents with    Headache    Earache    Cough    Sore Throat       History obtained from the patient.      URI   This is a new problem. Episode onset: 4 days ago. The problem has been gradually worsening. There has been no fever. Associated symptoms include congestion (head and chest), coughing (wet, productive of yellow sputum), ear pain (bilateral), headaches, nausea (yesterday), a plugged ear sensation, rhinorrhea (cloudy), sinus pain, sneezing and a sore throat. Pertinent negatives include no abdominal pain, chest pain, diarrhea, joint pain, joint swelling, neck pain, rash, swollen glands, vomiting or wheezing. Associated symptoms comments: No loss of taste or smell. Treatments tried: Dayquil/Nyquil and Sudafed. The treatment provided no relief.      She is a smoker.  The patient has been exposed to a daughter with strep.  There is no known exposure to Influenza, COVID-19, or RSV.    The following portions of the patient's history were reviewed and updated as appropriate: allergies, current medications, past family history, past medical history, past social history, past surgical history, and problem list.      Review of Systems   Constitutional:  Negative for appetite change, chills, fatigue and fever.   HENT:  Positive for congestion (head and chest), ear pain (bilateral), postnasal drip, rhinorrhea (cloudy), sinus pressure, sinus pain, sneezing and sore throat. Negative for ear discharge.    Eyes:  Negative for discharge and redness.   Respiratory:  Positive for cough (wet, productive of yellow sputum). Negative for chest tightness, shortness of breath and wheezing.    Cardiovascular:  Negative for chest pain.   Gastrointestinal:  Positive for nausea (yesterday). Negative for abdominal pain, diarrhea and vomiting.   Musculoskeletal:  Negative for arthralgias, joint pain, joint swelling, myalgias, neck pain and neck  stiffness.   Skin:  Negative for rash.   Neurological:  Positive for headaches.   Hematological:  Negative for adenopathy.           Objective     Blood pressure 118/76, pulse 74, temperature 98.2 °F (36.8 °C), temperature source Infrared, resp. rate 16, weight 86.4 kg (190 lb 6 oz), not currently breastfeeding.    Physical Exam  Vitals and nursing note reviewed.   Constitutional:       Appearance: She is well-developed.      Comments: BMI greater than 30.   HENT:      Head: Normocephalic and atraumatic.      Comments: There is bilateral maxillary and frontal sinus tenderness to palpation.     Right Ear: Tympanic membrane, ear canal and external ear normal.      Left Ear: Tympanic membrane, ear canal and external ear normal.      Mouth/Throat:      Mouth: Mucous membranes are moist. No oral lesions.      Pharynx: Oropharynx is clear.      Comments: Tonsils normal.  Eyes:      Conjunctiva/sclera: Conjunctivae normal.   Cardiovascular:      Rate and Rhythm: Normal rate and regular rhythm.      Heart sounds: Normal heart sounds. No murmur heard.  Pulmonary:      Effort: Pulmonary effort is normal.      Breath sounds: Normal breath sounds.   Musculoskeletal:      Cervical back: Normal range of motion and neck supple.   Lymphadenopathy:      Cervical: No cervical adenopathy (there is mild tenderness to palpation in the bilateral anterior, but not posterior, cervical area).   Skin:     Findings: No rash.   Neurological:      Mental Status: She is alert.   Psychiatric:         Mood and Affect: Mood normal.       Results for orders placed or performed in visit on 10/11/23   POCT SARS-CoV-2 Antigen MACHO    Specimen: Swab   Result Value Ref Range    SARS Antigen Not Detected Not Detected, Presumptive Negative    Influenza A Antigen MACHO Not Detected Not Detected    Influenza B Antigen MACHO Not Detected Not Detected    Internal Control Passed Passed    Lot Number [id number]     Expiration Date 10/27/24    POC Rapid Strep A     Specimen: Swab   Result Value Ref Range    Rapid Strep A Screen Negative Negative, VALID, INVALID, Not Performed    Internal Control Passed Passed    Lot Number #[id number]     Expiration Date 11/21/24          Assessment & Plan   Diagnoses and all orders for this visit:    1. Upper respiratory tract infection, unspecified type (Primary)  -     amoxicillin (AMOXIL) 875 MG tablet; Take 1 tablet by mouth 2 (Two) Times a Day for 10 days.  Dispense: 20 tablet; Refill: 0   Continue current over the counter medication, add Mucinex, and plenty of fluids.    2. Cough, unspecified type  -     POCT SARS-CoV-2 Antigen MACHO    3. Sore throat  -     POC Rapid Strep A        Return if symptoms worsen or fail to improve.

## 2023-10-18 ENCOUNTER — TELEMEDICINE (OUTPATIENT)
Dept: PSYCHIATRY | Facility: CLINIC | Age: 28
End: 2023-10-18
Payer: COMMERCIAL

## 2023-10-18 DIAGNOSIS — F33.2 SEVERE EPISODE OF RECURRENT MAJOR DEPRESSIVE DISORDER, WITHOUT PSYCHOTIC FEATURES: Primary | ICD-10-CM

## 2023-10-18 DIAGNOSIS — F41.1 GENERALIZED ANXIETY DISORDER: ICD-10-CM

## 2023-10-18 NOTE — PROGRESS NOTES
"[facility]   Follow-up Progress Note     Date: October 18, 2023  Time In: 9:04  Time Out: 9:26      PROGRESS NOTE  Data:  Elicia Arndt is a 28 y.o. female presenting to [facility] (through [facility]), [address] [place], [address] using a secure MyChart Video Visit through [facility] for assessment with Chas Ramirez LCSW. The patient is seen remotely in their  car  using [facility] My Chart. Patient is being seen via telehealth and stated they are in a secure environment for this session. The patient's condition being diagnosed/treated is appropriate for telemedicine. The provider identified herself as well as her credentials. The patient and/or patients guardian consent to be seen remotely, and when consent is given they understand that the consent allows for patient identifiable information to be sent to a third party as needed. They may refuse to be seen remotely at any time. The electronic data is encrypted and password protected, and the patient has been advised of the potential risks to privacy not withstanding such measures.    Today Patient expressed her medication has increased since the previous appointment. Patient expressed she has felt more emotional. Patient expressed she does have \"a lot going on\" trying to manage her own family, school, caring for her mother, and work. Patient reported that balancing her schedule is challenging and can cause her to feel overwhelmed. Patient expressed that she would like to have more control over her emotions. Patient stated she would also like to build skills in communicating her emotions effectively. Patient expressed that she is feeling anxious about attending another appointment to complete testing. Patient expressed increase anxiety attending in office appointments. Processed with patient her thoughts and emotions. Discussed with patient using " journaling as a coping strategy to manage increased anxiety.       Clinical Maneuvering/Intervention:    Chief Complaint: anxiety, depression    (Scales based on 0 - 10 with 10 being the worst)  Depression: 10 Anxiety: 10     Assisted Patient in processing above session content; acknowledged and normalized patient’s thoughts, feelings, and concerns.  Rationalized patient thought process regarding identifying treatment goals.  Discussed triggers associated with patient's depression.  Also discussed coping skills for patient to implement such as journaling.    Allowed Patient to freely discuss issues  without interruption or judgement with unconditional positive regard, active listening skills, and empathy. Therapist provided a safe, confidential environment to facilitate the development of a positive therapeutic relationship and encouraged open, honest communication. Assisted Patient in identifying risk factors which would indicate the need for higher level of care including thoughts to harm self or others and/or self-harming behavior and encouraged Patient to contact this office, call 911, or present to the nearest emergency room should any of these events occur. Discussed crisis intervention services and means to access. Patient adamantly and convincingly denies current suicidal or homicidal ideation or perceptual disturbance. Assisted Patient in processing session content; acknowledged and normalized Patient’s thoughts, feelings, and concerns by utilizing a person-centered approach in efforts to build appropriate rapport and a positive therapeutic relationship with open and honest communication. Therapist utilized dialectical behavior techniques to teach and model emotional regulation and relaxation methods. Therapist assisted Patient with identifying and implementing healthier coping strategies.     Assessment   Patient appears to be experiencing heightened anxiety and depression in response to COVID-19 epidemic   As a result, they can be reasonably expected to continue to benefit from treatment and would likely be at increased risk for decompensation otherwise.    Mental Status Exam:   Hygiene:   good  Cooperation:  Cooperative  Eye Contact:  Good  Psychomotor Behavior:  Appropriate  Affect:  Full range  Mood: anxious  Speech:  Normal  Thought Process:  Goal directed  Thought Content:  Mood congruent  Suicidal:  None  Homicidal:  None  Hallucinations:  None  Delusion:  None  Memory:  Deficits  Orientation:  Person, Place, Time, and Situation  Reliability:  good  Insight:  Good  Judgement:  Good  Impulse Control:  Good  Physical/Medical Issues:  No      PHQ-Score Total:  PHQ-9 Total Score:        Patient's Support Network Includes:   and mother    Functional Status: Moderate impairment     Progress toward goal: Not at goal    Prognosis: Good with Ongoing Treatment            Impression/Formulation:    VISIT DIAGNOSIS:     ICD-10-CM ICD-9-CM   1. Severe episode of recurrent major depressive disorder, without psychotic features  F33.2 296.33   2. Generalized anxiety disorder  F41.1 300.02        Patient appeared alert and oriented.  Patient is voluntarily requesting to continue outpatient therapy at [facility].  Patient is receptive to assistance with maintaining a stable lifestyle.  Patient presents with history of anxiety and depression.  Patient is agreeable to attend routine therapy sessions.  Patient expressed desire to maintain stability and participate in the therapeutic process.        Crisis Plan:  Symptoms and/or behaviors to indicate a crisis: Excessive worry or fear and Feeling sad or low    What calming techniques or other strategies will patient use to de-esclate and stay safe: slow down, breathe, visualize calming self, think it though, listen to music, change focus, take a walk    Who is one person patient can contact to assist with de-escalation? mother    If  symptoms/behaviors persist, Patient will present to the nearest hospital for an assessment. Advised patient of [facility] ER and assessment services.     Plan:   Patient will continue in individual outpatient therapy with focus on improved functioning and coping skills, maintaining stability, and avoiding decompensation and the need for higher level of care.    Patient will contact this office ([facility] at [phone]), call 911 or present to the nearest emergency room should suicidal or homicidal ideations occur. Provide Cognitive Behavioral Therapy and Solution Focused Therapy to improve functioning, maintain stability, and avoid decompensation and the need for higher level of care.     Return in about 3 weeks, or earlier if symptoms worsen or fail to improve.    Recommended Referrals: none        This document has been electronically signed by Chas Ramirez LCSW  October 18, 2023 09:04 EDT        Part of this note may be an electronic transcription/translation of spoken language to printed text using the Dragon Dictation System.

## 2023-10-18 NOTE — TREATMENT PLAN
Multi-Disciplinary Problems (from Behavioral Health Treatment Plan)      Active Problems       Problem: Anxiety  Start Date: 10/18/23      Problem Details: The patient self-scales this problem as a 10 with 10 being the worst.          Goal Priority Start Date Expected End Date End Date    Patient will develop and implement behavioral and cognitive strategies to reduce anxiety and irrational fears. -- 10/18/23 -- --    Goal Details: Progress toward goal:  Not appropriate to rate progress toward goal since this is the initial treatment plan.          Goal Intervention Frequency Start Date End Date    Help patient explore past emotional issues in relation to present anxiety. Q3 Weeks 10/18/23 --    Intervention Details: Duration of treatment until until discharged.          Goal Intervention Frequency Start Date End Date    Help patient develop an awareness of their cognitive and physical responses to anxiety. Q3 Weeks 10/18/23 --    Intervention Details: Duration of treatment until until discharged.                  Problem: Depression  Start Date: 10/18/23      Problem Details: The patient self-scales this problem as a 10 with 10 being the worst.          Goal Priority Start Date Expected End Date End Date    Patient will demonstrate the ability to initiate new constructive life skills outside of sessions on a consistent basis. -- 10/18/23 -- --    Goal Details: Progress toward goal:  Not appropriate to rate progress toward goal since this is the initial treatment plan.          Goal Intervention Frequency Start Date End Date    Assist patient in setting attainable activities of daily living goals. PRN 10/18/23 --      Goal Intervention Frequency Start Date End Date    Provide education about depression Q3 Weeks 10/18/23 --    Intervention Details: Duration of treatment until until discharged.          Goal Intervention Frequency Start Date End Date    Assist patient in developing healthy coping strategies. Q3 Weeks  10/18/23 --    Intervention Details: Duration of treatment until until discharged.                          Reviewed By       Chas Ramirez, [facility] 10/18/23 4062                     I have discussed and reviewed this treatment plan with the patient.

## 2023-10-31 ENCOUNTER — TELEMEDICINE (OUTPATIENT)
Dept: PSYCHIATRY | Facility: CLINIC | Age: 28
End: 2023-10-31
Payer: COMMERCIAL

## 2023-10-31 DIAGNOSIS — F41.1 GENERALIZED ANXIETY DISORDER: ICD-10-CM

## 2023-10-31 DIAGNOSIS — F33.2 SEVERE EPISODE OF RECURRENT MAJOR DEPRESSIVE DISORDER, WITHOUT PSYCHOTIC FEATURES: Primary | ICD-10-CM

## 2023-10-31 DIAGNOSIS — R41.840 CONCENTRATION DEFICIT: ICD-10-CM

## 2023-10-31 PROCEDURE — 99214 OFFICE O/P EST MOD 30 MIN: CPT | Performed by: NURSE PRACTITIONER

## 2023-10-31 RX ORDER — BUPROPION HYDROCHLORIDE 450 MG/1
450 TABLET, FILM COATED, EXTENDED RELEASE ORAL DAILY
Qty: 30 TABLET | Refills: 1 | Status: SHIPPED | OUTPATIENT
Start: 2023-10-31 | End: 2023-12-30

## 2023-10-31 RX ORDER — HYDROCHLOROTHIAZIDE 25 MG/1
25 TABLET ORAL DAILY
Qty: 30 TABLET | Refills: 1 | Status: SHIPPED | OUTPATIENT
Start: 2023-10-31

## 2023-10-31 RX ORDER — LOSARTAN POTASSIUM 50 MG/1
50 TABLET ORAL DAILY
Qty: 30 TABLET | Refills: 1 | Status: SHIPPED | OUTPATIENT
Start: 2023-10-31

## 2023-10-31 RX ORDER — TRAZODONE HYDROCHLORIDE 50 MG/1
50-100 TABLET ORAL NIGHTLY
Qty: 60 TABLET | Refills: 0 | Status: SHIPPED | OUTPATIENT
Start: 2023-10-31 | End: 2023-11-30
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
## 2023-11-01 ENCOUNTER — PRIOR AUTHORIZATION (OUTPATIENT)
Dept: PSYCHIATRY | Facility: CLINIC | Age: 28
End: 2023-11-01
Payer: COMMERCIAL

## 2023-11-17 ENCOUNTER — TELEMEDICINE (OUTPATIENT)
Dept: PSYCHIATRY | Facility: CLINIC | Age: 28
End: 2023-11-17
Payer: COMMERCIAL

## 2023-11-17 DIAGNOSIS — F33.2 SEVERE EPISODE OF RECURRENT MAJOR DEPRESSIVE DISORDER, WITHOUT PSYCHOTIC FEATURES: Primary | ICD-10-CM

## 2023-11-17 DIAGNOSIS — F41.1 GENERALIZED ANXIETY DISORDER: ICD-10-CM

## 2023-11-17 NOTE — PROGRESS NOTES
"[facility]   Follow-up Progress Note     Date: November 17, 2023  Time In: 10:01  Time Out: 10:40      PROGRESS NOTE  Data:  Elicia Arndt is a 28 y.o. female presenting to [facility] (through [facility]), [address] [place], [address] using a secure MyChart Video Visit through [facility] for assessment with Chas Ramirez LCSW. The patient is seen remotely in their  home  using [facility] My Chart. Patient is being seen via telehealth and stated they are in a secure environment for this session. The patient's condition being diagnosed/treated is appropriate for telemedicine. The provider identified herself as well as her credentials. The patient and/or patients guardian consent to be seen remotely, and when consent is given they understand that the consent allows for patient identifiable information to be sent to a third party as needed. They may refuse to be seen remotely at any time. The electronic data is encrypted and password protected, and the patient has been advised of the potential risks to privacy not withstanding such measures.    Today Patient expressed she is worried about her mother and grandmother who are homeless currently. Patient stated she is worried with the weather and their safety. Patient expressed she has offered her home but they declined. Patient stated she has attempted to find them alternative housing but have also decline those options. Processed with patient her anxiety towards her mother's situation and lack of willingness to accept patient's help. Patient expressed she has journaled her thoughts a couple of times and found the practice to be \"relieving.\" Engaged patient in discussing how she can increase the frequency of journaling to help decrease her anxiety.       Clinical Maneuvering/Intervention:    Chief Complaint: anxiety, depression    (Scales based on 0 - 10 with 10 " being the worst)  Depression: 6 Anxiety: 6     Assisted Patient in processing above session content; acknowledged and normalized patient’s thoughts, feelings, and concerns.  Rationalized patient thought process regarding concern about her mother and grandmother's living situation.  Discussed triggers associated with patient's anxiety.  Also discussed coping skills for patient to implement such as journaling and focusing on what is within her control.    Allowed Patient to freely discuss issues  without interruption or judgement with unconditional positive regard, active listening skills, and empathy. Therapist provided a safe, confidential environment to facilitate the development of a positive therapeutic relationship and encouraged open, honest communication. Assisted Patient in identifying risk factors which would indicate the need for higher level of care including thoughts to harm self or others and/or self-harming behavior and encouraged Patient to contact this office, call 911, or present to the nearest emergency room should any of these events occur. Discussed crisis intervention services and means to access. Patient adamantly and convincingly denies current suicidal or homicidal ideation or perceptual disturbance. Assisted Patient in processing session content; acknowledged and normalized Patient’s thoughts, feelings, and concerns by utilizing a person-centered approach in efforts to build appropriate rapport and a positive therapeutic relationship with open and honest communication. Therapist utilized dialectical behavior techniques to teach and model emotional regulation and relaxation methods. Therapist assisted Patient with identifying and implementing healthier coping strategies.     Assessment   Patient appears to be experiencing heightened anxiety and depression in response to COVID-19 epidemic  As a result, they can be reasonably expected to continue to benefit from treatment and would likely be at  increased risk for decompensation otherwise.    Mental Status Exam:   Hygiene:   good  Cooperation:  Cooperative  Eye Contact:  Good  Psychomotor Behavior:  Appropriate  Affect:  Full range  Mood:  happy  Speech:  Normal  Thought Process:  Goal directed  Thought Content:  Mood congruent  Suicidal:  None  Homicidal:  None  Hallucinations:  None  Delusion:  None  Memory:  Deficits  Orientation:  Person, Place, Time, and Situation  Reliability:  good  Insight:  Good  Judgement:  Good  Impulse Control:  Good  Physical/Medical Issues:  No      PHQ-Score Total:  PHQ-9 Total Score:        Patient's Support Network Includes:   and mother    Functional Status: Moderate impairment     Progress toward goal: Not at goal    Prognosis: Good with Ongoing Treatment            Impression/Formulation:    VISIT DIAGNOSIS:     ICD-10-CM ICD-9-CM   1. Severe episode of recurrent major depressive disorder, without psychotic features  F33.2 296.33   2. Generalized anxiety disorder  F41.1 300.02        Patient appeared alert and oriented.  Patient is voluntarily requesting to continue outpatient therapy at [facility].  Patient is receptive to assistance with maintaining a stable lifestyle.  Patient presents with history of anxiety and depression.  Patient is agreeable to attend routine therapy sessions.  Patient expressed desire to maintain stability and participate in the therapeutic process.        Crisis Plan:  Symptoms and/or behaviors to indicate a crisis: Excessive worry or fear and Feeling sad or low    What calming techniques or other strategies will patient use to de-esclate and stay safe: slow down, breathe, visualize calming self, think it though, listen to music, change focus, take a walk    Who is one person patient can contact to assist with de-escalation? mother    If symptoms/behaviors persist, Patient will present to the nearest hospital for an assessment. Advised patient of  [facility] ER and assessment services.     Plan:   Patient will continue in individual outpatient therapy with focus on improved functioning and coping skills, maintaining stability, and avoiding decompensation and the need for higher level of care.    Patient will contact this office ([facility] at [phone]), call 911 or present to the nearest emergency room should suicidal or homicidal ideations occur. Provide Cognitive Behavioral Therapy and Solution Focused Therapy to improve functioning, maintain stability, and avoid decompensation and the need for higher level of care.     Return in about 3 weeks, or earlier if symptoms worsen or fail to improve.    Recommended Referrals: none        This document has been electronically signed by Chas Ramirez LCSW  November 17, 2023 10:01 EST        Part of this note may be an electronic transcription/translation of spoken language to printed text using the Dragon Dictation System.

## 2023-11-22 ENCOUNTER — TELEMEDICINE (OUTPATIENT)
Dept: PSYCHIATRY | Facility: CLINIC | Age: 28
End: 2023-11-22
Payer: COMMERCIAL

## 2023-11-22 ENCOUNTER — PRIOR AUTHORIZATION (OUTPATIENT)
Dept: PSYCHIATRY | Facility: CLINIC | Age: 28
End: 2023-11-22

## 2023-11-22 DIAGNOSIS — R41.840 CONCENTRATION DEFICIT: ICD-10-CM

## 2023-11-22 DIAGNOSIS — F41.1 GENERALIZED ANXIETY DISORDER: Primary | ICD-10-CM

## 2023-11-22 DIAGNOSIS — F33.2 SEVERE EPISODE OF RECURRENT MAJOR DEPRESSIVE DISORDER, WITHOUT PSYCHOTIC FEATURES: ICD-10-CM

## 2023-11-22 RX ORDER — TRAZODONE HYDROCHLORIDE 50 MG/1
50-100 TABLET ORAL NIGHTLY
Qty: 180 TABLET | Refills: 0 | Status: SHIPPED | OUTPATIENT
Start: 2023-11-22 | End: 2024-02-20

## 2023-11-22 NOTE — TELEPHONE ENCOUNTER
Does her insurance prefer two separate prescriptions? One for the 300 mg and the other for the 150 mg?

## 2023-11-22 NOTE — TELEPHONE ENCOUNTER
The response they sent back was- this drug/product is not covered under the pharmacy benefit. I can call the insuranace to get more detail.
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
## 2023-11-27 DIAGNOSIS — F33.2 SEVERE EPISODE OF RECURRENT MAJOR DEPRESSIVE DISORDER, WITHOUT PSYCHOTIC FEATURES: ICD-10-CM

## 2023-11-27 DIAGNOSIS — F41.1 GENERALIZED ANXIETY DISORDER: Primary | ICD-10-CM

## 2023-11-27 DIAGNOSIS — R41.840 CONCENTRATION DEFICIT: ICD-10-CM

## 2023-11-27 RX ORDER — METOPROLOL SUCCINATE 25 MG/1
25 TABLET, EXTENDED RELEASE ORAL DAILY
Qty: 30 TABLET | Refills: 0 | Status: SHIPPED | OUTPATIENT
Start: 2023-11-27

## 2023-11-27 RX ORDER — BUPROPION HYDROCHLORIDE 150 MG/1
150 TABLET ORAL EVERY MORNING
Qty: 90 TABLET | Refills: 0 | Status: SHIPPED | OUTPATIENT
Start: 2023-11-27 | End: 2024-02-25

## 2023-11-27 RX ORDER — BUPROPION HYDROCHLORIDE 300 MG/1
300 TABLET ORAL EVERY MORNING
Qty: 90 TABLET | Refills: 0 | Status: SHIPPED | OUTPATIENT
Start: 2023-11-27 | End: 2024-02-25

## 2023-11-27 NOTE — TELEPHONE ENCOUNTER
PA denied- denial scanned in to chart- In order to achieve your desired dose equal to bupropion extended release 450 mg you may want to talk to your provider to prescribe bupropion extended release 150 mg taking three tablets daily or bupropion extended release 150 mg in addition to bupropion extended release 300 mg. Please advise

## 2023-11-27 NOTE — PROGRESS NOTES
Due to insurance denial/preference, two separate prescriptions have been sent in for Wellbutrin to equal 450 mg dose.

## 2023-12-06 ENCOUNTER — TELEMEDICINE (OUTPATIENT)
Dept: PSYCHIATRY | Facility: CLINIC | Age: 28
End: 2023-12-06
Payer: COMMERCIAL

## 2023-12-06 DIAGNOSIS — F33.2 SEVERE EPISODE OF RECURRENT MAJOR DEPRESSIVE DISORDER, WITHOUT PSYCHOTIC FEATURES: Primary | ICD-10-CM

## 2023-12-06 DIAGNOSIS — F41.1 GENERALIZED ANXIETY DISORDER: ICD-10-CM

## 2023-12-06 NOTE — PROGRESS NOTES
[facility]   Follow-up Progress Note     Date: December 6, 2023  Time In: 12:31  Time Out: 12:53      PROGRESS NOTE  Data:  Elicia Arndt is a 28 y.o. female presenting to [facility] (through [facility]), [address] [place], [address] using a secure MyChart Video Visit through [facility] for assessment with Chas Ramirez LCSW. The patient is seen remotely in their home using [facility] My Chart. Patient is being seen via telehealth and stated they are in a secure environment for this session. The patient's condition being diagnosed/treated is appropriate for telemedicine. The provider identified herself as well as her credentials. The patient and/or patients guardian consent to be seen remotely, and when consent is given they understand that the consent allows for patient identifiable information to be sent to a third party as needed. They may refuse to be seen remotely at any time. The electronic data is encrypted and password protected, and the patient has been advised of the potential risks to privacy not withstanding such measures.    Today Patient expressed her thanksgiving holiday went well. Patient stated there was some stress after due to her father's now ex-girlfriend upset that he stayed with patient. Patient stated her mother and grandmother came to stay the night before Thanksgiving. Patient discussed her grandmother's dissatisfaction with her sleeping arrangements gave patient insight that them living with her might not be in anyone's best interest. Patient stated for the time her grandmother has consistent housing and her mother is working towards repairing her circumstances. Patient stated she did start journaling and felt relief from the process.       Clinical Maneuvering/Intervention:    Chief Complaint: anxiety, depression    (Scales based on 0 - 10 with 10 being the worst)  Depression: 6  Anxiety: 6     Assisted Patient in processing above session content; acknowledged and normalized patient’s thoughts, feelings, and concerns.  Rationalized patient thought process regarding letting go of past negative experiences.  Discussed triggers associated with patient's depression.  Also discussed coping skills for patient to implement such as journaling.    Allowed Patient to freely discuss issues  without interruption or judgement with unconditional positive regard, active listening skills, and empathy. Therapist provided a safe, confidential environment to facilitate the development of a positive therapeutic relationship and encouraged open, honest communication. Assisted Patient in identifying risk factors which would indicate the need for higher level of care including thoughts to harm self or others and/or self-harming behavior and encouraged Patient to contact this office, call 911, or present to the nearest emergency room should any of these events occur. Discussed crisis intervention services and means to access. Patient adamantly and convincingly denies current suicidal or homicidal ideation or perceptual disturbance. Assisted Patient in processing session content; acknowledged and normalized Patient’s thoughts, feelings, and concerns by utilizing a person-centered approach in efforts to build appropriate rapport and a positive therapeutic relationship with open and honest communication. Therapist utilized dialectical behavior techniques to teach and model emotional regulation and relaxation methods. Therapist assisted Patient with identifying and implementing healthier coping strategies.     Assessment   Patient appears to be experiencing heightened anxiety and depression in response to COVID-19 epidemic  As a result, they can be reasonably expected to continue to benefit from treatment and would likely be at increased risk for decompensation otherwise.    Mental Status Exam:   Hygiene:    good  Cooperation:  Cooperative  Eye Contact:  Good  Psychomotor Behavior:  Appropriate  Affect:  Full range  Mood: normal  Speech:  Normal  Thought Process:  Goal directed  Thought Content:  Mood congruent  Suicidal:  None  Homicidal:  None  Hallucinations:  None  Delusion:  None  Memory:  Intact  Orientation:  Person, Place, Time, and Situation  Reliability:  good  Insight:  Good  Judgement:  Good  Impulse Control:  Good  Physical/Medical Issues:  No      PHQ-Score Total:  PHQ-9 Total Score:        Patient's Support Network Includes:   and mother    Functional Status: Moderate impairment     Progress toward goal: Not at goal    Prognosis: Good with Ongoing Treatment            Impression/Formulation:    VISIT DIAGNOSIS:     ICD-10-CM ICD-9-CM   1. Severe episode of recurrent major depressive disorder, without psychotic features  F33.2 296.33   2. Generalized anxiety disorder  F41.1 300.02        Patient appeared alert and oriented.  Patient is voluntarily requesting to continue outpatient therapy at [facility].  Patient is receptive to assistance with maintaining a stable lifestyle.  Patient presents with history of anxiety and depression.  Patient is agreeable to attend routine therapy sessions.  Patient expressed desire to maintain stability and participate in the therapeutic process.        Crisis Plan:  Symptoms and/or behaviors to indicate a crisis: Excessive worry or fear and Feeling sad or low    What calming techniques or other strategies will patient use to de-esclate and stay safe: slow down, breathe, visualize calming self, think it though, listen to music, change focus, take a walk    Who is one person patient can contact to assist with de-escalation? mother    If symptoms/behaviors persist, Patient will present to the nearest hospital for an assessment. Advised patient of [facility] and assessment services.     Plan:   Patient will continue in  individual outpatient therapy with focus on improved functioning and coping skills, maintaining stability, and avoiding decompensation and the need for higher level of care.    Patient will contact this office ([facility] at [phone]), call 911 or present to the nearest emergency room should suicidal or homicidal ideations occur. Provide Cognitive Behavioral Therapy and Solution Focused Therapy to improve functioning, maintain stability, and avoid decompensation and the need for higher level of care.     Return in about 3 weeks, or earlier if symptoms worsen or fail to improve.    Recommended Referrals: none        This document has been electronically signed by Chas Ramirez LCSW  December 6, 2023 12:31 EST        Part of this note may be an electronic transcription/translation of spoken language to printed text using the Dragon Dictation System.

## 2023-12-26 NOTE — TELEPHONE ENCOUNTER
LOV 10/11/2023  NOV     Left message on machine for patient to call    Relay the following information:    Patient needs to schedule a follow up appointment for additional refills.  Please advise patient she needs to schedule and keep her appointment to continue to receive refills in the future.  If she is unable to keep her appointment we will not be able to provider further refills.  Once appointment has been scheduled please update message with date and time so we can process the request.  We will forward the message to the provider to review the refill request.

## 2023-12-29 RX ORDER — LOSARTAN POTASSIUM 50 MG/1
50 TABLET ORAL DAILY
Qty: 30 TABLET | Refills: 0 | Status: SHIPPED | OUTPATIENT
Start: 2023-12-29

## 2023-12-29 RX ORDER — HYDROCHLOROTHIAZIDE 25 MG/1
25 TABLET ORAL DAILY
Qty: 30 TABLET | Refills: 0 | Status: SHIPPED | OUTPATIENT
Start: 2023-12-29

## 2024-01-08 NOTE — TELEPHONE ENCOUNTER
LOV 10/11/2023  NOV     RELAY: We recently received your message to refill your medication(s).  Our records indicate that you did not schedule a follow up appointment with your provider at your last visit on 10/11/2023. The medication that you are on requires a follow up appointment every 6 months. Please let us know what days/times work for you and we will be happy to set up an appointment.  You may also contact our office at [phone] to schedule your appointment.  If you are unable to keep your appointment, we will not be able to provide further refills.  Once you have scheduled your appointment, we will be happy to process your refill request.

## 2024-01-09 NOTE — TELEPHONE ENCOUNTER
2nd attempt     LOV 10/11/2023  NOV      RELAY: We recently received your message to refill your medication(s).  Our records indicate that you did not schedule a follow up appointment with your provider at your last visit on 10/11/2023. The medication that you are on requires a follow up appointment every 6 months. Please let us know what days/times work for you and we will be happy to set up an appointment.  You may also contact our office at [phone] to schedule your appointment.  If you are unable to keep your appointment, we will not be able to provide further refills.  Once you have scheduled your appointment, we will be happy to process your refill request.

## 2024-01-10 NOTE — TELEPHONE ENCOUNTER
3rd attempt     LOV 10/11/2023  NOV      RELAY: We recently received your message to refill your medication(s).  Our records indicate that you did not schedule a follow up appointment with your provider at your last visit on 10/11/2023. The medication that you are on requires a follow up appointment every 6 months. Please let us know what days/times work for you and we will be happy to set up an appointment.  You may also contact our office at [phone] to schedule your appointment.  If you are unable to keep your appointment, we will not be able to provide further refills.  Once you have scheduled your appointment, we will be happy to process your refill request.

## 2024-01-11 RX ORDER — METOPROLOL SUCCINATE 25 MG/1
25 TABLET, EXTENDED RELEASE ORAL DAILY
Qty: 90 TABLET | Refills: 0 | Status: SHIPPED | OUTPATIENT
Start: 2024-01-11

## 2024-01-24 ENCOUNTER — OFFICE VISIT (OUTPATIENT)
Dept: INTERNAL MEDICINE | Facility: CLINIC | Age: 29
End: 2024-01-24
Payer: COMMERCIAL

## 2024-01-24 VITALS
BODY MASS INDEX: 31.48 KG/M2 | DIASTOLIC BLOOD PRESSURE: 70 MMHG | HEIGHT: 64 IN | SYSTOLIC BLOOD PRESSURE: 120 MMHG | HEART RATE: 64 BPM | WEIGHT: 184.4 LBS | RESPIRATION RATE: 16 BRPM | TEMPERATURE: 97.7 F

## 2024-01-24 DIAGNOSIS — I10 HYPERTENSION, UNSPECIFIED TYPE: Primary | ICD-10-CM

## 2024-01-24 PROCEDURE — 99214 OFFICE O/P EST MOD 30 MIN: CPT | Performed by: NURSE PRACTITIONER

## 2024-01-24 RX ORDER — HYDROCHLOROTHIAZIDE 25 MG/1
25 TABLET ORAL DAILY
Qty: 90 TABLET | Refills: 0 | Status: SHIPPED | OUTPATIENT
Start: 2024-01-24

## 2024-01-24 RX ORDER — LOSARTAN POTASSIUM 50 MG/1
50 TABLET ORAL DAILY
Qty: 90 TABLET | Refills: 0 | Status: SHIPPED | OUTPATIENT
Start: 2024-01-24

## 2024-01-24 NOTE — PATIENT INSTRUCTIONS
MyPlate from USDA  MyPlate is an outline of a general healthy diet based on the Dietary Guidelines for Americans, 3955-6721, from the U.S. Department of Agriculture (USDA). It sets guidelines for how much food you should eat from each food group based on your age, sex, and level of physical activity.  What are tips for following MyPlate?  To follow MyPlate recommendations:  Eat a wide variety of fruits and vegetables, grains, and protein foods.  Serve smaller portions and eat less food throughout the day.  Limit portion sizes to avoid overeating.  Enjoy your food.  Get at least 150 minutes of exercise every week. This is about 30 minutes each day, 5 or more days per week.  It can be difficult to have every meal look like MyPlate. Think about MyPlate as eating guidelines for an entire day, rather than each individual meal.  Fruits and vegetables  Make one half of your plate fruits and vegetables.  Eat many different colors of fruits and vegetables each day.  For a 2,000-calorie daily food plan, eat:  2½ cups of vegetables every day.  2 cups of fruit every day.  1 cup is equal to:  1 cup raw or cooked vegetables.  1 cup raw fruit.  1 medium-sized orange, apple, or banana.  1 cup 100% fruit or vegetable juice.  2 cups raw leafy greens, such as lettuce, spinach, or kale.  ½ cup dried fruit.  Grains  One fourth of your plate should be grains.  Make at least half of the grains you eat each day whole grains.  For a 2,000-calorie daily food plan, eat 6 oz of grains every day.  1 oz is equal to:  1 slice bread.  1 cup cereal.  ½ cup cooked rice, cereal, or pasta.  Protein  One fourth of your plate should be protein.  Eat a wide variety of protein foods, including meat, poultry, fish, eggs, beans, nuts, and tofu.  For a 2,000-calorie daily food plan, eat 5½ oz of protein every day.  1 oz is equal to:  1 oz meat, poultry, or fish.  ¼ cup cooked beans.  1 egg.  ½ oz nuts or seeds.  1 Tbsp peanut butter.  Dairy  Drink fat-free  or low-fat (1%) milk.  Eat or drink dairy as a side to meals.  For a 2,000-calorie daily food plan, eat or drink 3 cups of dairy every day.  1 cup is equal to:  1 cup milk, yogurt, cottage cheese, or soy milk (soy beverage).  2 oz processed cheese.  1½ oz natural cheese.  Fats, oils, salt, and sugars  Only small amounts of oils are recommended.  Avoid foods that are high in calories and low in nutritional value (empty calories), like foods high in fat or added sugars.  Choose foods that are low in salt (sodium). Choose foods that have less than 140 milligrams (mg) of sodium per serving.  Drink water instead of sugary drinks. Drink enough fluid to keep your urine pale yellow.  Where to find support  Work with your health care provider or a dietitian to develop a customized eating plan that is right for you.  Download an donnell (mobile application) to help you track your daily food intake.  Where to find more information  USDA: ChooseMyPlate.gov  Summary  MyPlate is a general guideline for healthy eating from the USDA. It is based on the Dietary Guidelines for Americans, 4780-8364.  In general, fruits and vegetables should take up one half of your plate, grains should take up one fourth of your plate, and protein should take up one fourth of your plate.  This information is not intended to replace advice given to you by your health care provider. Make sure you discuss any questions you have with your health care provider.  Document Revised: 11/08/2021 Document Reviewed: 11/08/2021  [employer] Patient Education © 2022 Else[employer] Inc.

## 2024-01-24 NOTE — PROGRESS NOTES
"Patient Name: Elicia Arndt  : 1995   MRN: [id number]     Chief Complaint:    Chief Complaint   Patient presents with    Hypertension     Follow up       History of Present Illness: Elicia Arndt is a 28 y.o. female presents to clinic for follow-up:       Hypertension  Patient has not been monitoring her blood pressure at home. Her regimen consists of  losartan 50 mg, HCTZ 25 mg and metoprolol XL 25 mg daily.  All her cardiac work-ups have been normal.  She denies any chest pain, palpitations or shortness of breath.  She has noticed her heart rate going down in the 40s at rest and she has been more tired.           Subjective     Review of System: Review of Systems     Medications:     Current Outpatient Medications:     buPROPion XL (Wellbutrin XL) 150 MG 24 hr tablet, Take 1 tablet by mouth Every Morning for 90 days., Disp: 90 tablet, Rfl: 0    buPROPion XL (Wellbutrin XL) 300 MG 24 hr tablet, Take 1 tablet by mouth Every Morning for 90 days., Disp: 90 tablet, Rfl: 0    Cariprazine HCl (Vraylar) 3 MG capsule capsule, Take 1 capsule by mouth Daily for 90 days., Disp: 90 capsule, Rfl: 0    hydroCHLOROthiazide (HYDRODIURIL) 25 MG tablet, Take 1 tablet by mouth Daily., Disp: 90 tablet, Rfl: 0    hydrOXYzine (ATARAX) 25 MG tablet, Take 1/2 to 1 tablet by mouth every night for sleep/ anxiety., Disp: 30 tablet, Rfl: 2    losartan (COZAAR) 50 MG tablet, Take 1 tablet by mouth Daily., Disp: 90 tablet, Rfl: 0    traZODone (DESYREL) 50 MG tablet, Take 1-2 tablets by mouth Every Night for 90 days., Disp: 180 tablet, Rfl: 0    Allergies:   Allergies   Allergen Reactions    Adhesive Tape Hives     Red and hives         Objective     Physical Exam:   Vital Signs:   Vitals:    24 0812   BP: 120/70   BP Location: Right arm   Patient Position: Sitting   Cuff Size: Adult   Pulse: 64   Resp: 16   Temp: 97.7 °F (36.5 °C)   TempSrc: Infrared   Weight: 83.6 kg (184 lb 6.4 oz)   Height: 161.9 cm (63.75\")   PainSc: " 0-No pain     Body mass index is 31.9 kg/m².   BMI is >= 30 and <35. (Class 1 Obesity). The following options were offered after discussion;: weight loss educational material (shared in after visit summary)           Physical Exam  Constitutional:       General: She is not in acute distress.     Appearance: She is not ill-appearing.   HENT:      Head: Normocephalic.   Cardiovascular:      Rate and Rhythm: Normal rate and regular rhythm.      Heart sounds: Normal heart sounds. No murmur heard.  Pulmonary:      Breath sounds: Normal breath sounds.   Neurological:      General: No focal deficit present.      Mental Status: She is oriented to person, place, and time.   Psychiatric:         Mood and Affect: Mood normal.     Assessment / Plan      Assessment/Plan:   Diagnoses and all orders for this visit:    1. Hypertension, unspecified type (Primary)  -     losartan (COZAAR) 50 MG tablet; Take 1 tablet by mouth Daily.  Dispense: 90 tablet; Refill: 0  -     hydroCHLOROthiazide (HYDRODIURIL) 25 MG tablet; Take 1 tablet by mouth Daily.  Dispense: 90 tablet; Refill: 0         Trial off metoprolol. Monitor blood pressure at home and record. Bring to next appointment. If BP is >140/90 then f/u sooner.  Please follow-up sooner  if no improvement in heart rate or palpitations occur.       Follow Up:   Return in about 3 months (around 4/24/2024) for Annual.    ECTOR Alvarado  [facility]

## 2024-01-29 DIAGNOSIS — I49.5 TACHYCARDIA-BRADYCARDIA: Primary | ICD-10-CM

## 2024-01-30 DIAGNOSIS — I10 HYPERTENSION, UNSPECIFIED TYPE: ICD-10-CM

## 2024-01-30 RX ORDER — LOSARTAN POTASSIUM 50 MG/1
50 TABLET ORAL DAILY
Qty: 30 TABLET | OUTPATIENT
Start: 2024-01-30

## 2024-01-30 RX ORDER — HYDROCHLOROTHIAZIDE 25 MG/1
25 TABLET ORAL DAILY
Qty: 30 TABLET | OUTPATIENT
Start: 2024-01-30

## 2024-02-02 ENCOUNTER — TELEMEDICINE (OUTPATIENT)
Dept: PSYCHIATRY | Facility: CLINIC | Age: 29
End: 2024-02-02
Payer: COMMERCIAL

## 2024-02-02 DIAGNOSIS — F33.2 SEVERE EPISODE OF RECURRENT MAJOR DEPRESSIVE DISORDER, WITHOUT PSYCHOTIC FEATURES: Primary | ICD-10-CM

## 2024-02-02 DIAGNOSIS — F41.1 GENERALIZED ANXIETY DISORDER: ICD-10-CM

## 2024-02-02 NOTE — PROGRESS NOTES
"[facility]   Follow-up Progress Note     Date: February 2, 2024  Time In: 9:04  Time Out: 9:34      PROGRESS NOTE  Data:  Elicia Arndt is a 28 y.o. female presenting to [facility] (through [facility]), [address] [place], [address] using a secure MyChart Video Visit through [facility] for assessment with Chas Ramirez LCSW. The patient is seen remotely in their  home  using [facility] My Chart. Patient is being seen via telehealth and stated they are in a secure environment for this session. The patient's condition being diagnosed/treated is appropriate for telemedicine. The provider identified herself as well as her credentials. The patient and/or patients guardian consent to be seen remotely, and when consent is given they understand that the consent allows for patient identifiable information to be sent to a third party as needed. They may refuse to be seen remotely at any time. The electronic data is encrypted and password protected, and the patient has been advised of the potential risks to privacy not withstanding such measures.    Today Patient stated since the previous appointment her sister in law had triple bypass surgery. Patient stated she has also been having heart problems as well. Patient stated she is wearing a heart monitor to assess what exactly is happening. Patient she has to wear it for about two weeks. Patient stated an added stressor is not being able to take her medication due to the pharmacy not having it in stock. Patient expressed she is \"struggling.\" Patient expressed she is experiencing increased depression and fear of what could be wrong with her heart. Processed with patient her thoughts and emotions about her health. Patient expressed the holidays went well and she was able to see her mother. Patient stated she is currently  not in contact with her grandmother who made threats " to call CPS to retaliate against patient's mother. Patient stated she was never contacted by CPS but feels she needs to maintain distance due to the conflict between her mother and grandmother. Processed with patient being aware of carrying emotional weight from situations that she does not have control over. Discussed with patient prioritizing her health needs.       Clinical Maneuvering/Intervention:    Chief Complaint: anxiety, depression    (Scales based on 0 - 10 with 10 being the worst)  Depression: 10 Anxiety: 5     Assisted Patient in processing above session content; acknowledged and normalized patient’s thoughts, feelings, and concerns.  Rationalized patient thought process regarding recent health concerns.  Discussed triggers associated with patient's depression.  Also discussed coping skills for patient to implement such as prioritizing patient's health needs.    Allowed Patient to freely discuss issues  without interruption or judgement with unconditional positive regard, active listening skills, and empathy. Therapist provided a safe, confidential environment to facilitate the development of a positive therapeutic relationship and encouraged open, honest communication. Assisted Patient in identifying risk factors which would indicate the need for higher level of care including thoughts to harm self or others and/or self-harming behavior and encouraged Patient to contact this office, call 911, or present to the nearest emergency room should any of these events occur. Discussed crisis intervention services and means to access. Patient adamantly and convincingly denies current suicidal or homicidal ideation or perceptual disturbance. Assisted Patient in processing session content; acknowledged and normalized Patient’s thoughts, feelings, and concerns by utilizing a person-centered approach in efforts to build appropriate rapport and a positive therapeutic relationship with open and honest communication.  Therapist utilized dialectical behavior techniques to teach and model emotional regulation and relaxation methods. Therapist assisted Patient with identifying and implementing healthier coping strategies.     Assessment   Patient appears to be experiencing heightened anxiety and depression in response to COVID-19 epidemic  As a result, they can be reasonably expected to continue to benefit from treatment and would likely be at increased risk for decompensation otherwise.    Mental Status Exam:   Hygiene:   good  Cooperation:  Cooperative  Eye Contact:  Good  Psychomotor Behavior:  Appropriate  Affect:  Full range  Mood: sad  Speech:  Normal  Thought Process:  Goal directed  Thought Content:  Mood congruent  Suicidal:  None  Homicidal:  None  Hallucinations:  None  Delusion:  None  Memory:  Intact  Orientation:  Person, Place, Time, and Situation  Reliability:  good  Insight:  Good  Judgement:  Good  Impulse Control:  Good  Physical/Medical Issues:  No      PHQ-Score Total:  PHQ-9 Total Score:        Patient's Support Network Includes:   and mother    Functional Status: Moderate impairment     Progress toward goal: Not at goal    Prognosis: Good with Ongoing Treatment            Impression/Formulation:    VISIT DIAGNOSIS:     ICD-10-CM ICD-9-CM   1. Severe episode of recurrent major depressive disorder, without psychotic features  F33.2 296.33   2. Generalized anxiety disorder  F41.1 300.02        Patient appeared alert and oriented.  Patient is voluntarily requesting to continue outpatient therapy at [facility].  Patient is receptive to assistance with maintaining a stable lifestyle.  Patient presents with history of depression and anxiety.  Patient is agreeable to attend routine therapy sessions.  Patient expressed desire to maintain stability and participate in the therapeutic process.        Crisis Plan:  Symptoms and/or behaviors to indicate a crisis: Excessive worry  or fear and Feeling sad or low    What calming techniques or other strategies will patient use to de-esclate and stay safe: slow down, breathe, visualize calming self, think it though, listen to music, change focus, take a walk    Who is one person patient can contact to assist with de-escalation? mother    If symptoms/behaviors persist, Patient will present to the nearest hospital for an assessment. Advised patient of [facility] ER and assessment services.     Plan:   Patient will continue in individual outpatient therapy with focus on improved functioning and coping skills, maintaining stability, and avoiding decompensation and the need for higher level of care.    Patient will contact this office ([facility] at [phone]), call 911 or present to the nearest emergency room should suicidal or homicidal ideations occur. Provide Cognitive Behavioral Therapy and Solution Focused Therapy to improve functioning, maintain stability, and avoid decompensation and the need for higher level of care.     Return in about 3 weeks, or earlier if symptoms worsen or fail to improve.    Recommended Referrals: none        This document has been electronically signed by Chas Ramirez LCSW  February 2, 2024 09:04 EST        Part of this note may be an electronic transcription/translation of spoken language to printed text using the Dragon Dictation System.

## 2024-02-08 ENCOUNTER — TELEMEDICINE (OUTPATIENT)
Dept: PSYCHIATRY | Facility: CLINIC | Age: 29
End: 2024-02-08
Payer: COMMERCIAL

## 2024-02-08 DIAGNOSIS — F41.1 GENERALIZED ANXIETY DISORDER: Primary | ICD-10-CM

## 2024-02-08 DIAGNOSIS — F41.1 GENERALIZED ANXIETY DISORDER: ICD-10-CM

## 2024-02-08 DIAGNOSIS — F33.2 SEVERE EPISODE OF RECURRENT MAJOR DEPRESSIVE DISORDER, WITHOUT PSYCHOTIC FEATURES: Primary | ICD-10-CM

## 2024-02-08 DIAGNOSIS — F33.2 SEVERE EPISODE OF RECURRENT MAJOR DEPRESSIVE DISORDER, WITHOUT PSYCHOTIC FEATURES: ICD-10-CM

## 2024-02-08 RX ORDER — BUPROPION HYDROCHLORIDE 150 MG/1
450 TABLET ORAL EVERY MORNING
Qty: 30 TABLET | Refills: 2 | Status: SHIPPED | OUTPATIENT
Start: 2024-02-08 | End: 2024-03-09

## 2024-02-08 RX ORDER — BUPROPION HYDROCHLORIDE 450 MG/1
450 TABLET, FILM COATED, EXTENDED RELEASE ORAL EVERY MORNING
Qty: 30 TABLET | Refills: 2 | Status: SHIPPED | OUTPATIENT
Start: 2024-02-08 | End: 2024-02-08 | Stop reason: CLARIF

## 2024-02-08 RX ORDER — BUPROPION HYDROCHLORIDE 450 MG/1
450 TABLET, FILM COATED, EXTENDED RELEASE ORAL EVERY MORNING
Qty: 30 TABLET | Refills: 2 | Status: SHIPPED | OUTPATIENT
Start: 2024-02-08 | End: 2024-02-08
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
## 2024-02-23 ENCOUNTER — TELEMEDICINE (OUTPATIENT)
Dept: PSYCHIATRY | Facility: CLINIC | Age: 29
End: 2024-02-23
Payer: COMMERCIAL

## 2024-02-23 DIAGNOSIS — F33.2 SEVERE EPISODE OF RECURRENT MAJOR DEPRESSIVE DISORDER, WITHOUT PSYCHOTIC FEATURES: Primary | ICD-10-CM

## 2024-02-23 DIAGNOSIS — F33.2 SEVERE EPISODE OF RECURRENT MAJOR DEPRESSIVE DISORDER, WITHOUT PSYCHOTIC FEATURES: ICD-10-CM

## 2024-02-23 DIAGNOSIS — F41.1 GENERALIZED ANXIETY DISORDER: ICD-10-CM

## 2024-02-23 NOTE — PROGRESS NOTES
[facility]   Follow-up Progress Note     Date: February 23, 2024  Time In: 10:01  Time Out: 10:22      PROGRESS NOTE  Data:  Elicia Arndt is a 28 y.o. female presenting to [facility] (through [facility]), [address], [place], [address] using a secure MyChart Video Visit through [facility] for assessment with Chas Ramirez LCSW. The patient is seen remotely in their  home  using [facility] My Chart. Patient is being seen via telehealth and stated they are in a secure environment for this session. The patient's condition being diagnosed/treated is appropriate for telemedicine. The provider identified herself as well as her credentials. The patient and/or patients guardian consent to be seen remotely, and when consent is given they understand that the consent allows for patient identifiable information to be sent to a third party as needed. They may refuse to be seen remotely at any time. The electronic data is encrypted and password protected, and the patient has been advised of the potential risks to privacy not withstanding such measures.    Today Patient stated she was able to get her medicine filled which has helped support her mood. Patient stated she completed the heart monitoring for two weeks and is waiting the results. Patient expressed she is still fatigued but otherwise is doing well. Patient stated she did recently get into a disagreement with her mother over finances. Patient expressed frustration with her mother loaning out money then borrowing money from patient. Patient expressed it has caused financial strain on her family. Processed with patient the challenge of setting boundaries to provide protection for herself and her family despite it possibly not helping her mother.       Clinical Maneuvering/Intervention:    Chief Complaint: anxiety, depression     (Scales based on 0 - 10 with 10 being  the worst)  Depression: 5 Anxiety: 5     Assisted Patient in processing above session content; acknowledged and normalized patient’s thoughts, feelings, and concerns.  Rationalized patient thought process regarding setting boundaries with her mother financially.  Discussed triggers associated with patient's anxiety.  Also discussed coping skills for patient to implement such as maintaining boundaries.    Allowed Patient to freely discuss issues  without interruption or judgement with unconditional positive regard, active listening skills, and empathy. Therapist provided a safe, confidential environment to facilitate the development of a positive therapeutic relationship and encouraged open, honest communication. Assisted Patient in identifying risk factors which would indicate the need for higher level of care including thoughts to harm self or others and/or self-harming behavior and encouraged Patient to contact this office, call 911, or present to the nearest emergency room should any of these events occur. Discussed crisis intervention services and means to access. Patient adamantly and convincingly denies current suicidal or homicidal ideation or perceptual disturbance. Assisted Patient in processing session content; acknowledged and normalized Patient’s thoughts, feelings, and concerns by utilizing a person-centered approach in efforts to build appropriate rapport and a positive therapeutic relationship with open and honest communication. Therapist utilized dialectical behavior techniques to teach and model emotional regulation and relaxation methods. Therapist assisted Patient with identifying and implementing healthier coping strategies.     Assessment   Patient appears to be experiencing heightened anxiety and depression in response to COVID-19 epidemic  As a result, they can be reasonably expected to continue to benefit from treatment and would likely be at increased risk for decompensation  otherwise.    Mental Status Exam:   Hygiene:   good  Cooperation:  Cooperative  Eye Contact:  Good  Psychomotor Behavior:  Appropriate  Affect:  Full range  Mood: normal  Speech:  Normal  Thought Process:  Goal directed  Thought Content:  Mood congruent  Suicidal:  None  Homicidal:  None  Hallucinations:  None  Delusion:  None  Memory:  Intact  Orientation:  Person, Place, Time, and Situation  Reliability:  good  Insight:  Good  Judgement:  Good  Impulse Control:  Good  Physical/Medical Issues:  No      PHQ-Score Total:  PHQ-9 Total Score:        Patient's Support Network Includes:   and mother    Functional Status: Moderate impairment     Progress toward goal: Not at goal    Prognosis: Good with Ongoing Treatment            Impression/Formulation:    VISIT DIAGNOSIS:     ICD-10-CM ICD-9-CM   1. Severe episode of recurrent major depressive disorder, without psychotic features  F33.2 296.33   2. Generalized anxiety disorder  F41.1 300.02        Patient appeared alert and oriented.  Patient is voluntarily requesting to continue outpatient therapy at [facility].  Patient is receptive to assistance with maintaining a stable lifestyle.  Patient presents with history of depression and anxiety.  Patient is agreeable to attend routine therapy sessions.  Patient expressed desire to maintain stability and participate in the therapeutic process.        Crisis Plan:  Symptoms and/or behaviors to indicate a crisis: Excessive worry or fear and Feeling sad or low    What calming techniques or other strategies will patient use to de-esclate and stay safe: slow down, breathe, visualize calming self, think it though, listen to music, change focus, take a walk    Who is one person patient can contact to assist with de-escalation? mother    If symptoms/behaviors persist, Patient will present to the nearest hospital for an assessment. Advised patient of [facility] ER and assessment  services.     Plan:   Patient will continue in individual outpatient therapy with focus on improved functioning and coping skills, maintaining stability, and avoiding decompensation and the need for higher level of care.    Patient will contact this office ([facility] at [phone]), call 911 or present to the nearest emergency room should suicidal or homicidal ideations occur. Provide Cognitive Behavioral Therapy and Solution Focused Therapy to improve functioning, maintain stability, and avoid decompensation and the need for higher level of care.     Return in about 4 weeks, or earlier if symptoms worsen or fail to improve.    Recommended Referrals: none        This document has been electronically signed by Chas Ramirez LCSW  February 23, 2024 10:01 EST        Part of this note may be an electronic transcription/translation of spoken language to printed text using the Dragon Dictation System.

## 2024-02-26 RX ORDER — TRAZODONE HYDROCHLORIDE 50 MG/1
TABLET ORAL
Qty: 180 TABLET | Refills: 0 | Status: SHIPPED | OUTPATIENT
Start: 2024-02-26

## 2024-03-01 DIAGNOSIS — I49.5 TACHYCARDIA-BRADYCARDIA: Primary | ICD-10-CM

## 2024-03-06 NOTE — PROGRESS NOTES
"[facility]  Heart and Valve Center      Chief Complaint  Rapid Heart Rate    History of Present Illness    Elicia Arndt is a 28 y.o. female with hypertension and anxiety/depression, tachycardia who presents today for ongoing evaluation of tachycardia.     Patient wore a Holter monitor in 2022 which showed no arrhythmias but average heart rate was 100. She also had a normal echo and nuclear stress test at the time.  She was started on metoprolol succinate and recently reports noticing heart rates in the 40s at rest on her AppleWatch with associated fatigue.  Her metoprolol was discontinued.  She was placed in another Holter monitor by her primary care provider and average heart rate was 102, minimum heart rate was 66.  She had one 9 beat run of NSVT at 2 AM. She was started back on her metoprolol.    She says she feels \"like crap\". She does not recall being off the metoprolol and if she felt better. She notes intermittent heart skipping. 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 She does take naps and wakes up tired. She does snore. Only sleeps 4 hours a night and snoring will wake her up.  She has been exercising without any exertional symptoms except for some fatigue and mild exertional dyspnea.  Denies exertional chest pain.  Occasional get some chest pains at rest, especially at work when she is stressed    Cardiac risks: Hypertension, tobacco abuse     Objective     Vital Signs:   Vitals:    03/07/24 1020   BP: 128/85   BP Location: Left arm   Patient Position: Sitting   Cuff Size: Adult   Pulse: 94   Resp: 16   Temp: 97.3 °F (36.3 °C)   TempSrc: Temporal   SpO2: 98%   Weight: 79.8 kg (176 lb)   Height: 160 cm (63\")       Body mass index is 31.18 kg/m².      Physical Exam  Vitals reviewed.   Constitutional:       Appearance: Normal appearance.   HENT:      Head: Normocephalic.   Neck:      Vascular: No carotid bruit.   Cardiovascular:      Rate and Rhythm: Normal rate and " regular rhythm.      Pulses: Normal pulses.      Heart sounds: Normal heart sounds, S1 normal and S2 normal. No murmur heard.  Pulmonary:      Effort: Pulmonary effort is normal. No respiratory distress.      Breath sounds: Normal breath sounds.   Chest:      Chest wall: No tenderness.   Abdominal:      General: Abdomen is flat.      Palpations: Abdomen is soft.   Musculoskeletal:      Cervical back: Neck supple.      Right lower leg: No edema.      Left lower leg: No edema.   Skin:     General: Skin is warm and dry.   Neurological:      General: No focal deficit present.      Mental Status: She is alert and oriented to person, place, and time. Mental status is at baseline.   Psychiatric:         Mood and Affect: Mood normal.         Behavior: Behavior normal.         Thought Content: Thought content normal.              Result Review  Data Reviewed:{ Labs  Result Review  Imaging  Med Tab  Media :23}   Adult Transthoracic Echo Complete W/ Cont if Necessary Per Protocol (09/09/2022 16:23)  Stress test with myocardial perfusion (09/28/2022 10:32)  Holter Monitor - 72 Hour Up To 15 Days (09/19/2022 10:35)  Holter Monitor - 72 Hour Up To 15 Days (02/28/2024 08:48)             Assessment and Plan {CC Problem List  Visit Diagnosis  ROS  Review (Popup)  Health Maintenance  Quality  BestPractice  Medications  SmartSets  SnapShot Encounters  Media :23}   1. Inappropriate sinus tachycardia  -Due to fatigue, we will try changing metoprolol to nebivolol 5 mg daily  - Continue regular exercise and adequate hydration    2.  Fatigue  -She would like to try changing her metoprolol to Bystolic  -Refer for sleep study  -Possibly secondary to stress as well.  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 Primary hypertension  - Appears controlled  - Continue home monitoring  - Continue losartan 50 mg daily, hydrochlorothiazide 25 mg daily.    4. Chest pain, atypical  -No exertional  chest pain.  Relatively low activity risk.  Normal nuclear stress test in 2022  - Recommend that she try pepcid PRN    5. Sleep disturbances  - Refer to sleep medicine    6. NSVT  - 9 beat run during sleep, no other significant arrhythmias and low PVC burden  - Refer to sleep medicine    7. Bradycardia  - Reported on Apple Watch  -No bradycardia on recent Holter monitor  -Will try changing metoprolol to nebivolol due to associated fatigue        Follow Up {Instructions Charge Capture  Follow-up Communications :23}   Return in about 4 weeks (around 4/4/2024) for Video Visit, tachycardia, .    Patient was given instructions and counseling regarding her condition or for health maintenance advice. Please see specific information pulled into the AVS if appropriate.  Advised to call the Heart and Valve Center with any questions, concerns, or worsening symptoms.    Dictated Utilizing Dragon Dictation

## 2024-03-07 ENCOUNTER — OFFICE VISIT (OUTPATIENT)
Dept: CARDIOLOGY | Facility: HOSPITAL | Age: 29
End: 2024-03-07
Payer: COMMERCIAL

## 2024-03-07 ENCOUNTER — TELEMEDICINE (OUTPATIENT)
Dept: PSYCHIATRY | Facility: CLINIC | Age: 29
End: 2024-03-07
Payer: COMMERCIAL

## 2024-03-07 VITALS
HEIGHT: 63 IN | DIASTOLIC BLOOD PRESSURE: 85 MMHG | HEART RATE: 94 BPM | RESPIRATION RATE: 16 BRPM | SYSTOLIC BLOOD PRESSURE: 128 MMHG | OXYGEN SATURATION: 98 % | TEMPERATURE: 97.3 F | BODY MASS INDEX: 31.18 KG/M2 | WEIGHT: 176 LBS

## 2024-03-07 DIAGNOSIS — R07.89 CHEST PAIN, ATYPICAL: ICD-10-CM

## 2024-03-07 DIAGNOSIS — R00.1 BRADYCARDIA: ICD-10-CM

## 2024-03-07 DIAGNOSIS — G47.9 SLEEP DISTURBANCE: ICD-10-CM

## 2024-03-07 DIAGNOSIS — I47.11 INAPPROPRIATE SINUS TACHYCARDIA: Primary | ICD-10-CM

## 2024-03-07 DIAGNOSIS — F41.1 GENERALIZED ANXIETY DISORDER: Primary | ICD-10-CM

## 2024-03-07 DIAGNOSIS — R53.83 OTHER FATIGUE: ICD-10-CM

## 2024-03-07 DIAGNOSIS — I47.29 NSVT (NONSUSTAINED VENTRICULAR TACHYCARDIA): ICD-10-CM

## 2024-03-07 DIAGNOSIS — F33.42 MDD (MAJOR DEPRESSIVE DISORDER), RECURRENT, IN FULL REMISSION: ICD-10-CM

## 2024-03-07 DIAGNOSIS — I10 PRIMARY HYPERTENSION: ICD-10-CM

## 2024-03-07 RX ORDER — NEBIVOLOL 5 MG/1
5 TABLET ORAL DAILY
Qty: 30 TABLET | Refills: 3 | Status: SHIPPED | OUTPATIENT
Start: 2024-03-07

## 2024-03-07 RX ORDER — METOPROLOL SUCCINATE 25 MG/1
25 TABLET, EXTENDED RELEASE ORAL DAILY
COMMUNITY
End: 2024-03-07
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
## 2024-03-15 ENCOUNTER — TELEMEDICINE (OUTPATIENT)
Dept: PSYCHIATRY | Facility: CLINIC | Age: 29
End: 2024-03-15
Payer: COMMERCIAL

## 2024-03-15 DIAGNOSIS — F41.1 GENERALIZED ANXIETY DISORDER: ICD-10-CM

## 2024-03-15 DIAGNOSIS — F33.2 SEVERE EPISODE OF RECURRENT MAJOR DEPRESSIVE DISORDER, WITHOUT PSYCHOTIC FEATURES: Primary | ICD-10-CM

## 2024-03-15 NOTE — PROGRESS NOTES
"[facility]   Follow-up Progress Note     Date: March 15, 2024  Time In: 10:03  Time Out: 10:41      PROGRESS NOTE  Data:  Elicia Arndt is a 29 y.o. female presenting to [facility] (through [facility]), [address] [place], [address] using a secure MyChart Video Visit through [facility] for assessment with Chas Ramirez LCSW. The patient is seen remotely in their  home  using [facility] My Chart. Patient is being seen via telehealth and stated they are in a secure environment for this session. The patient's condition being diagnosed/treated is appropriate for telemedicine. The provider identified herself as well as her credentials. The patient and/or patients guardian consent to be seen remotely, and when consent is given they understand that the consent allows for patient identifiable information to be sent to a third party as needed. They may refuse to be seen remotely at any time. The electronic data is encrypted and password protected, and the patient has been advised of the potential risks to privacy not withstanding such measures.    Today Patient stated she plans to \"chill and relax at home\" for her birthday today. Patient expressed she is looking forward to eating the cake her daughter baked her to celebrate her birthday. Patient stated she is scheduled for a sleep study to determine if patient's recent health issues are related to sleep apnea. Patient stated she has decided to reinforce boundaries with her father. Patient stated she has observed him engaging in past behaviors of showing up inconsistent in her and her children's lives. Patient stated she does not want an inconsistent role model and her children's lives. Patient expressed she has felt the need to reinforce stronger boundaries within that relationship. Patient stated she feels her father's prioritizes others over his family. Patient " engaged in identifying her boundaries and discussed how she can reinforce her boundaries with consistent behaviors.       Clinical Maneuvering/Intervention:    Chief Complaint: anxiety, depression    (Scales based on 0 - 10 with 10 being the worst)  Depression: 5 Anxiety: 5     Assisted Patient in processing above session content; acknowledged and normalized patient’s thoughts, feelings, and concerns.  Rationalized patient thought process regarding coping with her father's inconsistent behavior.  Discussed triggers associated with patient's depression.  Also discussed coping skills for patient to implement such as maintaining boundaries.    Allowed Patient to freely discuss issues  without interruption or judgement with unconditional positive regard, active listening skills, and empathy. Therapist provided a safe, confidential environment to facilitate the development of a positive therapeutic relationship and encouraged open, honest communication. Assisted Patient in identifying risk factors which would indicate the need for higher level of care including thoughts to harm self or others and/or self-harming behavior and encouraged Patient to contact this office, call 911, or present to the nearest emergency room should any of these events occur. Discussed crisis intervention services and means to access. Patient adamantly and convincingly denies current suicidal or homicidal ideation or perceptual disturbance. Assisted Patient in processing session content; acknowledged and normalized Patient’s thoughts, feelings, and concerns by utilizing a person-centered approach in efforts to build appropriate rapport and a positive therapeutic relationship with open and honest communication. Therapist utilized dialectical behavior techniques to teach and model emotional regulation and relaxation methods. Therapist assisted Patient with identifying and implementing healthier coping strategies.     Assessment   Patient appears to  be experiencing heightened anxiety and depression in response to COVID-19 epidemic  As a result, they can be reasonably expected to continue to benefit from treatment and would likely be at increased risk for decompensation otherwise.    Mental Status Exam:   Hygiene:   good  Cooperation:  Cooperative  Eye Contact:  Good  Psychomotor Behavior:  Appropriate  Affect:  Full range  Mood:  happy  Speech:  Normal  Thought Process:  Goal directed  Thought Content:  Mood congruent  Suicidal:  None  Homicidal:  None  Hallucinations:  None  Delusion:  None  Memory:  Intact  Orientation:  Person, Place, Time, and Situation  Reliability:  good  Insight:  Good  Judgement:  Good  Impulse Control:  Good  Physical/Medical Issues:  No      PHQ-Score Total:  PHQ-9 Total Score:        Patient's Support Network Includes:   and mother    Functional Status: Moderate impairment     Progress toward goal: Not at goal    Prognosis: Good with Ongoing Treatment            Impression/Formulation:    VISIT DIAGNOSIS:     ICD-10-CM ICD-9-CM   1. Severe episode of recurrent major depressive disorder, without psychotic features  F33.2 296.33   2. Generalized anxiety disorder  F41.1 300.02        Patient appeared alert and oriented.  Patient is voluntarily requesting to continue outpatient therapy at [facility].  Patient is receptive to assistance with maintaining a stable lifestyle.  Patient presents with history of anxiety and depression.  Patient is agreeable to attend routine therapy sessions.  Patient expressed desire to maintain stability and participate in the therapeutic process.        Crisis Plan:  Symptoms and/or behaviors to indicate a crisis: Excessive worry or fear and Feeling sad or low    What calming techniques or other strategies will patient use to de-esclate and stay safe: slow down, breathe, visualize calming self, think it though, listen to music, change focus, take a walk    Who is  one person patient can contact to assist with de-escalation? mother    If symptoms/behaviors persist, Patient will present to the nearest hospital for an assessment. Advised patient of [facility] ER and assessment services.     Plan:   Patient will continue in individual outpatient therapy with focus on improved functioning and coping skills, maintaining stability, and avoiding decompensation and the need for higher level of care.    Patient will contact this office ([facility] at [phone]), call 911 or present to the nearest emergency room should suicidal or homicidal ideations occur. Provide Cognitive Behavioral Therapy and Solution Focused Therapy to improve functioning, maintain stability, and avoid decompensation and the need for higher level of care.     Return in about 4 weeks, or earlier if symptoms worsen or fail to improve.    Recommended Referrals: none        This document has been electronically signed by Chas Ramirez LCSW  March 15, 2024 10:03 EDT        Part of this note may be an electronic transcription/translation of spoken language to printed text using the Dragon Dictation System.

## 2024-04-09 NOTE — PROGRESS NOTES
"[facility]  [facility]  Telemedicine Visit      Mode of Visit: Video  Location of patient: home  You have chosen to receive care through a telehealth visit.  Does the patient consent to use a video/audio connection for your medical care today? Yes  The visit included audio and video interaction. No technical issues occurred during this visit.     Chief Complaint  Follow-up, Rapid Heart Rate, and Hypertension    History of Present Illness    Elicia Arndt is a 29 y.o. female with hypertension, anxiety/depression, tachycardia who presents today as a telemedicine follow-up for tachycardia.    Patient wore a Holter monitor in 2022 which showed no arrhythmias but average heart rate was 100. She also had a normal echo and nuclear stress test at the time.  She was started on metoprolol succinate and recently reports noticing heart rates in the 40s at rest on her AppleWatch with associated fatigue.  Her metoprolol was discontinued.  She was placed in another Holter monitor by her primary care provider and average heart rate was 102, minimum heart rate was 66.  She had one 9 beat run of NSVT at 2 AM. She was started back on her metoprolol.     Her Toprol was changed to nebivolol 5 mg daily at last visit due to complaints of fatigue. She is feeling much better. Reports SBPs are 120s at home. 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    Objective     Vital Signs:   Vitals:    04/10/24 0829   BP: 106/66   Pulse: 89   Weight: 80.7 kg (178 lb)   Height: 160 cm (63\")     Body mass index is 31.53 kg/m².    Virtual Visit Physical Exam  Physical Exam  Constitutional:       Appearance: Normal appearance.   HENT:      Head: Normocephalic.   Pulmonary:      Effort: Pulmonary effort is normal. No respiratory distress.   Neurological:      Mental Status: She is alert and oriented to person, place, and time. "   Psychiatric:         Mood and Affect: Mood normal.         Behavior: Behavior normal.         Thought Content: Thought content normal.              Result Review  Data Reviewed:{ Labs  Result Review  Imaging  Med Tab  Media :23}   ECG 12 Lead (03/06/2024 07:31)  Holter Monitor - 72 Hour Up To 15 Days (02/28/2024 08:48)  Stress test with myocardial perfusion (09/28/2022 10:32)  Adult Transthoracic Echo Complete W/ Cont if Necessary Per Protocol (09/09/2022 16:23)  Holter Monitor - 72 Hour Up To 15 Days (09/19/2022 10:35)             Assessment and Plan {CC Problem List  Visit Diagnosis  ROS  Review (Popup)  Health Maintenance  Quality  BestPractice  Medications  SmartSets  SnapShot Encounters  Media :23}   1. Inappropriate sinus tachycardia  - Rates controlled and asymptomatic. Fatigue significantly improve off metoprolol  - Continue bystolic 5mg daily    2. Primary hypertension  - Appears controlled. If remains well controlled could consider consolidating BP regimen by increasing bystolic and potentially discontinuing losartan in the future. For now, will continue since she does note some elevated BPs at work        Follow Up {Instructions Charge Capture  Follow-up Communications :23}   Return if symptoms worsen or fail to improve.    Patient was given instructions and counseling regarding her condition or for health maintenance advice. Please see specific information pulled into the AVS if appropriate.  Advised to call the Heart and Valve Center with any questions, concerns, or worsening symptoms.    Dictated Utilizing Dragon Dictation

## 2024-04-10 ENCOUNTER — TELEMEDICINE (OUTPATIENT)
Dept: CARDIOLOGY | Facility: HOSPITAL | Age: 29
End: 2024-04-10
Payer: COMMERCIAL

## 2024-04-10 VITALS
WEIGHT: 178 LBS | SYSTOLIC BLOOD PRESSURE: 106 MMHG | DIASTOLIC BLOOD PRESSURE: 66 MMHG | HEIGHT: 63 IN | HEART RATE: 89 BPM | BODY MASS INDEX: 31.54 KG/M2

## 2024-04-10 DIAGNOSIS — I47.11 INAPPROPRIATE SINUS TACHYCARDIA: Primary | ICD-10-CM

## 2024-04-10 DIAGNOSIS — I10 PRIMARY HYPERTENSION: ICD-10-CM

## 2024-04-12 ENCOUNTER — TELEMEDICINE (OUTPATIENT)
Dept: PSYCHIATRY | Facility: CLINIC | Age: 29
End: 2024-04-12
Payer: COMMERCIAL

## 2024-04-12 DIAGNOSIS — F33.2 SEVERE EPISODE OF RECURRENT MAJOR DEPRESSIVE DISORDER, WITHOUT PSYCHOTIC FEATURES: Primary | ICD-10-CM

## 2024-04-12 DIAGNOSIS — F41.1 GENERALIZED ANXIETY DISORDER: ICD-10-CM

## 2024-04-12 NOTE — PROGRESS NOTES
[facility]   Follow-up Progress Note     Date: April 12, 2024  Time In: 11:11  Time Out: 11:56      PROGRESS NOTE  Data:  Elicia Arndt is a 29 y.o. female presenting to [facility] (through [facility]), [address] [place], [address] using a secure MyChart Video Visit through [facility] for assessment with Chas Ramirez LCSW. The patient is seen remotely in their  home  using [facility] My Chart. Patient is being seen via telehealth and stated they are in a secure environment for this session. The patient's condition being diagnosed/treated is appropriate for telemedicine. The provider identified herself as well as her credentials. The patient and/or patients guardian consent to be seen remotely, and when consent is given they understand that the consent allows for patient identifiable information to be sent to a third party as needed. They may refuse to be seen remotely at any time. The electronic data is encrypted and password protected, and the patient has been advised of the potential risks to privacy not withstanding such measures.    Today Patient stated both of daughters have been home sick with the flu. Patient stated their fevers lasted for a week. Patient expressed it has been a challenge caring for them both due to how long they have been sick. Patient stated she is continuing to feel frustration in her familial relationship, specifically with her sister. Patient stated her sister only calls when she wants something. Patient stated she doesn't typically give her sister the money she asks for but has occasionally given in. Patient stated her mother has been encouraging of patient to improve her closeness to her sister. Processed with patient her frustration. Discussed with patient her responsibility to fixing her relationship with her sister for her mother. Patient also expressed concern on how to  address her daughter being bullied in school. Engaged patient in strategies to support her daughter and how she can take act steps to also address the bullying with her daughter's school.       Clinical Maneuvering/Intervention:    Chief Complaint: anxiety, depression    (Scales based on 0 - 10 with 10 being the worst)  Depression: 3 Anxiety: 7/8     Assisted Patient in processing above session content; acknowledged and normalized patient’s thoughts, feelings, and concerns.  Rationalized patient thought process regarding familial conflict and addressing her daughter experiencing bullying at school.  Discussed triggers associated with patient's anxiety.  Also discussed coping skills for patient to implement such as focusing on her responsibility to other's emotions.    Allowed Patient to freely discuss issues  without interruption or judgement with unconditional positive regard, active listening skills, and empathy. Therapist provided a safe, confidential environment to facilitate the development of a positive therapeutic relationship and encouraged open, honest communication. Assisted Patient in identifying risk factors which would indicate the need for higher level of care including thoughts to harm self or others and/or self-harming behavior and encouraged Patient to contact this office, call 911, or present to the nearest emergency room should any of these events occur. Discussed crisis intervention services and means to access. Patient adamantly and convincingly denies current suicidal or homicidal ideation or perceptual disturbance. Assisted Patient in processing session content; acknowledged and normalized Patient’s thoughts, feelings, and concerns by utilizing a person-centered approach in efforts to build appropriate rapport and a positive therapeutic relationship with open and honest communication. Therapist utilized dialectical behavior techniques to teach and model emotional regulation and relaxation  methods. Therapist assisted Patient with identifying and implementing healthier coping strategies.     Assessment   Patient appears to be experiencing heightened anxiety and depression in response to COVID-19 epidemic  As a result, they can be reasonably expected to continue to benefit from treatment and would likely be at increased risk for decompensation otherwise.    Mental Status Exam:   Hygiene:   good  Cooperation:  Cooperative  Eye Contact:  Good  Psychomotor Behavior:  Appropriate  Affect:  Full range  Mood:  calm  Speech:  Normal  Thought Process:  Goal directed  Thought Content:  Mood congruent  Suicidal:  None  Homicidal:  None  Hallucinations:  None  Delusion:  None  Memory:  Intact  Orientation:  Person, Place, Time, and Situation  Reliability:  good  Insight:  Good  Judgement:  Good  Impulse Control:  Good  Physical/Medical Issues:  No      PHQ-Score Total:  PHQ-9 Total Score:        Patient's Support Network Includes:   and mother    Functional Status: Moderate impairment     Progress toward goal: Not at goal    Prognosis: Good with Ongoing Treatment            Impression/Formulation:    VISIT DIAGNOSIS:     ICD-10-CM ICD-9-CM   1. Severe episode of recurrent major depressive disorder, without psychotic features  F33.2 296.33   2. Generalized anxiety disorder  F41.1 300.02        Patient appeared alert and oriented.  Patient is voluntarily requesting to continue outpatient therapy at [facility].  Patient is receptive to assistance with maintaining a stable lifestyle.  Patient presents with history of anxiety and depression.  Patient is agreeable to attend routine therapy sessions.  Patient expressed desire to maintain stability and participate in the therapeutic process.        Crisis Plan:  Symptoms and/or behaviors to indicate a crisis: Excessive worry or fear and Feeling sad or low    What calming techniques or other strategies will patient use to  de-esclate and stay safe: slow down, breathe, visualize calming self, think it though, listen to music, change focus, take a walk    Who is one person patient can contact to assist with de-escalation? mother    If symptoms/behaviors persist, Patient will present to the nearest hospital for an assessment. Advised patient of [facility] ER and assessment services.     Plan:   Patient will continue in individual outpatient therapy with focus on improved functioning and coping skills, maintaining stability, and avoiding decompensation and the need for higher level of care.    Patient will contact this office ([facility] at [phone]), call 911 or present to the nearest emergency room should suicidal or homicidal ideations occur. Provide Cognitive Behavioral Therapy and Solution Focused Therapy to improve functioning, maintain stability, and avoid decompensation and the need for higher level of care.     Return in about 4 weeks, or earlier if symptoms worsen or fail to improve.    Recommended Referrals: none        This document has been electronically signed by Chas Ramirez LCSW  April 12, 2024 11:11 EDT        Part of this note may be an electronic transcription/translation of spoken language to printed text using the Dragon Dictation System.

## 2024-04-24 ENCOUNTER — OFFICE VISIT (OUTPATIENT)
Dept: INTERNAL MEDICINE | Facility: CLINIC | Age: 29
End: 2024-04-24
Payer: COMMERCIAL

## 2024-04-24 VITALS
HEIGHT: 64 IN | SYSTOLIC BLOOD PRESSURE: 118 MMHG | WEIGHT: 174.6 LBS | TEMPERATURE: 97.5 F | DIASTOLIC BLOOD PRESSURE: 74 MMHG | BODY MASS INDEX: 29.81 KG/M2 | HEART RATE: 84 BPM | RESPIRATION RATE: 16 BRPM

## 2024-04-24 DIAGNOSIS — Z00.00 ENCOUNTER FOR WELLNESS EXAMINATION: Primary | ICD-10-CM

## 2024-04-24 DIAGNOSIS — I49.5 TACHYCARDIA-BRADYCARDIA: ICD-10-CM

## 2024-04-24 DIAGNOSIS — I10 HYPERTENSION, UNSPECIFIED TYPE: ICD-10-CM

## 2024-04-24 DIAGNOSIS — R22.41 MASS OF RIGHT LOWER EXTREMITY: ICD-10-CM

## 2024-04-24 DIAGNOSIS — R73.9 HYPERGLYCEMIA: ICD-10-CM

## 2024-04-24 DIAGNOSIS — Z13.6 ENCOUNTER FOR LIPID SCREENING FOR CARDIOVASCULAR DISEASE: ICD-10-CM

## 2024-04-24 DIAGNOSIS — Z13.220 ENCOUNTER FOR LIPID SCREENING FOR CARDIOVASCULAR DISEASE: ICD-10-CM

## 2024-04-24 LAB
ALBUMIN SERPL-MCNC: 4.6 G/DL (ref 3.5–5.2)
ALBUMIN/GLOB SERPL: 1.8 G/DL
ALP SERPL-CCNC: 89 U/L (ref 39–117)
ALT SERPL W P-5'-P-CCNC: 33 U/L (ref 1–33)
ANION GAP SERPL CALCULATED.3IONS-SCNC: 11 MMOL/L (ref 5–15)
AST SERPL-CCNC: 19 U/L (ref 1–32)
BASOPHILS # BLD AUTO: 0.06 10*3/MM3 (ref 0–0.2)
BASOPHILS NFR BLD AUTO: 0.7 % (ref 0–1.5)
BILIRUB SERPL-MCNC: 0.3 MG/DL (ref 0–1.2)
BUN SERPL-MCNC: 11 MG/DL (ref 6–20)
BUN/CREAT SERPL: 13.4 (ref 7–25)
CALCIUM SPEC-SCNC: 9.5 MG/DL (ref 8.6–10.5)
CHLORIDE SERPL-SCNC: 103 MMOL/L (ref 98–107)
CHOLEST SERPL-MCNC: 162 MG/DL (ref 0–200)
CO2 SERPL-SCNC: 25 MMOL/L (ref 22–29)
CREAT SERPL-MCNC: 0.82 MG/DL (ref 0.57–1)
DEPRECATED RDW RBC AUTO: 38.8 FL (ref 37–54)
EGFRCR SERPLBLD CKD-EPI 2021: 99.4 ML/MIN/1.73
EOSINOPHIL # BLD AUTO: 0.1 10*3/MM3 (ref 0–0.4)
EOSINOPHIL NFR BLD AUTO: 1.2 % (ref 0.3–6.2)
ERYTHROCYTE [DISTWIDTH] IN BLOOD BY AUTOMATED COUNT: 11.7 % (ref 12.3–15.4)
EXPIRATION DATE: NORMAL
GLOBULIN UR ELPH-MCNC: 2.5 GM/DL
GLUCOSE SERPL-MCNC: 77 MG/DL (ref 65–99)
HBA1C MFR BLD: 5.1 % (ref 4.5–5.7)
HCT VFR BLD AUTO: 42.6 % (ref 34–46.6)
HDLC SERPL-MCNC: 36 MG/DL (ref 40–60)
HGB BLD-MCNC: 14.2 G/DL (ref 12–15.9)
IMM GRANULOCYTES # BLD AUTO: 0.03 10*3/MM3 (ref 0–0.05)
IMM GRANULOCYTES NFR BLD AUTO: 0.4 % (ref 0–0.5)
LDLC SERPL CALC-MCNC: 105 MG/DL (ref 0–100)
LDLC/HDLC SERPL: 2.85 {RATIO}
LYMPHOCYTES # BLD AUTO: 2.43 10*3/MM3 (ref 0.7–3.1)
LYMPHOCYTES NFR BLD AUTO: 28.6 % (ref 19.6–45.3)
Lab: NORMAL
MCH RBC QN AUTO: 30.4 PG (ref 26.6–33)
MCHC RBC AUTO-ENTMCNC: 33.3 G/DL (ref 31.5–35.7)
MCV RBC AUTO: 91.2 FL (ref 79–97)
MONOCYTES # BLD AUTO: 0.67 10*3/MM3 (ref 0.1–0.9)
MONOCYTES NFR BLD AUTO: 7.9 % (ref 5–12)
NEUTROPHILS NFR BLD AUTO: 5.22 10*3/MM3 (ref 1.7–7)
NEUTROPHILS NFR BLD AUTO: 61.2 % (ref 42.7–76)
NRBC BLD AUTO-RTO: 0 /100 WBC (ref 0–0.2)
PLATELET # BLD AUTO: 267 10*3/MM3 (ref 140–450)
PMV BLD AUTO: 10.7 FL (ref 6–12)
POTASSIUM SERPL-SCNC: 3.8 MMOL/L (ref 3.5–5.2)
PROT SERPL-MCNC: 7.1 G/DL (ref 6–8.5)
RBC # BLD AUTO: 4.67 10*6/MM3 (ref 3.77–5.28)
SODIUM SERPL-SCNC: 139 MMOL/L (ref 136–145)
TRIGL SERPL-MCNC: 117 MG/DL (ref 0–150)
TSH SERPL DL<=0.05 MIU/L-ACNC: 2.22 UIU/ML (ref 0.27–4.2)
VLDLC SERPL-MCNC: 21 MG/DL (ref 5–40)
WBC NRBC COR # BLD AUTO: 8.51 10*3/MM3 (ref 3.4–10.8)

## 2024-04-24 PROCEDURE — 36415 COLL VENOUS BLD VENIPUNCTURE: CPT | Performed by: NURSE PRACTITIONER

## 2024-04-24 PROCEDURE — 83036 HEMOGLOBIN GLYCOSYLATED A1C: CPT | Performed by: NURSE PRACTITIONER

## 2024-04-24 PROCEDURE — 80061 LIPID PANEL: CPT | Performed by: NURSE PRACTITIONER

## 2024-04-24 PROCEDURE — 80050 GENERAL HEALTH PANEL: CPT | Performed by: NURSE PRACTITIONER

## 2024-04-24 PROCEDURE — 99395 PREV VISIT EST AGE 18-39: CPT | Performed by: NURSE PRACTITIONER

## 2024-04-24 RX ORDER — CARIPRAZINE 3 MG/1
CAPSULE, GELATIN COATED ORAL
COMMUNITY
Start: 2024-03-30

## 2024-04-24 NOTE — PROGRESS NOTES
Female Physical Note      Patient Name: Elicia Arndt  : 1995   MRN: [id number]     Chief Complaint:    Chief Complaint   Patient presents with    Annual Exam       History of Present Illness: Elicia Arndt is a 29 y.o. female who is here today for their annual health maintenance and physical.  History of Present Illness  The patient presents for a physical exam.    The patient has been experiencing depressive symptoms for the past 1 to 2 months, characterized by suicidal ideation but no specific plan or intent. She is currently under the care of a behavioral health specialist and has an upcoming appointment scheduled for 2024. She is also engaged in counseling with Rashid Cee. Her sleep patterns are normal. She is currently on a regimen of bupropion 150 mg and 450 mg.    The patient reports a non-tender, hard spot on her left lower leg that has increased in size over the past year. She also mentions the presence of moles.    Supplemental Information  She has not visited her dental and eye exam this year, but had them last year.     She recently saw cardiology for tachycardia and is doing well on Bystolic 5 mg daily and losartan. She denies chest pain, shortness of breath, or palpitations.   She had a Pap smear done in 2023.   She has 2 children.   She uses Nexplanon for birth control. She does not have her periods. She denies new breast lumps. She does her breast exams.   She walks and hikes for exercise.  She is not aware of any trouble with vitamin D. Her energy has been okay.       Her maternal grandmother had ovarian cancer. Her grandfather had colon cancer in his 50s.  PHQ-9 Depression Screening  Little interest or pleasure in doing things? 3-->nearly every day   Feeling down, depressed, or hopeless? 3-->nearly every day   Trouble falling or staying asleep, or sleeping too much? 0-->not at all   Feeling tired or having little energy? 0-->not at all   Poor appetite or overeating?  3-->nearly every day   Feeling bad about yourself - or that you are a failure or have let yourself or your family down? 3-->nearly every day   Trouble concentrating on things, such as reading the newspaper or watching television? 3-->nearly every day   Moving or speaking so slowly that other people could have noticed? Or the opposite - being so fidgety or restless that you have been moving around a lot more than usual? 3-->nearly every day   Thoughts that you would be better off dead, or of hurting yourself in some way? 1-->several days   PHQ-9 Total Score 19   If you checked off any problems, how difficult have these problems made it for you to do your work, take care of things at home, or get along with other people? somewhat difficult           Subjective     Review of System: Review of Systems   Constitutional:  Negative for activity change, appetite change, chills, fatigue, fever and unexpected weight change.   HENT:  Negative for congestion, ear pain, postnasal drip, rhinorrhea, sinus pressure, sinus pain, sneezing and sore throat.    Eyes:  Negative for visual disturbance.   Respiratory:  Negative for cough, shortness of breath and wheezing.    Cardiovascular:  Negative for chest pain and palpitations.   Gastrointestinal:  Negative for abdominal pain, blood in stool, constipation, diarrhea, nausea and vomiting.   Genitourinary:  Negative for dysuria.   Musculoskeletal:  Negative for arthralgias, joint swelling and myalgias.   Skin:  Negative for rash.        Leg lump   Neurological:  Negative for dizziness, weakness and headaches.   Hematological:  Negative for adenopathy.   Psychiatric/Behavioral:  Positive for dysphoric mood. The patient is not nervous/anxious.           Past Medical History:   Past Medical History:   Diagnosis Date    Anxiety and depression     Hypertension     Rotator cuff tendinitis, right 2010    MRI and Ortho and PT 2019       Past Surgical History:   Past Surgical History:   Procedure  Laterality Date    NO PAST SURGERIES      WISDOM TOOTH EXTRACTION         Family History:   Family History   Problem Relation Age of Onset    Hyperlipidemia Mother     Alcohol abuse Father     Anxiety disorder Father     Diabetes Father     Other Father         Addand adhd    COPD Father     Depression Father     Asthma Sister     Hypertension Sister     Endometriosis Sister     Anxiety disorder Sister     Anxiety disorder Brother     Depression Brother     ADD / ADHD Brother     Heart disease Maternal Grandmother     COPD Maternal Grandmother     Ovarian cancer Maternal Grandmother     Heart attack Maternal Grandfather         40    Hypertension Maternal Grandfather     Stroke Maternal Grandfather     Anxiety disorder Maternal Grandfather     Depression Paternal Grandfather     Cancer Paternal Grandfather        Social History:   Social History     Socioeconomic History    Marital status:    Tobacco Use    Smoking status: Every Day     Current packs/day: 0.50     Average packs/day: 0.5 packs/day for 5.0 years (2.5 ttl pk-yrs)     Types: Cigarettes, Electronic Cigarette     Passive exposure: Never    Smokeless tobacco: Never   Vaping Use    Vaping status: Some Days    Substances: Nicotine, Flavoring    Devices: Disposable, Pre-filled pod    Passive vaping exposure: Yes   Substance and Sexual Activity    Alcohol use: Yes     Alcohol/week: 3.0 standard drinks of alcohol     Types: 3 Shots of liquor per week    Drug use: No    Sexual activity: Yes     Partners: Male     Birth control/protection: Nexplanon     Comment: Nexplanon inserted 10/26/22       Medications:     Current Outpatient Medications:     buPROPion XL (Wellbutrin XL) 150 MG 24 hr tablet, Take 3 tablets by mouth Every Morning for 30 days., Disp: 30 tablet, Rfl: 2    hydroCHLOROthiazide (HYDRODIURIL) 25 MG tablet, Take 1 tablet by mouth Daily., Disp: 90 tablet, Rfl: 0    hydrOXYzine (ATARAX) 25 MG tablet, Take 1/2 to 1 tablet by mouth every night  "for sleep/ anxiety., Disp: 30 tablet, Rfl: 2    losartan (COZAAR) 50 MG tablet, Take 1 tablet by mouth Daily., Disp: 90 tablet, Rfl: 0    nebivolol (Bystolic) 5 MG tablet, Take 1 tablet by mouth Daily., Disp: 30 tablet, Rfl: 3    traZODone (DESYREL) 50 MG tablet, TAKE ONE TO TWO TABLETS BY MOUTH EVERY NIGHT FOR 90 DAYS, Disp: 180 tablet, Rfl: 0    Vraylar 3 MG capsule capsule, , Disp: , Rfl:     Allergies:   Allergies   Allergen Reactions    Adhesive Tape Hives     Red and hives         Immunizations  Colorectal Screening:     Last Completed Colonoscopy       This patient has no relevant Health Maintenance data.           Pap:    Last Completed Pap Smear            PAP SMEAR (Every 3 Years) Next due on 9/29/2024 09/29/2021  Done - Dr Garcia                   Mammogram:    Last Completed Mammogram       This patient has no relevant Health Maintenance data.              Depression: PHQ-2/9 Depression Screening  Little interest or pleasure in doing things?     Feeling down, depressed, or hopeless?     PHQ-2 Total Score     PHQ-9 Total Score 19     Objective     Physical Exam:  Vital Signs:   Vitals:    04/24/24 0805   BP: 118/74   BP Location: Right arm   Patient Position: Sitting   Cuff Size: Adult   Pulse: 84   Resp: 16   Temp: 97.5 °F (36.4 °C)   TempSrc: Infrared   Weight: 79.2 kg (174 lb 9.6 oz)   Height: 162.6 cm (64\")   PainSc: 0-No pain     Body mass index is 29.97 kg/m².  Information provided on after visit summary.        Physical Exam  Vitals and nursing note reviewed.   Constitutional:       General: She is not in acute distress.     Appearance: Normal appearance. She is not ill-appearing.   HENT:      Head: Normocephalic.      Right Ear: Tympanic membrane, ear canal and external ear normal. There is no impacted cerumen.      Left Ear: Tympanic membrane, ear canal and external ear normal. There is no impacted cerumen.      Nose: No nasal tenderness or rhinorrhea.      Mouth/Throat:      Mouth: Mucous " membranes are moist.      Pharynx: Oropharynx is clear. No oropharyngeal exudate or posterior oropharyngeal erythema.   Eyes:      General:         Right eye: No discharge.         Left eye: No discharge.      Extraocular Movements: Extraocular movements intact.      Conjunctiva/sclera: Conjunctivae normal.      Pupils: Pupils are equal, round, and reactive to light.   Neck:      Thyroid: No thyromegaly.      Vascular: No carotid bruit.   Cardiovascular:      Rate and Rhythm: Normal rate and regular rhythm.      Pulses: Normal pulses.      Heart sounds: Normal heart sounds. No murmur heard.     No gallop.   Pulmonary:      Effort: Pulmonary effort is normal.      Breath sounds: Normal breath sounds. No wheezing, rhonchi or rales.   Abdominal:      General: Bowel sounds are normal.      Palpations: Abdomen is soft. There is no mass.      Tenderness: There is no abdominal tenderness. There is no right CVA tenderness or left CVA tenderness.   Musculoskeletal:         General: No swelling or tenderness. Normal range of motion.      Cervical back: Normal range of motion.      Right lower leg: No edema.      Left lower leg: No edema.   Lymphadenopathy:      Cervical: No cervical adenopathy.   Skin:     General: Skin is warm and dry.      Capillary Refill: Capillary refill takes less than 2 seconds.      Findings: No erythema or rash.   Neurological:      General: No focal deficit present.      Mental Status: She is alert and oriented to person, place, and time.      Motor: No weakness.   Psychiatric:         Mood and Affect: Mood normal.         Behavior: Behavior is cooperative.         Cognition and Memory: She does not exhibit impaired recent memory.   Physical Exam  There is a 1 cm mass on the right lower outer leg, which appears to be a cyst or possible lipoma.    Procedures    Assessment / Plan      Assessment/Plan:   Diagnoses and all orders for this visit:    1. Encounter for wellness examination (Primary)    2.  Encounter for lipid screening for cardiovascular disease  -     Lipid Panel; Future    3. Hypertension, unspecified type  -     Comprehensive Metabolic Panel; Future  -     CBC & Differential; Future  -     TSH Rfx On Abnormal To Free T4; Future    4. Tachycardia-bradycardia    5. Hyperglycemia  -     POC Glycosylated Hemoglobin (Hb A1C)    6. Mass of right lower extremity  -     US Nonvascular Extremity Limited; Future       Assessment & Plan  1. Depression.  The patient's PHQ-9 score was recorded at 19, indicating severe depression. Should her symptoms exacerbate, she is to inform us or her behavioral health specialist, particularly if she experiences suicidal ideation. She will follow-up with her behavioral health and continue therapy.     2. Physical examination.  The patient's blood pressure reading today was within the target range, less than 130/80. Her previous recorded blood glucose level was 101. Her vitamin D levels were within the normal range last year. Her vaccinations are current. Her thyroid with reflex will be checked. Her vitamin D levels were slightly low. She is advised to increase her physical activity and apply sunscreen regularly. She is also advised to wear a seatbelt and ensure adequate hydration. An A1c test will be conducted.    3. Mass on the right lower outer leg.  The mass appears to be a cyst or possible lipoma. An ultrasound of the right lower outer leg will be ordered. Should the mass continue to increase in size, it may be necessary to remove it.    Follow-up  The patient is scheduled for a follow-up visit in 6 months.      Follow Up:   Return in about 6 months (around 10/24/2024) for Recheck.    Patient or patient representative verbalized consent for the use of Ambient Listening during the visit with  ECTOR Alvarado for chart documentation. 4/24/2024  08:40 EDT      Health Maintenance, Female  Adopting a healthy lifestyle and getting preventive care can go a long way to  promote health and wellness. Talk with your health care provider about what schedule of regular examinations is right for you. This is a good chance for you to check in with your provider about disease prevention and staying healthy.  In between checkups, there are plenty of things you can do on your own. Experts have done a lot of research about which lifestyle changes and preventive measures are most likely to keep you healthy. Ask your health care provider for more information.  Weight and diet  Eat a healthy diet  Be sure to include plenty of vegetables, fruits, low-fat dairy products, and lean protein.  Do not eat a lot of foods high in solid fats, added sugars, or salt.  Get regular exercise. This is one of the most important things you can do for your health.  Most adults should exercise for at least 150 minutes each week. The exercise should increase your heart rate and make you sweat (moderate-intensity exercise).  Most adults should also do strengthening exercises at least twice a week. This is in addition to the moderate-intensity exercise.     Maintain a healthy weight  Body mass index (BMI) is a measurement that can be used to identify possible weight problems. It estimates body fat based on height and weight. Your health care provider can help determine your BMI and help you achieve or maintain a healthy weight.  For females 20 years of age and older:  A BMI below 18.5 is considered underweight.  A BMI of 18.5 to 24.9 is normal.  A BMI of 25 to 29.9 is considered overweight.  A BMI of 30 and above is considered obese.     Watch levels of cholesterol and blood lipids  You should start having your blood tested for lipids and cholesterol at 20 years of age, then have this test every 5 years.  You may need to have your cholesterol levels checked more often if:  Your lipid or cholesterol levels are high.  You are older than 50 years of age.  You are at high risk for heart disease.     Cancer screening  Lung  Cancer  Lung cancer screening is recommended for adults 55-80 years old who are at high risk for lung cancer because of a history of smoking.  A yearly low-dose CT scan of the lungs is recommended for people who:  Currently smoke.  Have quit within the past 15 years.  Have at least a 30-pack-year history of smoking. A pack year is smoking an average of one pack of cigarettes a day for 1 year.  Yearly screening should continue until it has been 15 years since you quit.  Yearly screening should stop if you develop a health problem that would prevent you from having lung cancer treatment.     Breast Cancer  Practice breast self-awareness. This means understanding how your breasts normally appear and feel.  It also means doing regular breast self-exams. Let your health care provider know about any changes, no matter how small.  If you are in your 20s or 30s, you should have a clinical breast exam (CBE) by a health care provider every 1-3 years as part of a regular health exam.  If you are 40 or older, have a CBE every year. Also consider having a breast X-ray (mammogram) every year.  If you have a family history of breast cancer, talk to your health care provider about genetic screening.  If you are at high risk for breast cancer, talk to your health care provider about having an MRI and a mammogram every year.  Breast cancer gene (BRCA) assessment is recommended for women who have family members with BRCA-related cancers. BRCA-related cancers include:  Breast.  Ovarian.  Tubal.  Peritoneal cancers.  Results of the assessment will determine the need for genetic counseling and BRCA1 and BRCA2 testing.     Cervical Cancer  Your health care provider may recommend that you be screened regularly for cancer of the pelvic organs (ovaries, uterus, and vagina). This screening involves a pelvic examination, including checking for microscopic changes to the surface of your cervix (Pap test). You may be encouraged to have this  screening done every 3 years, beginning at age 21.  For women ages 30-65, health care providers may recommend pelvic exams and Pap testing every 3 years, or they may recommend the Pap and pelvic exam, combined with testing for human papilloma virus (HPV), every 5 years. Some types of HPV increase your risk of cervical cancer. Testing for HPV may also be done on women of any age with unclear Pap test results.  Other health care providers may not recommend any screening for nonpregnant women who are considered low risk for pelvic cancer and who do not have symptoms. Ask your health care provider if a screening pelvic exam is right for you.  If you have had past treatment for cervical cancer or a condition that could lead to cancer, you need Pap tests and screening for cancer for at least 20 years after your treatment. If Pap tests have been discontinued, your risk factors (such as having a new sexual partner) need to be reassessed to determine if screening should resume. Some women have medical problems that increase the chance of getting cervical cancer. In these cases, your health care provider may recommend more frequent screening and Pap tests.     Colorectal Cancer  This type of cancer can be detected and often prevented.  Routine colorectal cancer screening usually begins at 50 years of age and continues through 75 years of age.  Your health care provider may recommend screening at an earlier age if you have risk factors for colon cancer.  Your health care provider may also recommend using home test kits to check for hidden blood in the stool.  A small camera at the end of a tube can be used to examine your colon directly (sigmoidoscopy or colonoscopy). This is done to check for the earliest forms of colorectal cancer.  Routine screening usually begins at age 50.  Direct examination of the colon should be repeated every 5-10 years through 75 years of age. However, you may need to be screened more often if early  forms of precancerous polyps or small growths are found.     Skin Cancer  Check your skin from head to toe regularly.  Tell your health care provider about any new moles or changes in moles, especially if there is a change in a mole's shape or color.  Also tell your health care provider if you have a mole that is larger than the size of a pencil eraser.  Always use sunscreen. Apply sunscreen liberally and repeatedly throughout the day.  Protect yourself by wearing long sleeves, pants, a wide-brimmed hat, and sunglasses whenever you are outside.     Heart disease, diabetes, and high blood pressure  High blood pressure causes heart disease and increases the risk of stroke. High blood pressure is more likely to develop in:  People who have blood pressure in the high end of the normal range (130-139/85-89 mm Hg).  People who are overweight or obese.  People who are .  If you are 18-39 years of age, have your blood pressure checked every 3-5 years. If you are 40 years of age or older, have your blood pressure checked every year. You should have your blood pressure measured twice--once when you are at a hospital or clinic, and once when you are not at a hospital or clinic. Record the average of the two measurements. To check your blood pressure when you are not at a hospital or clinic, you can use:  An automated blood pressure machine at a pharmacy.  A home blood pressure monitor.  If you are between 55 years and 79 years old, ask your health care provider if you should take aspirin to prevent strokes.  Have regular diabetes screenings. This involves taking a blood sample to check your fasting blood sugar level.  If you are at a normal weight and have a low risk for diabetes, have this test once every three years after 45 years of age.  If you are overweight and have a high risk for diabetes, consider being tested at a younger age or more often.  Preventing infection  Hepatitis B  If you have a higher risk  for hepatitis B, you should be screened for this virus. You are considered at high risk for hepatitis B if:  You were born in a country where hepatitis B is common. Ask your health care provider which countries are considered high risk.  Your parents were born in a high-risk country, and you have not been immunized against hepatitis B (hepatitis B vaccine).  You have HIV or AIDS.  You use needles to inject street drugs.  You live with someone who has hepatitis B.  You have had sex with someone who has hepatitis B.  You get hemodialysis treatment.  You take certain medicines for conditions, including cancer, organ transplantation, and autoimmune conditions.     Hepatitis C  Blood testing is recommended for:  Everyone born from 1945 through 1965.  Anyone with known risk factors for hepatitis C.     Sexually transmitted infections (STIs)  You should be screened for sexually transmitted infections (STIs) including gonorrhea and chlamydia if:  You are sexually active and are younger than 24 years of age.  You are older than 24 years of age and your health care provider tells you that you are at risk for this type of infection.  Your sexual activity has changed since you were last screened and you are at an increased risk for chlamydia or gonorrhea. Ask your health care provider if you are at risk.  If you do not have HIV, but are at risk, it may be recommended that you take a prescription medicine daily to prevent HIV infection. This is called pre-exposure prophylaxis (PrEP). You are considered at risk if:  You are sexually active and do not regularly use condoms or know the HIV status of your partner(s).  You take drugs by injection.  You are sexually active with a partner who has HIV.     Talk with your health care provider about whether you are at high risk of being infected with HIV. If you choose to begin PrEP, you should first be tested for HIV. You should then be tested every 3 months for as long as you are taking  PrEP.  Pregnancy  If you are premenopausal and you may become pregnant, ask your health care provider about preconception counseling.  If you may become pregnant, take 400 to 800 micrograms (mcg) of folic acid every day.  If you want to prevent pregnancy, talk to your health care provider about birth control (contraception).  Osteoporosis and menopause  Osteoporosis is a disease in which the bones lose minerals and strength with aging. This can result in serious bone fractures. Your risk for osteoporosis can be identified using a bone density scan.  If you are 65 years of age or older, or if you are at risk for osteoporosis and fractures, ask your health care provider if you should be screened.  Ask your health care provider whether you should take a calcium or vitamin D supplement to lower your risk for osteoporosis.  Menopause may have certain physical symptoms and risks.  Hormone replacement therapy may reduce some of these symptoms and risks.  Talk to your health care provider about whether hormone replacement therapy is right for you.  Follow these instructions at home:  Schedule regular health, dental, and eye exams.  Stay current with your immunizations.  Do not use any tobacco products including cigarettes, chewing tobacco, or electronic cigarettes.  If you are pregnant, do not drink alcohol.  If you are breastfeeding, limit how much and how often you drink alcohol.  Limit alcohol intake to no more than 1 drink per day for nonpregnant women. One drink equals 12 ounces of beer, 5 ounces of wine, or 1½ ounces of hard liquor.  Do not use street drugs.  Do not share needles.  Ask your health care provider for help if you need support or information about quitting drugs.  Tell your health care provider if you often feel depressed.  Tell your health care provider if you have ever been abused or do not feel safe at home.  This information is not intended to replace advice given to you by your health care provider.  Make sure you discuss any questions you have with your health care provider.  Document Released: 07/02/2012 Document Revised: 05/25/2017 Document Reviewed: 09/20/2016  [employer] Interactive Patient Education © 2018 [employer]   Elicia Arndt voices understanding and acceptance of this advice and will call back with any further questions or concerns. AVS with preventive healthcare tips printed for patient.     Arina Venegas, APRN

## 2024-04-24 NOTE — PATIENT INSTRUCTIONS
MyPlate from USDA  MyPlate is an outline of a general healthy diet based on the Dietary Guidelines for Americans, 1529-7026, from the U.S. Department of Agriculture (USDA). It sets guidelines for how much food you should eat from each food group based on your age, sex, and level of physical activity.  What are tips for following MyPlate?  To follow MyPlate recommendations:  Eat a wide variety of fruits and vegetables, grains, and protein foods.  Serve smaller portions and eat less food throughout the day.  Limit portion sizes to avoid overeating.  Enjoy your food.  Get at least 150 minutes of exercise every week. This is about 30 minutes each day, 5 or more days per week.  It can be difficult to have every meal look like MyPlate. Think about MyPlate as eating guidelines for an entire day, rather than each individual meal.  Fruits and vegetables  Make one half of your plate fruits and vegetables.  Eat many different colors of fruits and vegetables each day.  For a 2,000-calorie daily food plan, eat:  2½ cups of vegetables every day.  2 cups of fruit every day.  1 cup is equal to:  1 cup raw or cooked vegetables.  1 cup raw fruit.  1 medium-sized orange, apple, or banana.  1 cup 100% fruit or vegetable juice.  2 cups raw leafy greens, such as lettuce, spinach, or kale.  ½ cup dried fruit.  Grains  One fourth of your plate should be grains.  Make at least half of the grains you eat each day whole grains.  For a 2,000-calorie daily food plan, eat 6 oz of grains every day.  1 oz is equal to:  1 slice bread.  1 cup cereal.  ½ cup cooked rice, cereal, or pasta.  Protein  One fourth of your plate should be protein.  Eat a wide variety of protein foods, including meat, poultry, fish, eggs, beans, nuts, and tofu.  For a 2,000-calorie daily food plan, eat 5½ oz of protein every day.  1 oz is equal to:  1 oz meat, poultry, or fish.  ¼ cup cooked beans.  1 egg.  ½ oz nuts or seeds.  1 Tbsp peanut butter.  Dairy  Drink fat-free  or low-fat (1%) milk.  Eat or drink dairy as a side to meals.  For a 2,000-calorie daily food plan, eat or drink 3 cups of dairy every day.  1 cup is equal to:  1 cup milk, yogurt, cottage cheese, or soy milk (soy beverage).  2 oz processed cheese.  1½ oz natural cheese.  Fats, oils, salt, and sugars  Only small amounts of oils are recommended.  Avoid foods that are high in calories and low in nutritional value (empty calories), like foods high in fat or added sugars.  Choose foods that are low in salt (sodium). Choose foods that have less than 140 milligrams (mg) of sodium per serving.  Drink water instead of sugary drinks. Drink enough fluid to keep your urine pale yellow.  Where to find support  Work with your health care provider or a dietitian to develop a customized eating plan that is right for you.  Download an donnell (mobile application) to help you track your daily food intake.  Where to find more information  USDA: ChooseMyPlate.gov  Summary  MyPlate is a general guideline for healthy eating from the USDA. It is based on the Dietary Guidelines for Americans, 5823-8254.  In general, fruits and vegetables should take up one half of your plate, grains should take up one fourth of your plate, and protein should take up one fourth of your plate.  This information is not intended to replace advice given to you by your health care provider. Make sure you discuss any questions you have with your health care provider.  Document Revised: 11/08/2021 Document Reviewed: 11/08/2021  [employer] Patient Education © 2022 Else[employer] Inc.

## 2024-05-03 DIAGNOSIS — F41.1 GENERALIZED ANXIETY DISORDER: ICD-10-CM

## 2024-05-03 DIAGNOSIS — F33.2 SEVERE EPISODE OF RECURRENT MAJOR DEPRESSIVE DISORDER, WITHOUT PSYCHOTIC FEATURES: ICD-10-CM

## 2024-05-03 DIAGNOSIS — I10 HYPERTENSION, UNSPECIFIED TYPE: ICD-10-CM

## 2024-05-04 DIAGNOSIS — I10 HYPERTENSION, UNSPECIFIED TYPE: ICD-10-CM

## 2024-05-06 RX ORDER — HYDROCHLOROTHIAZIDE 25 MG/1
25 TABLET ORAL DAILY
Qty: 90 TABLET | Refills: 0 | Status: SHIPPED | OUTPATIENT
Start: 2024-05-06

## 2024-05-06 RX ORDER — LOSARTAN POTASSIUM 50 MG/1
50 TABLET ORAL DAILY
Qty: 90 TABLET | Refills: 0 | Status: SHIPPED | OUTPATIENT
Start: 2024-05-06

## 2024-05-06 RX ORDER — BUPROPION HYDROCHLORIDE 150 MG/1
450 TABLET ORAL EVERY MORNING
Qty: 90 TABLET | Refills: 0 | Status: SHIPPED | OUTPATIENT
Start: 2024-05-06 | End: 2024-06-05

## 2024-05-10 ENCOUNTER — TELEMEDICINE (OUTPATIENT)
Dept: PSYCHIATRY | Facility: CLINIC | Age: 29
End: 2024-05-10
Payer: COMMERCIAL

## 2024-05-10 DIAGNOSIS — F33.42 RECURRENT MAJOR DEPRESSIVE DISORDER, IN FULL REMISSION: Primary | ICD-10-CM

## 2024-05-10 DIAGNOSIS — F41.1 GENERALIZED ANXIETY DISORDER: ICD-10-CM

## 2024-05-16 ENCOUNTER — HOSPITAL ENCOUNTER (OUTPATIENT)
Dept: ULTRASOUND IMAGING | Facility: HOSPITAL | Age: 29
Discharge: HOME | End: 2024-05-16
Admitting: NURSE PRACTITIONER
Payer: COMMERCIAL

## 2024-05-16 DIAGNOSIS — R22.41 MASS OF RIGHT LOWER EXTREMITY: ICD-10-CM

## 2024-05-16 PROCEDURE — 76882 US LMTD JT/FCL EVL NVASC XTR: CPT

## 2024-05-20 ENCOUNTER — HOSPITAL ENCOUNTER (OUTPATIENT)
Dept: GENERAL RADIOLOGY | Facility: HOSPITAL | Age: 29
Discharge: HOME | End: 2024-05-20
Admitting: NURSE PRACTITIONER
Payer: COMMERCIAL

## 2024-05-20 DIAGNOSIS — R22.41 MASS OF RIGHT LOWER EXTREMITY: Primary | ICD-10-CM

## 2024-05-20 DIAGNOSIS — R22.41 MASS OF RIGHT LOWER EXTREMITY: ICD-10-CM

## 2024-05-20 PROCEDURE — 73590 X-RAY EXAM OF LOWER LEG: CPT

## 2024-05-30 ENCOUNTER — HOSPITAL ENCOUNTER (OUTPATIENT)
Dept: MRI IMAGING | Facility: HOSPITAL | Age: 29
Discharge: HOME | End: 2024-05-30
Admitting: NURSE PRACTITIONER
Payer: COMMERCIAL

## 2024-05-30 DIAGNOSIS — R22.41 MASS OF RIGHT LOWER EXTREMITY: ICD-10-CM

## 2024-05-30 PROCEDURE — A9577 INJ MULTIHANCE: HCPCS | Performed by: NURSE PRACTITIONER

## 2024-05-30 PROCEDURE — 73720 MRI LWR EXTREMITY W/O&W/DYE: CPT

## 2024-05-30 PROCEDURE — 0 GADOBENATE DIMEGLUMINE 529 MG/ML SOLUTION: Performed by: NURSE PRACTITIONER

## 2024-05-30 RX ADMIN — GADOBENATE DIMEGLUMINE 15 ML: 529 INJECTION, SOLUTION INTRAVENOUS at 07:03

## 2024-06-02 DIAGNOSIS — F33.2 SEVERE EPISODE OF RECURRENT MAJOR DEPRESSIVE DISORDER, WITHOUT PSYCHOTIC FEATURES: ICD-10-CM

## 2024-06-03 RX ORDER — TRAZODONE HYDROCHLORIDE 50 MG/1
50-100 TABLET ORAL NIGHTLY
Qty: 60 TABLET | Refills: 0 | Status: SHIPPED | OUTPATIENT
Start: 2024-06-03

## 2024-06-03 RX ORDER — CARIPRAZINE 3 MG/1
3 CAPSULE, GELATIN COATED ORAL DAILY
Qty: 30 CAPSULE | Refills: 0 | Status: SHIPPED | OUTPATIENT
Start: 2024-06-03 | End: 2024-06-06 | Stop reason: DRUGHIGH

## 2024-06-04 DIAGNOSIS — R22.41 MASS OF RIGHT LOWER EXTREMITY: Primary | ICD-10-CM

## 2024-06-06 ENCOUNTER — TELEMEDICINE (OUTPATIENT)
Dept: PSYCHIATRY | Facility: CLINIC | Age: 29
End: 2024-06-06
Payer: COMMERCIAL

## 2024-06-06 DIAGNOSIS — F33.42 RECURRENT MAJOR DEPRESSIVE DISORDER, IN FULL REMISSION: Primary | ICD-10-CM

## 2024-06-06 DIAGNOSIS — F39 UNSPECIFIED MOOD (AFFECTIVE) DISORDER: ICD-10-CM

## 2024-06-06 DIAGNOSIS — F41.1 GENERALIZED ANXIETY DISORDER: ICD-10-CM

## 2024-06-06 DIAGNOSIS — F43.10 POST TRAUMATIC STRESS DISORDER (PTSD): ICD-10-CM

## 2024-06-06 RX ORDER — BUPROPION HYDROCHLORIDE 150 MG/1
450 TABLET ORAL EVERY MORNING
Qty: 90 TABLET | Refills: 2 | Status: SHIPPED | OUTPATIENT
Start: 2024-06-06 | End: 2024-09-04
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
## 2024-06-07 ENCOUNTER — TELEMEDICINE (OUTPATIENT)
Dept: PSYCHIATRY | Facility: CLINIC | Age: 29
End: 2024-06-07
Payer: COMMERCIAL

## 2024-06-07 DIAGNOSIS — F41.1 GENERALIZED ANXIETY DISORDER: ICD-10-CM

## 2024-06-07 DIAGNOSIS — F33.1 MAJOR DEPRESSIVE DISORDER, RECURRENT EPISODE, MODERATE: Primary | ICD-10-CM

## 2024-06-07 DIAGNOSIS — F43.10 POST TRAUMATIC STRESS DISORDER (PTSD): ICD-10-CM

## 2024-06-07 NOTE — PROGRESS NOTES
"[facility]   Follow-up Progress Note     Date: June 7, 2024  Time In: 11:01  Time Out: 11:25      PROGRESS NOTE  Data:  Elicia Arndt is a 29 y.o. female presenting to [facility] (through [facility]), [address] [place], [address] using a secure MyChart Video Visit through [facility] for assessment with Chas Ramirez LCSW. The patient is seen remotely in their  car  using [facility] My Chart. Patient is being seen via telehealth and stated they are in a secure environment for this session. The patient's condition being diagnosed/treated is appropriate for telemedicine. The provider identified herself as well as her credentials. The patient and/or patients guardian consent to be seen remotely, and when consent is given they understand that the consent allows for patient identifiable information to be sent to a third party as needed. They may refuse to be seen remotely at any time. The electronic data is encrypted and password protected, and the patient has been advised of the potential risks to privacy not withstanding such measures.    Today Patient stated she completed ADHD testing and determined she does not have that diagnosis but did identify PTSD as a diagnosis. Patient expressed due to her past the  felt her symptoms met criteria for the diagnosis. Patient expressed she was surprised and did cry. Patient stated she tries to \"not think about that stuff\" and feels her past does not impact her daily functioning. Provider discussed the side effects of ignoring her symptoms. Patient reported that she did have a disagreement with her sister a few days ago which has caused patient to take some space away from her sister. Patient stated her sister called her on the day of her daughter's birthday party asking for patient's help to go  her vehicle. Patient stated she was not leaving her daughter's " birthday and her sister was upset with patient. Processed with patient her thoughts and emotions about her relationship with her sister. Discussed with patient maintaining boundaries for her own protection. Patient stated she is trying to enjoy her daughters' summer break and looking forward to going to the zoo for her daughter's birthday. Provider discussed treatment for patient's past trauma and patient was agreeable it was something she would like to incorporate into treatment.       Clinical Maneuvering/Intervention:    Chief Complaint: depression, anxiety, ptsd    (Scales based on 0 - 10 with 10 being the worst)  Depression: 3 Anxiety: 3     Assisted Patient in processing above session content; acknowledged and normalized patient’s thoughts, feelings, and concerns.  Rationalized patient thought process regarding navigating a new diagnosis and her familial relationships.  Discussed triggers associated with patient's depression.  Also discussed coping skills for patient to implement such as maintaining boundaries.    Allowed Patient to freely discuss issues  without interruption or judgement with unconditional positive regard, active listening skills, and empathy. Therapist provided a safe, confidential environment to facilitate the development of a positive therapeutic relationship and encouraged open, honest communication. Assisted Patient in identifying risk factors which would indicate the need for higher level of care including thoughts to harm self or others and/or self-harming behavior and encouraged Patient to contact this office, call 911, or present to the nearest emergency room should any of these events occur. Discussed crisis intervention services and means to access. Patient adamantly and convincingly denies current suicidal or homicidal ideation or perceptual disturbance. Assisted Patient in processing session content; acknowledged and normalized Patient’s thoughts, feelings, and concerns by  utilizing a person-centered approach in efforts to build appropriate rapport and a positive therapeutic relationship with open and honest communication. Therapist utilized dialectical behavior techniques to teach and model emotional regulation and relaxation methods. Therapist assisted Patient with identifying and implementing healthier coping strategies.     Assessment   Patient appears to be experiencing heightened anxiety and depression in response to COVID-19 epidemic  As a result, they can be reasonably expected to continue to benefit from treatment and would likely be at increased risk for decompensation otherwise.    Mental Status Exam:   Hygiene:   good  Cooperation:  Cooperative  Eye Contact:  Good  Psychomotor Behavior:  Appropriate  Affect:  Full range  Mood:  happy  Speech:  Normal  Thought Process:  Goal directed  Thought Content:  Mood congruent  Suicidal:  None  Homicidal:  None  Hallucinations:  None  Delusion:  None  Memory:  Intact  Orientation:  Person, Place, Time, and Situation  Reliability:  good  Insight:  Good  Judgement:  Good  Impulse Control:  Good  Physical/Medical Issues:  No      PHQ-Score Total:  PHQ-9 Total Score:        Patient's Support Network Includes:   and mother    Functional Status: Moderate impairment     Progress toward goal: Not at goal    Prognosis: Good with Ongoing Treatment            Impression/Formulation:    VISIT DIAGNOSIS:     ICD-10-CM ICD-9-CM   1. Major depressive disorder, recurrent episode, moderate  F33.1 296.32   2. Generalized anxiety disorder  F41.1 300.02   3. Post traumatic stress disorder (PTSD)  F43.10 309.81        Patient appeared alert and oriented.  Patient is voluntarily requesting to continue outpatient therapy at [facility].  Patient is receptive to assistance with maintaining a stable lifestyle.  Patient presents with history of anxiety and depression.  Patient is agreeable to attend routine therapy  sessions.  Patient expressed desire to maintain stability and participate in the therapeutic process.        Crisis Plan:  Symptoms and/or behaviors to indicate a crisis: Excessive worry or fear and Feeling sad or low    What calming techniques or other strategies will patient use to de-esclate and stay safe: slow down, breathe, visualize calming self, think it though, listen to music, change focus, take a walk    Who is one person patient can contact to assist with de-escalation? mother    If symptoms/behaviors persist, Patient will present to the nearest hospital for an assessment. Advised patient of [facility] ER and assessment services.     Plan:   Patient will continue in individual outpatient therapy with focus on improved functioning and coping skills, maintaining stability, and avoiding decompensation and the need for higher level of care.    Patient will contact this office ([facility] at [phone]), call 911 or present to the nearest emergency room should suicidal or homicidal ideations occur. Provide Cognitive Behavioral Therapy and Solution Focused Therapy to improve functioning, maintain stability, and avoid decompensation and the need for higher level of care.     Return in about 4 weeks, or earlier if symptoms worsen or fail to improve.    Recommended Referrals: none        This document has been electronically signed by Chas Ramirez LCSW  June 7, 2024 11:01 EDT        Part of this note may be an electronic transcription/translation of spoken language to printed text using the Dragon Dictation System.

## 2024-06-19 ENCOUNTER — TELEMEDICINE (OUTPATIENT)
Dept: SLEEP MEDICINE | Facility: CLINIC | Age: 29
End: 2024-06-19
Payer: COMMERCIAL

## 2024-06-19 DIAGNOSIS — R29.818 SUSPECTED SLEEP APNEA: ICD-10-CM

## 2024-06-19 DIAGNOSIS — I10 ESSENTIAL HYPERTENSION: Primary | ICD-10-CM

## 2024-06-19 DIAGNOSIS — R06.83 SNORING: ICD-10-CM

## 2024-06-19 DIAGNOSIS — G47.19 EXCESSIVE DAYTIME SLEEPINESS: ICD-10-CM

## 2024-06-19 DIAGNOSIS — R51.9 MORNING HEADACHE: ICD-10-CM

## 2024-06-19 DIAGNOSIS — R00.2 PALPITATIONS: ICD-10-CM

## 2024-06-19 PROCEDURE — 99213 OFFICE O/P EST LOW 20 MIN: CPT | Performed by: NURSE PRACTITIONER

## 2024-06-19 NOTE — PROGRESS NOTES
Chief Complaint:   Chief Complaint   Patient presents with    Sleeping Problem       HPI:    Elicia Arndt is a 29 y.o. female here to establish care.  Patient sees Arina BARNEY for care.  Patient has medical history as below:  Past Medical History:   Diagnosis Date    Anxiety and depression     Hypertension     Rotator cuff tendinitis, right 2010    MRI and Ortho and PT 2019         States she has recently had a Holter monitor she thinks within the last 2 months and did get report of abnormal rhythm during her study however does not know any more details other than that.  She does know she snores, has witnessed apneas, has morning headaches 3-4 times a week and excessive daytime sleepiness.  Patient has no history of head injury, nasal fracture, or sleep paralysis.  She has at times seeing people when she is trying to go to sleep sometimes 1-2 times a week.    During the week patient will go to bed at the same time as the weekend going to bed at 10 PM awakening at 4 AM.  She estimates she is getting 5 hours of sleep nightly.  She is not rested upon awakening, however, I did review her Humphrey score with her x 2 and it remains 0/24.  Patient does toss and turn frequently throughout the night.    We did speak today about consequences of untreated sleep apnea such as hypertension, atrial fibrillation, stroke, early onset dementia and sudden cardiac death.  We also discussed different therapies available to her such as CPAP, MAD, or ENT referral.  Patient verbalizes understanding.    Social history  This is a very pleasant 29-year-old female.  Patient does smoke and half pack cigarettes daily and has a very rare alcoholic beverage.  Patient denies illicit substance use.  She will have 1 cup of coffee daily 1 glass of tea and 3-4 sodas.    Family History   Problem Relation Age of Onset    Hyperlipidemia Mother     Alcohol abuse Father     Anxiety disorder Father     Diabetes Father     Other Father          Addand adhd    COPD Father     Depression Father     Asthma Sister     Hypertension Sister     Endometriosis Sister     Anxiety disorder Sister     Anxiety disorder Brother     Depression Brother     ADD / ADHD Brother     Heart disease Maternal Grandmother     COPD Maternal Grandmother     Ovarian cancer Maternal Grandmother     Heart attack Maternal Grandfather         40    Hypertension Maternal Grandfather     Stroke Maternal Grandfather     Anxiety disorder Maternal Grandfather     Depression Paternal Grandfather     Cancer Paternal Grandfather      Past Surgical History:   Procedure Laterality Date    NO PAST SURGERIES      WISDOM TOOTH EXTRACTION             Current medications are:   Current Outpatient Medications:     buPROPion XL (WELLBUTRIN XL) 150 MG 24 hr tablet, Take 3 tablets by mouth Every Morning for 90 days., Disp: 90 tablet, Rfl: 2    Cariprazine HCl (Vraylar) 4.5 MG capsule capsule, Take 1 capsule by mouth Daily for 90 days., Disp: 30 capsule, Rfl: 2    hydroCHLOROthiazide 25 MG tablet, TAKE 1 TABLET BY MOUTH DAILY, Disp: 90 tablet, Rfl: 0    hydrOXYzine (ATARAX) 25 MG tablet, Take 1/2 to 1 tablet by mouth every night for sleep/ anxiety., Disp: 30 tablet, Rfl: 2    losartan (COZAAR) 50 MG tablet, TAKE 1 TABLET BY MOUTH DAILY, Disp: 90 tablet, Rfl: 0    nebivolol (Bystolic) 5 MG tablet, Take 1 tablet by mouth Daily., Disp: 30 tablet, Rfl: 3    traZODone (DESYREL) 50 MG tablet, TAKE ONE TO TWO TABLETS BY MOUTH ONCE NIGHTLY, Disp: 60 tablet, Rfl: 0.      The patient's relevant past medical, surgical, family and social history were reviewed and updated in Epic as appropriate.       Review of Systems   Constitutional:  Positive for fatigue.   Eyes:  Positive for visual disturbance.   Respiratory:  Positive for apnea.    Gastrointestinal:  Positive for constipation.   Neurological:  Positive for headaches.   Psychiatric/Behavioral:  Positive for dysphoric mood and sleep disturbance. The patient is  nervous/anxious.    All other systems reviewed and are negative.        Objective:    Physical Exam  Constitutional:       Appearance: Normal appearance.   HENT:      Head: Normocephalic and atraumatic.   Pulmonary:      Effort: Pulmonary effort is normal. No respiratory distress.   Neurological:      Mental Status: She is alert.   Psychiatric:         Mood and Affect: Mood normal.         Behavior: Behavior normal.         Thought Content: Thought content normal.         Judgment: Judgment normal.                 ASSESSMENT/PLAN    Diagnoses and all orders for this visit:    1. Essential hypertension (Primary)  -     Home Sleep Study; Future    2. Palpitations  -     Home Sleep Study; Future    3. Suspected sleep apnea  -     Home Sleep Study; Future    4. Morning headache  -     Home Sleep Study; Future    5. Snoring  -     Home Sleep Study; Future    6. Excessive daytime sleepiness  -     Home Sleep Study; Future        Counseled patient regarding multimodal approach with healthy nutrition, healthy sleep, regular physical activity, social activities, counseling, and medications. Encouraged to practice lateral sleep position. Avoid alcohol and sedatives close to bedtime.    Certainly has a strong story for sleep apnea and due to the consequences of untreated sleep apnea will move forward with HST and follow-up as appropriate.  The patient is located in [facility] at home. The patient presents today for telehealth service.  This service was conducted via audio/video technology through a secure [employer] video visit connection through Epic.  This provider is located in [facility].  Patient stated they are in a secure environment for the session.  Patient's condition being diagnosed/treated is appropriate for telemedicine.  The provider identified himself as well as his credentials.  The patient, and/or patient's guardian, consent to be seen remotely, and when consent is given they understanding that  the consent allows for patient identifiable information to be sent to a third-party as needed.  They may refuse to be seen remotely at any time.  The electronic data is encrypted and password protected, and the patient and/or guardian has been advised of the potential risk to privacy not withstanding such measures.  Patient identifiers used: Name and date of birth.   I have reviewed the results of my evaluation and impression and discussed my recommendations in detail with the patient.      Signed by  ECTOR Gaines    June 19, 2024      CC: Arina Venegas APRN Musto, Lindsay Renee, A*

## 2024-07-03 ENCOUNTER — TELEMEDICINE (OUTPATIENT)
Dept: PSYCHIATRY | Facility: CLINIC | Age: 29
End: 2024-07-03
Payer: COMMERCIAL

## 2024-07-03 DIAGNOSIS — F39 UNSPECIFIED MOOD (AFFECTIVE) DISORDER: ICD-10-CM

## 2024-07-03 DIAGNOSIS — G47.09 OTHER INSOMNIA: ICD-10-CM

## 2024-07-03 DIAGNOSIS — F33.2 SEVERE EPISODE OF RECURRENT MAJOR DEPRESSIVE DISORDER, WITHOUT PSYCHOTIC FEATURES: ICD-10-CM

## 2024-07-03 DIAGNOSIS — F43.10 POST TRAUMATIC STRESS DISORDER (PTSD): ICD-10-CM

## 2024-07-03 DIAGNOSIS — F41.1 GENERALIZED ANXIETY DISORDER: ICD-10-CM

## 2024-07-03 DIAGNOSIS — F33.1 MAJOR DEPRESSIVE DISORDER, RECURRENT EPISODE, MODERATE: Primary | ICD-10-CM

## 2024-07-03 RX ORDER — TRAZODONE HYDROCHLORIDE 50 MG/1
50-100 TABLET ORAL NIGHTLY
Qty: 60 TABLET | Refills: 2 | Status: SHIPPED | OUTPATIENT
Start: 2024-07-03 | End: 2024-10-01
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
## 2024-07-05 ENCOUNTER — TELEMEDICINE (OUTPATIENT)
Dept: PSYCHIATRY | Facility: CLINIC | Age: 29
End: 2024-07-05
Payer: COMMERCIAL

## 2024-07-05 DIAGNOSIS — F33.1 MODERATE EPISODE OF RECURRENT MAJOR DEPRESSIVE DISORDER: ICD-10-CM

## 2024-07-05 DIAGNOSIS — F43.10 POST TRAUMATIC STRESS DISORDER (PTSD): ICD-10-CM

## 2024-07-05 DIAGNOSIS — F41.1 GENERALIZED ANXIETY DISORDER: Primary | ICD-10-CM

## 2024-07-05 RX ORDER — NEBIVOLOL 5 MG/1
5 TABLET ORAL DAILY
Qty: 30 TABLET | Refills: 3 | Status: SHIPPED | OUTPATIENT
Start: 2024-07-05

## 2024-07-05 NOTE — TELEPHONE ENCOUNTER
Last seen 4/10/24. No follow up on file. Refills sent to [facility] in [place].  Corina Ayala MA

## 2024-07-05 NOTE — TREATMENT PLAN
Multi-Disciplinary Problems (from Behavioral Health Treatment Plan)      Active Problems       Problem: Anxiety  Start Date: 07/05/24      Problem Details: The patient self-scales this problem as a 9 with 10 being the worst.          Goal Priority Start Date Expected End Date End Date    Patient will develop and implement behavioral and cognitive strategies to reduce anxiety and irrational fears. -- 07/05/24 -- --    Goal Details: Progress toward goal:  The patient self-scales their progress related to this goal as a 1 with 10 being the worst.          Goal Intervention Frequency Start Date End Date    Help patient explore past emotional issues in relation to present anxiety. Q4 Weeks 07/05/24 --    Intervention Details: Duration of treatment until until discharged.          Goal Intervention Frequency Start Date End Date    Help patient develop an awareness of their cognitive and physical responses to anxiety. Q4 Weeks 07/05/24 --    Intervention Details: Duration of treatment until until discharged.                  Problem: Post Traumatic Stress  Start Date: 07/05/24      Problem Details: The patient self-scales this problem as a 10 with 10 being the worst.          Goal Priority Start Date Expected End Date End Date    Patient will process and move through trauma in a way that improves self regard and the patients ability to function optimally in the world around them. -- 07/05/24 -- --    Goal Details: Progress toward goal:  Not appropriate to rate progress toward goal since this is the initial treatment plan.          Goal Intervention Frequency Start Date End Date    Assist patient in identifying ways that trauma has negatively impacted their view of themselves and the world. Q4 Weeks 07/05/24 --    Intervention Details: Duration of treatment until until discharged.          Goal Intervention Frequency Start Date End Date    Process trauma in the context of the safe session environment. Q4 Weeks 07/05/24 --     Intervention Details: Duration of treatment until until discharged.          Goal Intervention Frequency Start Date End Date    Develop a plan of behavior changes that will reduce the stress of the trauma. Q4 Weeks 07/05/24 --    Intervention Details: Duration of treatment until until discharged.                          Reviewed By       Chas Ramirez, [facility] 07/05/24 1011                     I have discussed and reviewed this treatment plan with the patient.

## 2024-07-05 NOTE — PROGRESS NOTES
"[facility]   Follow-up Progress Note     Date: July 5, 2024  Time In: 10:02  Time Out: 10:39      PROGRESS NOTE  Data:  Elicia Arndt is a 29 y.o. female presenting to [facility] (through [facility]), [address] [place], [address] using a secure MyChart Video Visit through [facility] for assessment with Chas Ramirez LCSW. The patient is seen remotely in their  home  using [facility] My Chart. Patient is being seen via telehealth and stated they are in a secure environment for this session. The patient's condition being diagnosed/treated is appropriate for telemedicine. The provider identified herself as well as her credentials. The patient and/or patients guardian consent to be seen remotely, and when consent is given they understand that the consent allows for patient identifiable information to be sent to a third party as needed. They may refuse to be seen remotely at any time. The electronic data is encrypted and password protected, and the patient has been advised of the potential risks to privacy not withstanding such measures.    Today Patient expressed she is doing well. Patient stated she enjoyed spending time with her [place] the zoo. Patient expressed she is has accepted her recent diagnosis but expressed it is challenging to discuss. Patient stated she doesn't feel ready to address her past trauma. Patient stated she would like to be able to process her trauma \"and not keep it in as much.\" Patient engaged in identify goals around addressing her past trauma. Patient was engaged in learning mindfulness breathing. Discussed with patient building tools to feel able to regulation her emotions when discussing triggers and traumatic events. Patient stated medication provider is leaving the practice and needs to transfer to a new medication provider. Provider reviewed with patient to call the " office and get an 8-week scheduled appointment.       Clinical Maneuvering/Intervention:    Chief Complaint: depression, anxiety, PTSD    (Scales based on 0 - 10 with 10 being the worst)  Depression: 2 Anxiety: 4     Assisted Patient in processing above session content; acknowledged and normalized patient’s thoughts, feelings, and concerns.  Rationalized patient thought process regarding identifying treatment goals around past trauma.  Discussed triggers associated with patient's trauma.  Also discussed coping skills for patient to implement such as mindfulness breathing.    Allowed Patient to freely discuss issues  without interruption or judgement with unconditional positive regard, active listening skills, and empathy. Therapist provided a safe, confidential environment to facilitate the development of a positive therapeutic relationship and encouraged open, honest communication. Assisted Patient in identifying risk factors which would indicate the need for higher level of care including thoughts to harm self or others and/or self-harming behavior and encouraged Patient to contact this office, call 911, or present to the nearest emergency room should any of these events occur. Discussed crisis intervention services and means to access. Patient adamantly and convincingly denies current suicidal or homicidal ideation or perceptual disturbance. Assisted Patient in processing session content; acknowledged and normalized Patient’s thoughts, feelings, and concerns by utilizing a person-centered approach in efforts to build appropriate rapport and a positive therapeutic relationship with open and honest communication. Therapist utilized dialectical behavior techniques to teach and model emotional regulation and relaxation methods. Therapist assisted Patient with identifying and implementing healthier coping strategies.     Assessment   Patient appears to be experiencing heightened anxiety and depression in response to  COVID-19 epidemic  As a result, they can be reasonably expected to continue to benefit from treatment and would likely be at increased risk for decompensation otherwise.    Mental Status Exam:   Hygiene:   good  Cooperation:  Cooperative  Eye Contact:  Good  Psychomotor Behavior:  Appropriate  Affect:  Full range  Mood:  tired  Speech:  Normal  Thought Process:  Goal directed  Thought Content:  Mood congruent  Suicidal:  None  Homicidal:  None  Hallucinations:  None  Delusion:  None  Memory:  Intact  Orientation:  Person, Place, Time, and Situation  Reliability:  good  Insight:  Good  Judgement:  Good  Impulse Control:  Good  Physical/Medical Issues:  No      PHQ-Score Total:  PHQ-9 Total Score:        Patient's Support Network Includes:   and mother    Functional Status: Moderate impairment     Progress toward goal: Not at goal    Prognosis: Good with Ongoing Treatment            Impression/Formulation:    VISIT DIAGNOSIS:     ICD-10-CM ICD-9-CM   1. Generalized anxiety disorder  F41.1 300.02   2. Post traumatic stress disorder (PTSD)  F43.10 309.81   3. Recurrent major depressive disorder, in full remission  F33.42 296.36        Patient appeared alert and oriented.  Patient is voluntarily requesting to continue outpatient therapy at [facility].  Patient is receptive to assistance with maintaining a stable lifestyle.  Patient presents with history of anxiety and depression.  Patient is agreeable to attend routine therapy sessions.  Patient expressed desire to maintain stability and participate in the therapeutic process.        Crisis Plan:  Symptoms and/or behaviors to indicate a crisis: Excessive worry or fear and Feeling sad or low    What calming techniques or other strategies will patient use to de-esclate and stay safe: slow down, breathe, visualize calming self, think it though, listen to music, change focus, take a walk    Who is one person patient can contact to  assist with de-escalation? mother    If symptoms/behaviors persist, Patient will present to the nearest hospital for an assessment. Advised patient of [facility] and assessment services.     Plan:   Patient will continue in individual outpatient therapy with focus on improved functioning and coping skills, maintaining stability, and avoiding decompensation and the need for higher level of care.    Patient will contact this office ([facility] at [phone]), call 911 or present to the nearest emergency room should suicidal or homicidal ideations occur. Provide Cognitive Behavioral Therapy and Solution Focused Therapy to improve functioning, maintain stability, and avoid decompensation and the need for higher level of care.     Return in about 4 weeks, or earlier if symptoms worsen or fail to improve.    Recommended Referrals: none        This document has been electronically signed by Chas Ramirez LCSW  July 5, 2024 10:01 EDT        Part of this note may be an electronic transcription/translation of spoken language to printed text using the Dragon Dictation System.

## 2024-08-01 DIAGNOSIS — I10 HYPERTENSION, UNSPECIFIED TYPE: ICD-10-CM

## 2024-08-01 RX ORDER — LOSARTAN POTASSIUM 50 MG/1
50 TABLET ORAL DAILY
Qty: 90 TABLET | Refills: 1 | Status: SHIPPED | OUTPATIENT
Start: 2024-08-01

## 2024-08-01 RX ORDER — HYDROCHLOROTHIAZIDE 25 MG/1
25 TABLET ORAL DAILY
Qty: 90 TABLET | Refills: 1 | Status: SHIPPED | OUTPATIENT
Start: 2024-08-01

## 2024-08-02 ENCOUNTER — LAB (OUTPATIENT)
Dept: LAB | Facility: HOSPITAL | Age: 29
End: 2024-08-02
Payer: COMMERCIAL

## 2024-08-02 ENCOUNTER — TRANSCRIBE ORDERS (OUTPATIENT)
Dept: LAB | Facility: HOSPITAL | Age: 29
End: 2024-08-02
Payer: COMMERCIAL

## 2024-08-02 ENCOUNTER — TELEMEDICINE (OUTPATIENT)
Dept: PSYCHIATRY | Facility: CLINIC | Age: 29
End: 2024-08-02
Payer: COMMERCIAL

## 2024-08-02 DIAGNOSIS — Z01.812 PRE-OPERATIVE LABORATORY EXAMINATION: ICD-10-CM

## 2024-08-02 DIAGNOSIS — Z01.812 PRE-OPERATIVE LABORATORY EXAMINATION: Primary | ICD-10-CM

## 2024-08-02 DIAGNOSIS — F41.1 GENERALIZED ANXIETY DISORDER: Primary | ICD-10-CM

## 2024-08-02 DIAGNOSIS — F33.42 RECURRENT MAJOR DEPRESSIVE DISORDER, IN FULL REMISSION: ICD-10-CM

## 2024-08-02 DIAGNOSIS — F43.10 POST TRAUMATIC STRESS DISORDER (PTSD): ICD-10-CM

## 2024-08-02 LAB
ANION GAP SERPL CALCULATED.3IONS-SCNC: 9 MMOL/L (ref 5–15)
BUN SERPL-MCNC: 12 MG/DL (ref 6–20)
BUN/CREAT SERPL: 11.8 (ref 7–25)
CALCIUM SPEC-SCNC: 10 MG/DL (ref 8.6–10.5)
CHLORIDE SERPL-SCNC: 100 MMOL/L (ref 98–107)
CO2 SERPL-SCNC: 28 MMOL/L (ref 22–29)
CREAT SERPL-MCNC: 1.02 MG/DL (ref 0.57–1)
DEPRECATED RDW RBC AUTO: 42 FL (ref 37–54)
EGFRCR SERPLBLD CKD-EPI 2021: 76.5 ML/MIN/1.73
ERYTHROCYTE [DISTWIDTH] IN BLOOD BY AUTOMATED COUNT: 12.2 % (ref 12.3–15.4)
GLUCOSE SERPL-MCNC: 75 MG/DL (ref 65–99)
HCT VFR BLD AUTO: 45.5 % (ref 34–46.6)
HGB BLD-MCNC: 15.5 G/DL (ref 12–15.9)
MCH RBC QN AUTO: 31.8 PG (ref 26.6–33)
MCHC RBC AUTO-ENTMCNC: 34.1 G/DL (ref 31.5–35.7)
MCV RBC AUTO: 93.2 FL (ref 79–97)
PLATELET # BLD AUTO: 267 10*3/MM3 (ref 140–450)
PMV BLD AUTO: 11 FL (ref 6–12)
POTASSIUM SERPL-SCNC: 3.9 MMOL/L (ref 3.5–5.2)
RBC # BLD AUTO: 4.88 10*6/MM3 (ref 3.77–5.28)
SODIUM SERPL-SCNC: 137 MMOL/L (ref 136–145)
WBC NRBC COR # BLD AUTO: 8.46 10*3/MM3 (ref 3.4–10.8)

## 2024-08-02 PROCEDURE — 36415 COLL VENOUS BLD VENIPUNCTURE: CPT

## 2024-08-02 PROCEDURE — 85027 COMPLETE CBC AUTOMATED: CPT

## 2024-08-02 PROCEDURE — 80048 BASIC METABOLIC PNL TOTAL CA: CPT

## 2024-08-02 NOTE — PROGRESS NOTES
[facility]   Follow-up Progress Note     Date: August 2, 2024  Time In: 10:22  Time Out: 10:53      PROGRESS NOTE  Data:  Elicia Arndt is a 29 y.o. female presenting to [facility] (through [facility]), [address], [place], [address] using a secure MyChart Video Visit through [facility] for assessment with Chas Ramirez LCSW. The patient is seen remotely in their  home  using [facility] My Chart. Patient is being seen via telehealth and stated they are in a secure environment for this session. The patient's condition being diagnosed/treated is appropriate for telemedicine. The provider identified herself as well as her credentials. The patient and/or patients guardian consent to be seen remotely, and when consent is given they understand that the consent allows for patient identifiable information to be sent to a third party as needed. They may refuse to be seen remotely at any time. The electronic data is encrypted and password protected, and the patient has been advised of the potential risks to privacy not withstanding such measures.    Today Patient expressed she is doing well. Patient stated she has been busy with work and preparing her daughter's for school. Patient expressed she has felt supported through journaling her thoughts and emotions. Patient discussed how to manage emotions that are triggered during the journaling process. Assisted patient in identifying the emotions and utilize coping strategies to manage her emotions. Patient expressed that most of these emotions center around her father. Discussed with patient how she can journal her thoughts and emotions around her father to process those emotions.       Clinical Maneuvering/Intervention:    Chief Complaint: anxiety, trauma    (Scales based on 0 - 10 with 10 being the worst)  Depression: 3 Anxiety: 3     Assisted Patient in processing above  session content; acknowledged and normalized patient’s thoughts, feelings, and concerns.  Rationalized patient thought process regarding emotions during journaling.  Discussed triggers associated with patient's trauma.  Also discussed coping skills for patient to implement such as jouranling.    Allowed Patient to freely discuss issues  without interruption or judgement with unconditional positive regard, active listening skills, and empathy. Therapist provided a safe, confidential environment to facilitate the development of a positive therapeutic relationship and encouraged open, honest communication. Assisted Patient in identifying risk factors which would indicate the need for higher level of care including thoughts to harm self or others and/or self-harming behavior and encouraged Patient to contact this office, call 911, or present to the nearest emergency room should any of these events occur. Discussed crisis intervention services and means to access. Patient adamantly and convincingly denies current suicidal or homicidal ideation or perceptual disturbance. Assisted Patient in processing session content; acknowledged and normalized Patient’s thoughts, feelings, and concerns by utilizing a person-centered approach in efforts to build appropriate rapport and a positive therapeutic relationship with open and honest communication. Therapist utilized dialectical behavior techniques to teach and model emotional regulation and relaxation methods. Therapist assisted Patient with identifying and implementing healthier coping strategies.     Assessment   Patient appears to be experiencing heightened anxiety and depression in response to COVID-19 epidemic  As a result, they can be reasonably expected to continue to benefit from treatment and would likely be at increased risk for decompensation otherwise.    Mental Status Exam:   Hygiene:   good  Cooperation:  Cooperative  Eye Contact:  Good  Psychomotor Behavior:   Appropriate  Affect:  Full range  Mood:  happy  Speech:  Normal  Thought Process:  Goal directed  Thought Content:  Mood congruent  Suicidal:  None  Homicidal:  None  Hallucinations:  None  Delusion:  None  Memory:  Intact  Orientation:  Person, Place, Time, and Situation  Reliability:  good  Insight:  Good  Judgement:  Good  Impulse Control:  Good  Physical/Medical Issues:  No      PHQ-Score Total:  PHQ-9 Total Score:        Patient's Support Network Includes:   and mother    Functional Status: Moderate impairment     Progress toward goal: Not at goal    Prognosis: Good with Ongoing Treatment            Impression/Formulation:    VISIT DIAGNOSIS:     ICD-10-CM ICD-9-CM   1. Generalized anxiety disorder  F41.1 300.02   2. Post traumatic stress disorder (PTSD)  F43.10 309.81   3. Recurrent major depressive disorder, in full remission  F33.42 296.36        Patient appeared alert and oriented.  Patient is voluntarily requesting to continue outpatient therapy at [facility].  Patient is receptive to assistance with maintaining a stable lifestyle.  Patient presents with history of anxiety and depression.  Patient is agreeable to attend routine therapy sessions.  Patient expressed desire to maintain stability and participate in the therapeutic process.        Crisis Plan:  Symptoms and/or behaviors to indicate a crisis: Excessive worry or fear and Feeling sad or low    What calming techniques or other strategies will patient use to de-esclate and stay safe: slow down, breathe, visualize calming self, think it though, listen to music, change focus, take a walk    Who is one person patient can contact to assist with de-escalation? mother    If symptoms/behaviors persist, Patient will present to the nearest hospital for an assessment. Advised patient of [facility] ER and assessment services.     Plan:   Patient will continue in individual outpatient therapy with focus on  improved functioning and coping skills, maintaining stability, and avoiding decompensation and the need for higher level of care.    Patient will contact this office ([facility] at [phone]), call 911 or present to the nearest emergency room should suicidal or homicidal ideations occur. Provide Cognitive Behavioral Therapy and Solution Focused Therapy to improve functioning, maintain stability, and avoid decompensation and the need for higher level of care.     Return in about 4 weeks, or earlier if symptoms worsen or fail to improve.    Recommended Referrals: none        This document has been electronically signed by Chas Ramirez LCSW  August 2, 2024 10:15 EDT        Part of this note may be an electronic transcription/translation of spoken language to printed text using the Dragon Dictation System.

## 2024-08-15 ENCOUNTER — HOSPITAL ENCOUNTER (OUTPATIENT)
Dept: SLEEP MEDICINE | Facility: HOSPITAL | Age: 29
End: 2024-08-15
Payer: COMMERCIAL

## 2024-08-15 VITALS — HEIGHT: 64 IN | BODY MASS INDEX: 29.7 KG/M2 | WEIGHT: 173.94 LBS

## 2024-08-15 DIAGNOSIS — I10 ESSENTIAL HYPERTENSION: ICD-10-CM

## 2024-08-15 DIAGNOSIS — R06.83 SNORING: ICD-10-CM

## 2024-08-15 DIAGNOSIS — R51.9 MORNING HEADACHE: ICD-10-CM

## 2024-08-15 DIAGNOSIS — R00.2 PALPITATIONS: ICD-10-CM

## 2024-08-15 DIAGNOSIS — R29.818 SUSPECTED SLEEP APNEA: ICD-10-CM

## 2024-08-15 DIAGNOSIS — G47.19 EXCESSIVE DAYTIME SLEEPINESS: ICD-10-CM

## 2024-08-15 PROCEDURE — 95800 SLP STDY UNATTENDED: CPT

## 2024-08-19 DIAGNOSIS — R06.83 SNORING: ICD-10-CM

## 2024-08-19 DIAGNOSIS — G47.19 EXCESSIVE DAYTIME SLEEPINESS: ICD-10-CM

## 2024-08-19 DIAGNOSIS — R29.818 SUSPECTED SLEEP APNEA: Primary | ICD-10-CM

## 2024-08-20 PROCEDURE — 95800 SLP STDY UNATTENDED: CPT | Performed by: INTERNAL MEDICINE

## 2024-08-30 ENCOUNTER — TELEMEDICINE (OUTPATIENT)
Dept: PSYCHIATRY | Facility: CLINIC | Age: 29
End: 2024-08-30
Payer: COMMERCIAL

## 2024-08-30 DIAGNOSIS — F41.1 GENERALIZED ANXIETY DISORDER: Primary | ICD-10-CM

## 2024-08-30 DIAGNOSIS — F43.10 POST TRAUMATIC STRESS DISORDER (PTSD): ICD-10-CM

## 2024-08-30 DIAGNOSIS — F33.1 MAJOR DEPRESSIVE DISORDER, RECURRENT EPISODE, MODERATE: ICD-10-CM

## 2024-08-30 NOTE — PROGRESS NOTES
[facility]   Follow-up Progress Note     Date: August 30, 2024  Time In: 10:02  Time Out: 10:30      PROGRESS NOTE  Data:  Elicia Arndt is a 29 y.o. female presenting to [facility] (through [facility]), [address], [place], [address] using a secure MyChart Video Visit through [facility] for assessment with Chas Ramirez LCSW. The patient is seen remotely in their  home  using [facility] My Chart. Patient is being seen via telehealth and stated they are in a secure environment for this session. The patient's condition being diagnosed/treated is appropriate for telemedicine. The provider identified herself as well as her credentials. The patient and/or patients guardian consent to be seen remotely, and when consent is given they understand that the consent allows for patient identifiable information to be sent to a third party as needed. They may refuse to be seen remotely at any time. The electronic data is encrypted and password protected, and the patient has been advised of the potential risks to privacy not withstanding such measures.    Today Patient reported that she and her daughters have started. Patient stated one of her daughters has adjusted well but her other has been reluctant to want to go. Patient stated expressed she has struggled with staying focused to engage in her work. Patient was engaged in identifying strategies to increase focus. Patient stated she has continue to practice better boundaries with her mother. Patient expressed they have been communicating more.       Clinical Maneuvering/Intervention:    Chief Complaint: anxiety, trauma    (Scales based on 0 - 10 with 10 being the worst)  Depression: 2 Anxiety: 5     Assisted Patient in processing above session content; acknowledged and normalized patient’s thoughts, feelings, and concerns.  Rationalized patient thought process regarding  difficulty focusing on her school work.  Discussed triggers associated with patient's anxiety.  Also discussed coping skills for patient to implement such as time blocking.    Allowed Patient to freely discuss issues  without interruption or judgement with unconditional positive regard, active listening skills, and empathy. Therapist provided a safe, confidential environment to facilitate the development of a positive therapeutic relationship and encouraged open, honest communication. Assisted Patient in identifying risk factors which would indicate the need for higher level of care including thoughts to harm self or others and/or self-harming behavior and encouraged Patient to contact this office, call 911, or present to the nearest emergency room should any of these events occur. Discussed crisis intervention services and means to access. Patient adamantly and convincingly denies current suicidal or homicidal ideation or perceptual disturbance. Assisted Patient in processing session content; acknowledged and normalized Patient’s thoughts, feelings, and concerns by utilizing a person-centered approach in efforts to build appropriate rapport and a positive therapeutic relationship with open and honest communication. Therapist utilized dialectical behavior techniques to teach and model emotional regulation and relaxation methods. Therapist assisted Patient with identifying and implementing healthier coping strategies.     Assessment   Patient appears to be experiencing heightened anxiety and depression in response to COVID-19 epidemic  As a result, they can be reasonably expected to continue to benefit from treatment and would likely be at increased risk for decompensation otherwise.    Mental Status Exam:   Hygiene:   good  Cooperation:  Cooperative  Eye Contact:  Good  Psychomotor Behavior:  Appropriate  Affect:  Full range  Mood:  anxious  Speech:  Normal  Thought Process:  Goal directed  Thought Content:  Mood  congruent  Suicidal:  None  Homicidal:  None  Hallucinations:  None  Delusion:  None  Memory:  Intact  Orientation:  Person, Place, Time, and Situation  Reliability:  good  Insight:  Good  Judgement:  Good  Impulse Control:  Good  Physical/Medical Issues:  No      PHQ-Score Total:  PHQ-9 Total Score:        Patient's Support Network Includes:   and mother    Functional Status: Moderate impairment     Progress toward goal: Not at goal    Prognosis: Good with Ongoing Treatment            Impression/Formulation:    VISIT DIAGNOSIS:     ICD-10-CM ICD-9-CM   1. Generalized anxiety disorder  F41.1 300.02   2. Post traumatic stress disorder (PTSD)  F43.10 309.81   3. Major depressive disorder, recurrent episode, moderate  F33.1 296.32        Patient appeared alert and oriented.  Patient is voluntarily requesting to continue outpatient therapy at [facility].  Patient is receptive to assistance with maintaining a stable lifestyle.  Patient presents with history of anxiety and depression.  Patient is agreeable to attend routine therapy sessions.  Patient expressed desire to maintain stability and participate in the therapeutic process.        Crisis Plan:  Symptoms and/or behaviors to indicate a crisis: Excessive worry or fear and Feeling sad or low    What calming techniques or other strategies will patient use to de-esclate and stay safe: slow down, breathe, visualize calming self, think it though, listen to music, change focus, take a walk    Who is one person patient can contact to assist with de-escalation? mother    If symptoms/behaviors persist, Patient will present to the nearest hospital for an assessment. Advised patient of [facility] ER and assessment services.     Plan:   Patient will continue in individual outpatient therapy with focus on improved functioning and coping skills, maintaining stability, and avoiding decompensation and the need for higher level of  care.    Patient will contact this office ([facility] at [phone]), call 911 or present to the nearest emergency room should suicidal or homicidal ideations occur. Provide Cognitive Behavioral Therapy and Solution Focused Therapy to improve functioning, maintain stability, and avoid decompensation and the need for higher level of care.     Return in about 4 weeks, or earlier if symptoms worsen or fail to improve.    Recommended Referrals: none        This document has been electronically signed by Chas Ramirez LCSW  August 30, 2024 10:01 EDT        Part of this note may be an electronic transcription/translation of spoken language to printed text using the Dragon Dictation System.

## 2024-09-03 DIAGNOSIS — F39 UNSPECIFIED MOOD (AFFECTIVE) DISORDER: ICD-10-CM

## 2024-09-03 DIAGNOSIS — F33.42 RECURRENT MAJOR DEPRESSIVE DISORDER, IN FULL REMISSION: ICD-10-CM

## 2024-09-03 RX ORDER — CARIPRAZINE 4.5 MG/1
4.5 CAPSULE, GELATIN COATED ORAL DAILY
Qty: 30 CAPSULE | Refills: 2 | OUTPATIENT
Start: 2024-09-03

## 2024-09-03 NOTE — TELEPHONE ENCOUNTER
LVM for patient to call office in regards to medication refill request to see if patient has enough medication hold till appointment if not schedule patient sooner appointment per provider.

## 2024-09-05 DIAGNOSIS — F41.1 GENERALIZED ANXIETY DISORDER: ICD-10-CM

## 2024-09-05 DIAGNOSIS — F33.42 RECURRENT MAJOR DEPRESSIVE DISORDER, IN FULL REMISSION: ICD-10-CM

## 2024-09-05 RX ORDER — BUPROPION HYDROCHLORIDE 150 MG/1
TABLET ORAL
Qty: 90 TABLET | Refills: 2 | OUTPATIENT
Start: 2024-09-05

## 2024-09-12 ENCOUNTER — TELEMEDICINE (OUTPATIENT)
Dept: PSYCHIATRY | Facility: CLINIC | Age: 29
End: 2024-09-12
Payer: COMMERCIAL

## 2024-09-12 DIAGNOSIS — F41.1 GENERALIZED ANXIETY DISORDER: Chronic | ICD-10-CM

## 2024-09-12 DIAGNOSIS — F39 UNSPECIFIED MOOD (AFFECTIVE) DISORDER: Primary | Chronic | ICD-10-CM

## 2024-09-12 DIAGNOSIS — G47.00 INSOMNIA, UNSPECIFIED TYPE: Chronic | ICD-10-CM

## 2024-09-12 RX ORDER — BUPROPION HYDROCHLORIDE 150 MG/1
450 TABLET ORAL EVERY MORNING
Qty: 90 TABLET | Refills: 3 | Status: SHIPPED | OUTPATIENT
Start: 2024-09-12

## 2024-09-12 RX ORDER — SUMATRIPTAN 100 MG/1
TABLET, FILM COATED ORAL EVERY 12 HOURS SCHEDULED
COMMUNITY
Start: 2024-06-16

## 2024-09-12 RX ORDER — TRAZODONE HYDROCHLORIDE 50 MG/1
50-100 TABLET, FILM COATED ORAL NIGHTLY
Qty: 60 TABLET | Refills: 3 | Status: SHIPPED | OUTPATIENT
Start: 2024-09-12

## 2024-09-12 NOTE — PROGRESS NOTES
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 call 911 or go to the nearest emergency room.  The patient is advised to remove or secure (lock away) all lethal weapons (including guns) and sharps (including razors, scissors, knives, etc.).  All medications (including any prescribed and any over the counter medications) should be stored in a safe and secured location that is not obtainable by children/adolescents.  Patient was given an opportunity and encouraged to ask questions about their medication, illness, and treatment. Patient contracted verbally for the following: If you are experiencing an emotional crisis or have thoughts of harming yourself or others, please go to your nearest local emergency room or call 911. Will continue to re-assess medication response and side effects frequently to establish efficacy and ensure safety. Risks, any black box warnings, side effects, off label usage, and benefits of medication and treatment discussed with patient, along with potential adverse side effects of current and/or newly prescribed medication, alternative treatment options, and OTC medications.  Patient verbalized understanding of potential risks, any off label use of medication, any black box warnings, and any side effects in their own words. The patient  verbalized understanding and agreed to comply with the safety plan discussed in their own words.  Patient given the number to the office. Number also available to the 24- hour suicide hotline.       TREATMENT PLAN: Continue supportive psychotherapy efforts and medications as indicated.  Medication and treatment options, both pharmacological and non-pharmacological treatment options, discussed during today's visit, including any off label use of medication. Patient acknowledged and verbally consented with current treatment plan and was educated on the importance of compliance with treatment and follow-up appointments.          -Continue Wellbutrin  mg daily for mood/anxiety  -Continue Vraylar 4.5 mg daily as adjunct for mood/anxiety  -Continue Trazodone  mg nightly for sleep  -Continue psychotherapy with Chas Ramirez LCSW  -Rule out psychotic disorder potential contributing to reported hallucinations  -Discussed with the patient that this APRN will be taking a planned leave of absence at the end of the year.  Discussed with the patient that leave of absence is currently planned to begin around the beginning of October, but is subject to change and may likely begin sooner.  Discussed with the patient that this APRN will likely be out of the office for approximately 8-12 weeks once leave of absence begins.  Discussed with the patient that next follow-up visit will be scheduled for when this APRN is currently scheduled to return from leave of absence around the beginning of January.  Educated the patient on how to contact the clinic and instructed them to make contact with any potential questions, concerns, needs, medication refills, etc as necessary during provider's leave of absence, and that a covering provider within the clinic would address their concerns as needed.  The patient verablizes understanding in their own words and endorses that they are agreeable to this.  Instructed the patient to  notify the clinic for a sooner visit if needed/wanted in effort to ensure that the patient can follow-up with this APRN prior to leave of absence if requested/necessary.  The patient verabalizes understanding in their own words and endorses they are agreeable to this.          MEDICATION ISSUES: Discussed medication options and treatment plan of prescribed medication, any off label use of medication, as well as the risks, benefits, any black box warnings including increased suicidality, and side effects including but not limited to potential falls, dizziness, possible impaired driving, GI side effects (change in appetite, abdominal discomfort, nausea, vomiting, diarrhea, and/or constipation), dry mouth, somnolence, sedation, insomnia, activation, agitation, irritation, tremors, abnormal muscle movements or disorders, tardive dyskinesia, akathisia, asthenia, headache, sweating, possible bruising or rare bleeding, electrolyte and/or fluid abnormalities, change in blood pressure/heart rate/and or heart rhythm, hypotension, sexual dysfunction, rare impulse control problems, rare seizures, rare neuroleptic malignant syndrome, increased risk of death and cerebrovascular events, change in blood glucose and increased risk for diabetes, change in triglycerides and cholesterol and increased risk for dyslipidemia,  weight gain, weight gain that can become problematic to health, skin conditions and reactions, and metabolic adversities among others. Patient and/or guardian are agreeable to call the office with any worsening of symptoms or onset of side effects, or if any concerns or questions arise.  The contact information for the office is made available to the patient and/or guardian. Patient and/or guardian are agreeable to call 911 or go to the nearest ER should they begin having any SI/HI, or if any urgent concerns arise. No medication side effects or related complaints today.    This APRN has discussed with the  patient/guardian about the possibility of serotonin syndrome when certain medications are taken together, as is the case with this patient.  This APRN has provided the patient/guardian with a list of symptoms of serotonin syndrome including symptoms of autonomic instability, altered sensorium, confusion, restlessness, agitation, myoclonus, hyperreflexia, hyperthermia, diaphoresis, tremor, chills, diarrhea and cramps, ataxia, headache, migraines, seizures, and insomnia; which could lead to permanent hyperthermic brain damage, cardiovascular collapse, coma, or even death.  The patient/guardian are instructed to stop medications immediately and either contact this APRN/this office during regular office hours, or go to the emergency department/call 911, if they begin to experience any of the symptoms discussed.  The benefits and risks of the current medication regimen are discussed with the patient/guardian, and they feel that the benefits out weigh the risks.  The patient/guardian verbalized understanding and agreement in their own words.                VERBAL INFORMED CONSENT FOR MEDICATION:  The patient was educated that their proposed/prescribed psychotropic medication(s) has potential risks, side effects, adverse effects, and black box warnings; and these have been discussed with the patient.  The patient has been informed that their treatment and medication dosage is to be individualized, and may even be above or below the recommended range/dosage due to patient individualization and response, but medication is prescribed using a shared decision making approach, and no medication or dosage will be prescribed without the patient's verbal consent.  The reason for the use of the medication including any off label use and alternative modes of treatment other than or in addition to medication has been considered and discussed, the probable consequences of not receiving the proposed treatment have been discussed, and  any treatment side effects, black box warnings, and cautions associated with treatment have been discussed with the patient.  The patient is allowed ample time to openly discuss and ask questions regarding the proposed medication(s) and treatment plan and the patient verbalizes understanding the reasons for the use of the medication, its potential risks and benefits, other alternative treatment(s), and the probable consequences that may occur if the proposed medication is not given.  The patient has been given ample time to ask questions and study the information and find the information to be specific, accurate, and complete.  The patient gives verbal consent for the medication(s) proposed/prescribed, they verbalized understanding that they can refuse and withdraw consent at any time with the assistance of this APRN, and the patient has verbally confirmed that they are aware, and are willing, to take the prescribed medication and follow the treatment plan with the known possible risks, side effect, black box warnings, and any potential medication interactions, and the patient reports they will be worse off without this medication and treatment plan.  The patient is advised to contact this APRN/this office if any questions or concerns arise at any time (at [phone]), or call 911/go to the closest emergency department if needed or outside of office hours.         [facility] No Show Policy:  We understand unexpected circumstances arise; however, anytime you miss your appointment we are unable to provide you appropriate care.  In addition, each appointment missed could have been used to provide care for others.  We ask that you call at least 24 hours in advance to cancel or reschedule an appointment.  We would like to take this opportunity to remind you of our policy stating patients who miss THREE or more appointments without cancelling or rescheduling 24 hours in advance of the appointment  may be subject to cancellation of any further visits with our clinic and recommendation to seek in-person services/visits.    Please call [phone] to reschedule your appointment. If there are reasons that make it difficult for you to keep the appointments, please call and let us know how we can help.  Please understand that medication prescribing will not continue without seeing your provider.      [facility]'s No Show Policy reviewed with patient at today's visit. Patient verbalized understanding of this policy. Discussed with patient that in the event that there are three or more no show visits, it will be recommended that they pursue in-person services/visits as noncompliance with telehealth visits indicates that patient is not an appropriate candidate for telemedicine and would likely be more appropriate for in-person services/visits. Patient verbalizes understanding and is agreeable to this.             MEDS ORDERED DURING VISIT:  New Medications Ordered This Visit   Medications    buPROPion XL (WELLBUTRIN XL) 150 MG 24 hr tablet     Sig: Take 3 tablets by mouth Every Morning.     Dispense:  90 tablet     Refill:  3    Cariprazine HCl (VRAYLAR) 4.5 MG capsule capsule     Sig: Take 1 capsule by mouth Daily.     Dispense:  30 capsule     Refill:  3    traZODone (DESYREL) 50 MG tablet     Sig: Take 1-2 tablets by mouth Every Night.     Dispense:  60 tablet     Refill:  3       Return in about 4 months (around 1/12/2025), or if symptoms worsen or fail to improve, for Recheck.       Patient will follow-up in 4 months, highly encouraged the patient if she had any questions or concerns to contact the behavioral health virtual clinic for sooner appointment patient verbalized understanding.        Functional Status: Moderate impairment     Prognosis: Good with Ongoing Treatment              This document has been electronically signed by ECTOR Gerardo  September 12, 2024 13:19  EDT    Part of this note may be an electronic transcription/translation of spoken language to printed text using the Dragon Dictation System.

## 2024-09-27 ENCOUNTER — TELEMEDICINE (OUTPATIENT)
Dept: PSYCHIATRY | Facility: CLINIC | Age: 29
End: 2024-09-27
Payer: COMMERCIAL

## 2024-09-27 DIAGNOSIS — F33.1 MAJOR DEPRESSIVE DISORDER, RECURRENT EPISODE, MODERATE: ICD-10-CM

## 2024-09-27 DIAGNOSIS — F41.1 GENERALIZED ANXIETY DISORDER: Primary | ICD-10-CM

## 2024-09-27 DIAGNOSIS — F43.10 POST TRAUMATIC STRESS DISORDER (PTSD): ICD-10-CM

## 2024-10-24 ENCOUNTER — OFFICE VISIT (OUTPATIENT)
Dept: INTERNAL MEDICINE | Facility: CLINIC | Age: 29
End: 2024-10-24
Payer: COMMERCIAL

## 2024-10-24 VITALS
DIASTOLIC BLOOD PRESSURE: 80 MMHG | WEIGHT: 168.2 LBS | RESPIRATION RATE: 14 BRPM | HEIGHT: 64 IN | BODY MASS INDEX: 28.71 KG/M2 | SYSTOLIC BLOOD PRESSURE: 124 MMHG | HEART RATE: 80 BPM | TEMPERATURE: 97.7 F

## 2024-10-24 DIAGNOSIS — I10 PRIMARY HYPERTENSION: Primary | ICD-10-CM

## 2024-10-24 DIAGNOSIS — R06.83 SNORING: ICD-10-CM

## 2024-10-24 DIAGNOSIS — G43.009 MIGRAINE WITHOUT AURA AND WITHOUT STATUS MIGRAINOSUS, NOT INTRACTABLE: ICD-10-CM

## 2024-10-24 DIAGNOSIS — Z23 NEEDS FLU SHOT: ICD-10-CM

## 2024-10-24 PROCEDURE — 99214 OFFICE O/P EST MOD 30 MIN: CPT | Performed by: NURSE PRACTITIONER

## 2024-10-24 PROCEDURE — 90471 IMMUNIZATION ADMIN: CPT | Performed by: NURSE PRACTITIONER

## 2024-10-24 PROCEDURE — 90656 IIV3 VACC NO PRSV 0.5 ML IM: CPT | Performed by: NURSE PRACTITIONER

## 2024-10-24 NOTE — PROGRESS NOTES
Patient Name: Elicia Arndt  : 1995   MRN: [id number]     Chief Complaint:    Chief Complaint   Patient presents with    Hypertension     Follow up       History of Present Illness: Elicia Arndt is a 29 y.o. female.  History of Present Illness  The patient is a 29-year-old female who presents for evaluation of multiple medical concerns.    She has not yet been prescribed a CPAP machine, despite having undergone a sleep study. She reports that she has not received any communication regarding the results of this study. Her sleep quality remains poor, and she is currently taking trazodone to aid with this issue, although she does not find it particularly effective. She is open to undergoing an overnight sleep study.    Her blood pressure is well-managed, with readings typically in the 120s to 130s over 60s to 70s. She is currently on a regimen of hydrochlorothiazide 25 mg and losartan 50 mg daily, and monitors her blood pressure at home.    She experiences headaches almost daily, primarily located at the back of her head. These headaches are described as throbbing and are often accompanied by light sensitivity, nausea, and vomiting. She has been managing these headaches with ibuprofen and Tylenol but it not helping. She has failed sumatriptan and amitriptyline.  She has not found any relief from her headaches since starting nebivolol.    She is still seeing behavioral health. Mónica Grimaldo is on maternity leave. They have increased her dose of Vraylar and she has been doing better with that.       Subjective     Review of System: Review of Systems     Medications:     Current Outpatient Medications:     buPROPion XL (WELLBUTRIN XL) 150 MG 24 hr tablet, Take 3 tablets by mouth Every Morning., Disp: 90 tablet, Rfl: 3    Cariprazine HCl (VRAYLAR) 4.5 MG capsule capsule, Take 1 capsule by mouth Daily., Disp: 30 capsule, Rfl: 3    hydroCHLOROthiazide 25 MG tablet, TAKE 1 TABLET BY MOUTH DAILY, Disp: 90  "tablet, Rfl: 1    losartan (COZAAR) 50 MG tablet, TAKE 1 TABLET BY MOUTH DAILY, Disp: 90 tablet, Rfl: 1    nebivolol (BYSTOLIC) 5 MG tablet, TAKE 1 TABLET BY MOUTH DAILY, Disp: 30 tablet, Rfl: 3    traZODone (DESYREL) 50 MG tablet, Take 1-2 tablets by mouth Every Night., Disp: 60 tablet, Rfl: 3    ubrogepant (Ubrelvy) 100 MG tablet, Take one tablet with onset of headache; if symptoms persist or return, may repeat dose after >=2 hours., Disp: 9 tablet, Rfl: 0    Allergies:   Allergies   Allergen Reactions    Adhesive Tape Hives     Red and hives         Objective     Physical Exam:   Vital Signs:   Vitals:    10/24/24 0802   BP: 124/80   BP Location: Right arm   Patient Position: Sitting   Cuff Size: Adult   Pulse: 80   Resp: 14   Temp: 97.7 °F (36.5 °C)   TempSrc: Infrared   Weight: 76.3 kg (168 lb 3.2 oz)   Height: 162.6 cm (64\")   PainSc: 0-No pain     Body mass index is 28.87 kg/m².        Physical Exam  Constitutional:       General: She is not in acute distress.     Appearance: She is not ill-appearing.   HENT:      Head: Normocephalic.   Cardiovascular:      Rate and Rhythm: Normal rate and regular rhythm.      Heart sounds: Normal heart sounds. No murmur heard.  Pulmonary:      Breath sounds: Normal breath sounds.   Neurological:      General: No focal deficit present.      Mental Status: She is oriented to person, place, and time.   Psychiatric:         Mood and Affect: Mood normal.     Physical Exam         Results  Testing  Home sleep test was within normal limits.     Assessment / Plan      Assessment/Plan:   Diagnoses and all orders for this visit:    1. Primary hypertension (Primary)    2. Needs flu shot  -     Fluzone >6mos    3. Migraine without aura and without status migrainosus, not intractable  -     ubrogepant (Ubrelvy) 100 MG tablet; Take one tablet with onset of headache; if symptoms persist or return, may repeat dose after >=2 hours.  Dispense: 9 tablet; Refill: 0    4. Snoring     "   Assessment & Plan  1. Snoring  Despite snoring, the sleep test results were within normal limits. A message will be sent to Carmela Hernandes from sleep medicine to schedule an in-person sleep study or an overnight study in the hospital in [place]. Carmela Hernandes will contact her with the date and time. If no contact is made, she should inform me so I can follow up.    2. Hypertension.  Her blood pressure is well managed. She will maintain her current regimen of losartan 50 mg and hydrochlorothiazide 25 mg. She has been monitoring her blood pressure at home, which has been running around 120s-130s over 60s-70s.    3. migraine   She experiences daily headaches. Previous treatments including amitriptyline, beta blockers, and Imitrex have not been effective. Ubrelvy will be prescribed, to be taken at the onset of a headache and repeated after 2 hours if necessary. A prior authorization may be required. She should inform me if Ubrelvy is not tolerated or ineffective. If Ubrelvy is not approved or ineffective, alternative treatments will be considered. She should contact me sooner if necessary.     Follow-up  Return in February 2025 for follow-up after the sleep study.         Follow Up:   Return in about 3 months (around 2/3/2025) for Recheck.  Patient or patient representative verbalized consent for the use of Ambient Listening during the visit with  ECTOR Alvarado for chart documentation. 10/24/2024  12:15 EDT    ECTOR Alvarado  [facility]

## 2024-10-25 ENCOUNTER — TELEMEDICINE (OUTPATIENT)
Dept: PSYCHIATRY | Facility: CLINIC | Age: 29
End: 2024-10-25
Payer: COMMERCIAL

## 2024-10-25 DIAGNOSIS — F33.1 MAJOR DEPRESSIVE DISORDER, RECURRENT EPISODE, MODERATE: ICD-10-CM

## 2024-10-25 DIAGNOSIS — F43.10 POST TRAUMATIC STRESS DISORDER (PTSD): ICD-10-CM

## 2024-10-25 DIAGNOSIS — F41.1 GENERALIZED ANXIETY DISORDER: Primary | ICD-10-CM

## 2024-10-25 NOTE — PROGRESS NOTES
"[facility]   Follow-up Progress Note     Date: October 25, 2024  Time In: 10:01  Time Out: 10:19      PROGRESS NOTE  Data:  Elicia Arndt is a 29 y.o. female presenting to [facility] (through [facility]), [address] [place], [address] using a secure MyChart Video Visit through [facility] for assessment with Chas Ramirez LCSW. The patient is seen remotely in their  car  using [facility] My Chart. Patient is being seen via telehealth and stated they are in a secure environment for this session. The patient's condition being diagnosed/treated is appropriate for telemedicine. The provider identified herself as well as her credentials. The patient and/or patients guardian consent to be seen remotely, and when consent is given they understand that the consent allows for patient identifiable information to be sent to a third party as needed. They may refuse to be seen remotely at any time. The electronic data is encrypted and password protected, and the patient has been advised of the potential risks to privacy not withstanding such measures.    Today Patient expressed she has been doing \"alright.\" Patient expressed she is excited to be chaperoning her daughter's field trip today at the pumpkin patch. Patient stated both her daughters are feeling better. Patient stated her sister in law has been causing stress on the family. Patient was engaged in discussing how she can establish better boundaries. Patient stated school for her is going well. 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 Discussed with patient focusing on one task at a time such as first completing her speech then practicing her speech. Encouraged patient to practice giving her speech in the mirror to get accustom to speaking on camera.       Clinical Maneuvering/Intervention:    Chief Complaint: anxiety, trauma, " depression    (Scales based on 0 - 10 with 10 being the worst)  Depression: 0 Anxiety: 5     Assisted Patient in processing above session content; acknowledged and normalized patient’s thoughts, feelings, and concerns.  Rationalized patient thought process regarding issues with her sister in law and giving a speech for her class.  Discussed triggers associated with patient's anxiety.  Also discussed coping skills for patient to implement such as establishing boundaries and practicing speech.    Allowed Patient to freely discuss issues  without interruption or judgement with unconditional positive regard, active listening skills, and empathy. Therapist provided a safe, confidential environment to facilitate the development of a positive therapeutic relationship and encouraged open, honest communication. Assisted Patient in identifying risk factors which would indicate the need for higher level of care including thoughts to harm self or others and/or self-harming behavior and encouraged Patient to contact this office, call 911, or present to the nearest emergency room should any of these events occur. Discussed crisis intervention services and means to access. Patient adamantly and convincingly denies current suicidal or homicidal ideation or perceptual disturbance. Assisted Patient in processing session content; acknowledged and normalized Patient’s thoughts, feelings, and concerns by utilizing a person-centered approach in efforts to build appropriate rapport and a positive therapeutic relationship with open and honest communication. Therapist utilized dialectical behavior techniques to teach and model emotional regulation and relaxation methods. Therapist assisted Patient with identifying and implementing healthier coping strategies.     Assessment   Patient appears to be experiencing heightened anxiety and depression in response to COVID-19 epidemic  As a result, they can be reasonably expected to continue to  benefit from treatment and would likely be at increased risk for decompensation otherwise.    Mental Status Exam:   Hygiene:   good  Cooperation:  Cooperative  Eye Contact:  Good  Psychomotor Behavior:  Appropriate  Affect:  Full range  Mood:  silly  Speech:  Normal  Thought Process:  Goal directed  Thought Content:  Mood congruent  Suicidal:  None  Homicidal:  None  Hallucinations:  None  Delusion:  None  Memory:  Intact  Orientation:  Person, Place, Time, and Situation  Reliability:  good  Insight:  Good  Judgement:  Good  Impulse Control:  Good  Physical/Medical Issues:  No      PHQ-Score Total:  PHQ-9 Total Score:        Patient's Support Network Includes:   and mother    Functional Status: Moderate impairment     Progress toward goal: Not at goal    Prognosis: Good with Ongoing Treatment            Impression/Formulation:    VISIT DIAGNOSIS:     ICD-10-CM ICD-9-CM   1. Generalized anxiety disorder  F41.1 300.02   2. Major depressive disorder, recurrent episode, moderate  F33.1 296.32   3. Post traumatic stress disorder (PTSD)  F43.10 309.81        Patient appeared alert and oriented.  Patient is voluntarily requesting to continue outpatient therapy at [facility].  Patient is receptive to assistance with maintaining a stable lifestyle.  Patient presents with history of anxiety, depression, and past trauma.  Patient is agreeable to attend routine therapy sessions.  Patient expressed desire to maintain stability and participate in the therapeutic process.        Crisis Plan:  Symptoms and/or behaviors to indicate a crisis: Excessive worry or fear and Feeling sad or low    What calming techniques or other strategies will patient use to de-esclate and stay safe: slow down, breathe, visualize calming self, think it though, listen to music, change focus, take a walk    Who is one person patient can contact to assist with de-escalation? mother    If symptoms/behaviors  persist, Patient will present to the nearest hospital for an assessment. Advised patient of [facility] ER and assessment services.     Plan:   Patient will continue in individual outpatient therapy with focus on improved functioning and coping skills, maintaining stability, and avoiding decompensation and the need for higher level of care.    Patient will contact this office ([facility] at [phone]), call 911 or present to the nearest emergency room should suicidal or homicidal ideations occur. Provide Cognitive Behavioral Therapy and Solution Focused Therapy to improve functioning, maintain stability, and avoid decompensation and the need for higher level of care.     Return in about 4 weeks, or earlier if symptoms worsen or fail to improve.    Recommended Referrals: none        This document has been electronically signed by Chas Ramirez LCSW  October 25, 2024 10:00 EDT        Part of this note may be an electronic transcription/translation of spoken language to printed text using the Dragon Dictation System.

## 2024-11-11 RX ORDER — NEBIVOLOL 5 MG/1
5 TABLET ORAL DAILY
Qty: 30 TABLET | Refills: 3 | Status: SHIPPED | OUTPATIENT
Start: 2024-11-11

## 2024-11-11 RX ORDER — NEBIVOLOL 5 MG/1
5 TABLET ORAL DAILY
Qty: 30 TABLET | Refills: 3 | OUTPATIENT
Start: 2024-11-11

## 2024-11-22 ENCOUNTER — TELEMEDICINE (OUTPATIENT)
Dept: PSYCHIATRY | Facility: CLINIC | Age: 29
End: 2024-11-22
Payer: COMMERCIAL

## 2024-11-22 DIAGNOSIS — F33.1 MAJOR DEPRESSIVE DISORDER, RECURRENT EPISODE, MODERATE: ICD-10-CM

## 2024-11-22 DIAGNOSIS — F43.10 POST TRAUMATIC STRESS DISORDER (PTSD): ICD-10-CM

## 2024-11-22 DIAGNOSIS — F41.1 GENERALIZED ANXIETY DISORDER: Primary | ICD-10-CM
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
## 2024-11-22 NOTE — PLAN OF CARE
Patient was engaged in reviewing treatment goals. Patient expressed she has made significant progress towards decreasing anxiety symptoms by focusing on what is within patient's control. Patient expressed her depression has been increasing with the change of weather. Patient wants focus on maintaining consistent routines to support a stable mood.   Problem: Anxiety 4  Goal: Patient will develop and implement behavioral and cognitive strategies to reduce anxiety and irrational fears.  Outcome: Progressing     Problem: Depression 8  Goal: Patient will demonstrate the ability to initiate new constructive life skills outside of sessions on a consistent basis.  Outcome: Progressing

## 2024-11-25 ENCOUNTER — HOSPITAL ENCOUNTER (OUTPATIENT)
Dept: SLEEP MEDICINE | Facility: HOSPITAL | Age: 29
Discharge: HOME | End: 2024-11-25
Admitting: NURSE PRACTITIONER
Payer: COMMERCIAL

## 2024-11-25 DIAGNOSIS — R29.818 SUSPECTED SLEEP APNEA: ICD-10-CM

## 2024-11-25 DIAGNOSIS — R06.83 SNORING: ICD-10-CM

## 2024-11-25 DIAGNOSIS — G47.19 EXCESSIVE DAYTIME SLEEPINESS: ICD-10-CM

## 2024-11-25 PROCEDURE — 95810 POLYSOM 6/> YRS 4/> PARAM: CPT

## 2024-12-20 ENCOUNTER — TELEMEDICINE (OUTPATIENT)
Dept: PSYCHIATRY | Facility: CLINIC | Age: 29
End: 2024-12-20
Payer: COMMERCIAL

## 2024-12-20 DIAGNOSIS — F41.1 GENERALIZED ANXIETY DISORDER: Primary | ICD-10-CM

## 2024-12-20 DIAGNOSIS — F33.1 MODERATE EPISODE OF RECURRENT MAJOR DEPRESSIVE DISORDER: ICD-10-CM

## 2024-12-20 DIAGNOSIS — F43.10 POST TRAUMATIC STRESS DISORDER (PTSD): ICD-10-CM

## 2024-12-20 NOTE — PROGRESS NOTES
"[facility]   Follow-up Progress Note     Date: December 20, 2024  Time In: 10:01  Time Out: 10:41      PROGRESS NOTE  Data:  Elicia Arndt is a 29 y.o. female presenting to [facility] (through [facility]), [address] [place], [address] using a secure MyChart Video Visit through [facility] for assessment with Chas Ramirez LCSW. The patient is seen remotely in their  home  using [facility] My Chart. Patient is being seen via telehealth and stated they are in a secure environment for this session. The patient's condition being diagnosed/treated is appropriate for telemedicine. The provider identified herself as well as her credentials. The patient and/or patients guardian consent to be seen remotely, and when consent is given they understand that the consent allows for patient identifiable information to be sent to a third party as needed. They may refuse to be seen remotely at any time. The electronic data is encrypted and password protected, and the patient has been advised of the potential risks to privacy not withstanding such measures.    Today Patient stated she has been doing well. Patient stated she finished her classes for the semester. Patient stated she passed one class but the other she was not satisfied with her outcome. Patient stated she will likely have to retake that course. Patient stated her life has been \"less chaotic\" which has been enjoyable. Patient expressed she is hopeful that the chaos stays away. Patient stated her father broke up with his girlfriend. Patient stated she is hopeful that she will be able to work on repairing her relationship with her father. Discussed with patient maintaining consistent boundaries as she works to rebuild their relationship.       Clinical Maneuvering/Intervention:    Chief Complaint: anxiety, trauma, depression    (Scales based on 0 - 10 with 10 being " the worst)  Depression: 0 Anxiety: 2     Assisted Patient in processing above session content; acknowledged and normalized patient’s thoughts, feelings, and concerns.  Rationalized patient thought process regarding rebuilding her relationship with her father.  Discussed triggers associated with patient's anxiety.  Also discussed coping skills for patient to implement such as maintaining boundaries.    Allowed Patient to freely discuss issues  without interruption or judgement with unconditional positive regard, active listening skills, and empathy. Therapist provided a safe, confidential environment to facilitate the development of a positive therapeutic relationship and encouraged open, honest communication. Assisted Patient in identifying risk factors which would indicate the need for higher level of care including thoughts to harm self or others and/or self-harming behavior and encouraged Patient to contact this office, call 911, or present to the nearest emergency room should any of these events occur. Discussed crisis intervention services and means to access. Patient adamantly and convincingly denies current suicidal or homicidal ideation or perceptual disturbance. Assisted Patient in processing session content; acknowledged and normalized Patient’s thoughts, feelings, and concerns by utilizing a person-centered approach in efforts to build appropriate rapport and a positive therapeutic relationship with open and honest communication. Therapist utilized dialectical behavior techniques to teach and model emotional regulation and relaxation methods. Therapist assisted Patient with identifying and implementing healthier coping strategies.     Assessment   Patient appears to be experiencing heightened anxiety and depression in response to COVID-19 epidemic  As a result, they can be reasonably expected to continue to benefit from treatment and would likely be at increased risk for decompensation  otherwise.    Mental Status Exam:   Hygiene:   good  Cooperation:  Cooperative  Eye Contact:  Good  Psychomotor Behavior:  Appropriate  Affect:  Full range  Mood:  content  Speech:  Normal  Thought Process:  Goal directed  Thought Content:  Mood congruent  Suicidal:  None  Homicidal:  None  Hallucinations:  None  Delusion:  None  Memory:  Intact  Orientation:  Person, Place, Time, and Situation  Reliability:  good  Insight:  Good  Judgement:  Good  Impulse Control:  Good  Physical/Medical Issues:  No      PHQ-Score Total:  PHQ-9 Total Score:        Patient's Support Network Includes:   and mother    Functional Status: Moderate impairment     Progress toward goal: Not at goal    Prognosis: Good with Ongoing Treatment            Impression/Formulation:    VISIT DIAGNOSIS:     ICD-10-CM ICD-9-CM   1. Generalized anxiety disorder  F41.1 300.02   2. Moderate episode of recurrent major depressive disorder  F33.1 296.32   3. Post traumatic stress disorder (PTSD)  F43.10 309.81        Patient appeared alert and oriented.  Patient is voluntarily requesting to continue outpatient therapy at [facility].  Patient is receptive to assistance with maintaining a stable lifestyle.  Patient presents with history of anxiety, depression, and past trauma.  Patient is agreeable to attend routine therapy sessions.  Patient expressed desire to maintain stability and participate in the therapeutic process.        Crisis Plan:  Symptoms and/or behaviors to indicate a crisis: Excessive worry or fear and Feeling sad or low    What calming techniques or other strategies will patient use to de-esclate and stay safe: slow down, breathe, visualize calming self, think it though, listen to music, change focus, take a walk    Who is one person patient can contact to assist with de-escalation? mother    If symptoms/behaviors persist, Patient will present to the nearest hospital for an assessment. Advised  patient of [facility] ER and assessment services.     Plan:   Patient will continue in individual outpatient therapy with focus on improved functioning and coping skills, maintaining stability, and avoiding decompensation and the need for higher level of care.    Patient will contact this office ([facility] at [phone]), call 911 or present to the nearest emergency room should suicidal or homicidal ideations occur. Provide Cognitive Behavioral Therapy and Solution Focused Therapy to improve functioning, maintain stability, and avoid decompensation and the need for higher level of care.     Return in about 4 weeks, or earlier if symptoms worsen or fail to improve.    Recommended Referrals: none        This document has been electronically signed by Chas Ramirez LCSW  December 20, 2024 10:01 EST        Part of this note may be an electronic transcription/translation of spoken language to printed text using the Dragon Dictation System.    Mode of Visit: Video  Location of patient: -HOME-  Location of provider: +HOME+  You have chosen to receive care through a telehealth visit.  The patient has signed the video visit consent form.  The visit included audio and video interaction. No technical issues occurred during this visit.

## 2025-01-07 ENCOUNTER — TELEMEDICINE (OUTPATIENT)
Dept: PSYCHIATRY | Facility: CLINIC | Age: 30
End: 2025-01-07
Payer: COMMERCIAL

## 2025-01-07 DIAGNOSIS — F39 UNSPECIFIED MOOD (AFFECTIVE) DISORDER: Primary | Chronic | ICD-10-CM

## 2025-01-07 DIAGNOSIS — F41.1 GENERALIZED ANXIETY DISORDER: Chronic | ICD-10-CM

## 2025-01-07 DIAGNOSIS — G47.00 INSOMNIA, UNSPECIFIED TYPE: Chronic | ICD-10-CM

## 2025-01-07 RX ORDER — TRAZODONE HYDROCHLORIDE 150 MG/1
150 TABLET ORAL NIGHTLY
Qty: 30 TABLET | Refills: 0 | Status: SHIPPED | OUTPATIENT
Start: 2025-01-07

## 2025-01-07 RX ORDER — BUPROPION HYDROCHLORIDE 150 MG/1
450 TABLET ORAL EVERY MORNING
Qty: 90 TABLET | Refills: 0 | Status: SHIPPED | OUTPATIENT
Start: 2025-01-07

## 2025-01-07 NOTE — PROGRESS NOTES
"This provider is located at home office working remotely through the [facility] (through [facility] [place] [facility]), [address], [place], [address] using a secure AuditFilehart Video Visit through [employer]. Patient is being seen remotely via telehealth at their home addrehss in [place], and stated they are in a secure environment for this session. The patient's condition being diagnosed/treated is appropriate for telemedicine. The provider identified herself as well as her credentials. The patient, and/or patients guardian, consent to be seen remotely, and when consent is given they understand that the consent allows for patient identifiable information to be sent to a third party as needed. They may refuse to be seen remotely at any time. The electronic data is encrypted and password protected, and the patient and/or guardian has been advised of the potential risks to privacy not withstanding such measures.    You have chosen to receive care through a telehealth visit.  Do you consent to use a video/audio connection for your medical care today? Yes     Patient identifiers utilized: Name and date of birth.    Patient verbally confirmed consent for today's encounter  01/07/2025 .    The patient does verbally confirm they are being seen today while physically located in the [facility].  This provider/this APRN is licensed in the [facility] where the patient is located/being seen.           Subjective   Elicia Arndt is a 29 y.o. female who is here today for medication management follow up.    Chief Complaint:  Depression, anxiety, insomnia     History of Present Illness:  -Since last encounter with this APRN/Office: The patient reports that overall she has been doing well over the past few months.  She states that past couple of weeks she has felt \"down in the dumps\".  She states that she lost her great aunt past Monday and that she thinks this is why she has felt so down.  " "She states that her anxiety symptoms have been minimal recently, which she endorses she is very pleased with.  -Mood reported as: \"down in the dumps\"  -Patient rates symptoms of depression at a 8/10 on a 0-10 scale, with 10 being the worst.    -Patient rates symptoms of anxiety at a 2/10 on a 0-10 scale, with 10 being the worst.      -Appetite reported as: \"pretty good\"  -Sleep reported as: \"not good\".  Reports she has difficulty both falling and staying asleep currently.  Estimates that she is sleeping approximately 4 hours per night.  Reports that she often awakens throughout the night but can typically fall back asleep easily.  Reports often feeling tired and fatigued due to sleeping difficulties.  States that she is taking trazodone 100 mg nightly and that this dose is no longer seems to be as effective for her as it used to be.    -Changes in medications or new medical problems/concerns since last visit: Denies  -Reported medication compliance: The patient reports compliance with their current psychotropic medication regimen.    -Reported medication side effects or concerns: Denies    -Auditory or visual hallucinations: Reports daily AVH, but states that this has been her baseline for over 15 years now.  Denies any recent deviation from baseline or any other symptoms of psychosis.  -Behaviors different from patient baseline, or any reckless, impulsive, or risky behaviors: Denies  -Symptoms of more or psychosis: Denies  -Self-injurious behavior: Denies  -SI/HI: The patient adamantly denies any suicidal or homicidal ideations, plans, or intent at the time of this encounter and is convincing.  -No abnormal muscle movements or tics noted by this APRN during today's encounter, and the patient denies any of these symptoms.      -Using a shared decision-making approach the patient reports she would like to trial an increased dose of trazodone for further management of insomnia at this time.  The patient states that " otherwise they would like to continue their current treatment/medication regimen without any adjustments/changes at this time.  When discussing medication efficacy with the patient, and reassessing the need and appropriateness of continued psychotropic medication treatment and doses, they report they are pleased with management of symptoms (with the exception of insomnia) at this time, and that their current treatment/medication regimen has continued to be effective for them and they do not want to make any changes or adjustments at today's encounter.    -The patient does verbally contract for safety at today's encounter and is in verbal agreement with the safety/crisis plan. The patient reports in their own words that they will reach out to this APRN/office prior to next scheduled appointment if there is any worsening of mood, any new psychiatric symptoms, any medication side effects or concerns, any concern for safety to self or others, any suicidal or homicidal ideations plans or intent, or any concerns, or they will call 911, call or text the suicide and crisis lifeline at [phone], or go to the closest emergency department.          Last Menstrual Period:  Reports she doesn't have regular menstrual cycles due to her Nexplanon.    The patient was educated that her prescribed medications can have potential risk to a developing fetus. The patient is advised to contact this APRN/this office if she becomes pregnant or plans to become pregnant.  Pt verbalizes understanding and acknowledged agreement with this plan in her own words.     Prior Psychiatric Medications:  Effexor - reports ineffective    The following portions of the patient's history were reviewed and updated as appropriate: allergies, current medications, past family history, past medical history, past social history, past surgical history, and problem list.    Review of Systems   Constitutional:  Positive for fatigue. Negative for activity change, appetite  change and unexpected weight change.   Psychiatric/Behavioral:  Positive for dysphoric mood, hallucinations (Reports daily AVH, but states that this has been her baseline for over 15 years now.  Denies any recent deviation from baseline or any other symptoms of psychosis.) and sleep disturbance. Negative for agitation, behavioral problems, confusion, decreased concentration, self-injury and suicidal ideas. The patient is nervous/anxious. The patient is not hyperactive.        Objective   Physical Exam  Constitutional:       Appearance: Normal appearance.   Neurological:      Mental Status: She is alert.   Psychiatric:         Attention and Perception: Attention normal. She perceives auditory (Reports daily AVH, but states that this has been her baseline for over 15 years now.  Denies any recent deviation from baseline or any other symptoms of psychosis.) and visual (Reports daily AVH, but states that this has been her baseline for over 15 years now.  Denies any recent deviation from baseline or any other symptoms of psychosis.) hallucinations.         Mood and Affect: Affect normal. Mood is depressed.         Speech: Speech normal.         Behavior: Behavior normal. Behavior is cooperative.         Thought Content: Thought content normal. Thought content is not paranoid or delusional. Thought content does not include homicidal or suicidal ideation. Thought content does not include homicidal or suicidal plan.         Cognition and Memory: Cognition and memory normal.         Judgment: Judgment normal.       not currently breastfeeding.    The patient was seen remotely today via a MyCEyeICt Video Visit through [employer].  Unable to obtain vital signs due to nature of remote visit.  Height stated at 64 inches.  Weight stated at 168 pounds.     Due to the nature of virtual visits and inability to monitor vital signs and weight with virtual visits, the patient has been encouraged to monitor their vital signs and weight  regularly either through self-monitoring via home device(s) or with their Primary Care Provider, and the patient has been instructed to notify this APRN of any abnormalities or significant changes from baseline.       Allergies   Allergen Reactions    Adhesive Tape Hives     Red and hives         No results found for this or any previous visit (from the past 12 weeks).    Current Medications:   Current Outpatient Medications   Medication Sig Dispense Refill    buPROPion XL (WELLBUTRIN XL) 150 MG 24 hr tablet Take 3 tablets by mouth Every Morning. 90 tablet 0    Cariprazine HCl (VRAYLAR) 4.5 MG capsule capsule Take 1 capsule by mouth Daily. 30 capsule 0    hydroCHLOROthiazide 25 MG tablet TAKE 1 TABLET BY MOUTH DAILY 90 tablet 1    losartan (COZAAR) 50 MG tablet TAKE 1 TABLET BY MOUTH DAILY 90 tablet 1    nebivolol (BYSTOLIC) 5 MG tablet Take 1 tablet by mouth Daily. 30 tablet 3    traZODone (DESYREL) 150 MG tablet Take 1 tablet by mouth Every Night. 30 tablet 0    ubrogepant (Ubrelvy) 100 MG tablet Take one tablet with onset of headache; if symptoms persist or return, may repeat dose after >=2 hours. 9 tablet 0     No current facility-administered medications for this visit.       Mental Status Exam:   Hygiene:   good  Cooperation:  Cooperative  Eye Contact:  Good  Psychomotor Behavior:  Appropriate  Affect:  Full range  Hopelessness: Denies  Speech:  Normal  Thought Process:  Goal directed and Linear  Thought Content:  Normal  Suicidal:  None  Homicidal:  None  Hallucinations:   Reports daily AVH, but states that this has been her baseline for over 15 years now.  Denies any recent deviation from baseline or any other symptoms of psychosis.  Delusion:  None  Memory:  Intact  Orientation:  Person, Place, Time, and Situation  Reliability:  good  Insight:  Good  Judgement:  Good  Impulse Control:  Good  Physical/Medical Issues:  Yes see medical history        PHQ-9 Depression Screening  Little interest or pleasure in  doing things? (Patient-Rptd) Over half   Feeling down, depressed, or hopeless? (Patient-Rptd) Several days   PHQ-2 Total Score (Patient-Rptd) 3   Trouble falling or staying asleep, or sleeping too much? (Patient-Rptd) Over half   Feeling tired or having little energy? (Patient-Rptd) Several days   Poor appetite or overeating? (Patient-Rptd) Not at all   Feeling bad about yourself - or that you are a failure or have let yourself or your family down? (Patient-Rptd) Over half   Trouble concentrating on things, such as reading the newspaper or watching television? (Patient-Rptd) Over half   Moving or speaking so slowly that other people could have noticed? Or the opposite - being so fidgety or restless that you have been moving around a lot more than usual? (Patient-Rptd) Several days   Thoughts that you would be better off dead, or of hurting yourself in some way? (Patient-Rptd) Several days   PHQ-9 Total Score (Patient-Rptd) 12   If you checked off any problems, how difficult have these problems made it for you to do your work, take care of things at home, or get along with other people? (Patient-Rptd) Somewhat difficult           DREW-7  Feeling nervous, anxious or on edge: (Patient-Rptd) Several days  Not being able to stop or control worrying: (Patient-Rptd) Several days  Worrying too much about different things: (Patient-Rptd) Several days  Trouble Relaxing: (Patient-Rptd) Several days  Being so restless that it is hard to sit still: (Patient-Rptd) Several days  Feeling afraid as if something awful might happen: (Patient-Rptd) Several days  Becoming easily annoyed or irritable: (Patient-Rptd) Several days  DREW 7 Total Score: (Patient-Rptd) 7  If you checked any problems, how difficult have these problems made it for you to do your work, take care of things at home, or get along with other people: (Patient-Rptd) Somewhat difficult          Assessment & Plan   Diagnoses and all orders for this visit:    1.  Unspecified mood (affective) disorder (Primary)  -     buPROPion XL (WELLBUTRIN XL) 150 MG 24 hr tablet; Take 3 tablets by mouth Every Morning.  Dispense: 90 tablet; Refill: 0  -     Cariprazine HCl (VRAYLAR) 4.5 MG capsule capsule; Take 1 capsule by mouth Daily.  Dispense: 30 capsule; Refill: 0  -     traZODone (DESYREL) 150 MG tablet; Take 1 tablet by mouth Every Night.  Dispense: 30 tablet; Refill: 0    2. Generalized anxiety disorder  -     buPROPion XL (WELLBUTRIN XL) 150 MG 24 hr tablet; Take 3 tablets by mouth Every Morning.  Dispense: 90 tablet; Refill: 0  -     traZODone (DESYREL) 150 MG tablet; Take 1 tablet by mouth Every Night.  Dispense: 30 tablet; Refill: 0    3. Insomnia, unspecified type  -     traZODone (DESYREL) 150 MG tablet; Take 1 tablet by mouth Every Night.  Dispense: 30 tablet; Refill: 0            Visit Diagnoses:    ICD-10-CM ICD-9-CM   1. Unspecified mood (affective) disorder  F39 296.90   2. Generalized anxiety disorder  F41.1 300.02   3. Insomnia, unspecified type  G47.00 780.52       GOALS:  Short Term Goals: Patient will be compliant with medication, and patient will have no significant medication related side effects.  Patient will be engaged in psychotherapy as indicated.  Patient will report subjective improvement of symptoms.  Long term goals: To stabilize mood and treat/improve subjective symptoms, the patient will stay out of the hospital, the patient will be at an optimal level of functioning, and the patient will take all medications as prescribed.  The patient verbalized understanding and agreement with goals that were mutually set.      SUICIDE RISK ASSESSMENT: Unalterable demographics and a history of mental health intervention indicate this patient is in a high risk category compared to the general population. At present, the patient denies active SI/HI, intentions, or plans at this time and agrees to seek immediate care should such thoughts develop. The patient verbalizes  understanding of how to access emergency care if needed and agrees to do so. Consideration of suicide risk and protective factors such as history, current presentation, individual strengths and weaknesses, psychosocial and environmental stressors and variables, psychiatric illness and symptoms, medical conditions and pain, took place in this interview. Based on those considerations, the patient is determined: within individual baseline and presenting no imminent risk for suicide or homicide. Other recommendations: The patient does not meet the criteria for inpatient admission and is not a safety risk to self or others at today's visit. Inpatient treatment offers no significant advantages over outpatient treatment for this patient at today's visit.      SAFETY PLAN:  Patient was given ample time for questions and fully participated in treatment planning.  Patient was encouraged to call the clinic with any questions or concerns.  Patient was informed of access to emergency care. If patient were to develop any significant symptomatology, suicidal ideation, homicidal ideation, any concerns, or feel unsafe at any time they are to call the clinic and if unable to get immediate assistance should immediately call 911 or go to the nearest emergency room.  The patient is advised to remove or secure (lock away) all lethal weapons (including guns) and sharps (including razors, scissors, knives, etc.).  All medications (including any prescribed and any over the counter medications) should be stored in a safe and secured location that is not obtainable by children/adolescents.  Patient was given an opportunity and encouraged to ask questions about their medication, illness, and treatment. Patient contracted verbally for the following: If you are experiencing an emotional crisis or have thoughts of harming yourself or others, please go to your nearest local emergency room or call 911. Will continue to re-assess medication response  and side effects frequently to establish efficacy and ensure safety. Risks, any black box warnings, side effects, off label usage, and benefits of medication and treatment discussed with patient, along with potential adverse side effects of current and/or newly prescribed medication, alternative treatment options, and OTC medications.  Patient verbalized understanding of potential risks, any off label use of medication, any black box warnings, and any side effects in their own words. The patient verbalized understanding and agreed to comply with the safety plan discussed in their own words.  Patient given the number to the office. Number also available to the 24- hour suicide hotline.       TREATMENT PLAN/GOALS: Continue medications and treatment plan as indicated. Treatment and medication options discussed during today's visit. Patient acknowledged and verbally consented to continue with current treatment plan and was educated on the importance of compliance with treatment and follow-up appointments.        -Increase Trazodone to 150 mg nightly for sleep  -Continue Wellbutrin  mg daily for mood/anxiety  -Continue Vraylar 4.5 mg daily as adjunct for mood/anxiety  -Continue psychotherapy with Chas Ramirez LCSW  -Rule out psychotic disorder potentially contributing to reported hallucinations        MEDICATION ISSUES: Discussed medication options and treatment plan of prescribed medication, any off label use of medication, as well as the risks, benefits, any black box warnings including increased suicidality, and side effects including but not limited to potential falls, dizziness, possible impaired driving, GI side effects (change in appetite, abdominal discomfort, nausea, vomiting, diarrhea, and/or constipation), dry mouth, somnolence, sedation, insomnia, activation, agitation, irritation, tremors, abnormal muscle movements or disorders, tardive dyskinesia, akathisia, asthenia, headache, sweating, possible  bruising or rare bleeding, electrolyte and/or fluid abnormalities, change in blood pressure/heart rate/and or heart rhythm, hypotension, sexual dysfunction, rare impulse control problems, rare seizures, rare neuroleptic malignant syndrome, increased risk of death and cerebrovascular events, change in blood glucose and increased risk for diabetes, change in triglycerides and cholesterol and increased risk for dyslipidemia,  weight gain, weight gain that can become problematic to health, skin conditions and reactions, and metabolic adversities among others. Patient and/or guardian are agreeable to call the office with any worsening of symptoms or onset of side effects, or if any concerns or questions arise.  The contact information for the office is made available to the patient and/or guardian. Patient and/or guardian are agreeable to call 911 or go to the nearest ER should they begin having any SI/HI, or if any urgent concerns arise. No medication side effects or related complaints today.    This APRN has discussed with the patient/guardian about the possibility of serotonin syndrome when certain medications are taken together, as is the case with this patient.  This APRN has provided the patient/guardian with a list of symptoms of serotonin syndrome including symptoms of autonomic instability, altered sensorium, confusion, restlessness, agitation, myoclonus, hyperreflexia, hyperthermia, diaphoresis, tremor, chills, diarrhea and cramps, ataxia, headache, migraines, seizures, and insomnia; which could lead to permanent hyperthermic brain damage, cardiovascular collapse, coma, or even death.  The patient/guardian are instructed to stop medications immediately and either contact this APRN/this office during regular office hours, or go to the emergency department/call 911, if they begin to experience any of the symptoms discussed.  The benefits and risks of the current medication regimen are discussed with the  patient/guardian, and they feel that the benefits out weigh the risks.  The patient/guardian verbalized understanding and agreement in their own words.              VERBAL INFORMED CONSENT FOR MEDICATION:  The patient was educated that their proposed/prescribed psychotropic medication(s) has potential risks, side effects, adverse effects, and black box warnings; and these have been discussed with the patient.  The patient has been informed that their treatment and medication dosage is to be individualized, and may even be above or below the recommended range/dosage due to patient individualization and response, but medication is prescribed using a shared decision making approach, and no medication or dosage will be prescribed without the patient's verbal consent.  The reason for the use of the medication including any off label use and alternative modes of treatment other than or in addition to medication has been considered and discussed, the probable consequences of not receiving the proposed treatment have been discussed, and any treatment side effects, black box warnings, and cautions associated with treatment have been discussed with the patient.  The patient is allowed ample time to openly discuss and ask questions regarding the proposed medication(s) and treatment plan and the patient verbalizes understanding the reasons for the use of the medication, its potential risks and benefits, other alternative treatment(s), and the probable consequences that may occur if the proposed medication is not given.  The patient has been given ample time to ask questions and study the information and find the information to be specific, accurate, and complete.  The patient gives verbal consent for the medication(s) proposed/prescribed, they verbalized understanding that they can refuse and withdraw consent at any time with the assistance of this APRN, and the patient has verbally confirmed that they are aware, and are  willing, to take the prescribed medication and follow the treatment plan with the known possible risks, side effect, black box warnings, and any potential medication interactions, and the patient reports they will be worse off without this medication and treatment plan.  The patient is advised to contact this APRN/this office if any questions or concerns arise at any time (at [phone]), or call 911/go to the closest emergency department if needed or outside of office hours.       [facility] No Show Policy:  We understand unexpected circumstances arise; however, anytime you miss your appointment we are unable to provide you appropriate care.  In addition, each appointment missed could have been used to provide care for others.  We ask that you call at least 24 hours in advance to cancel or reschedule an appointment.  We would like to take this opportunity to remind you of our policy stating patients who miss THREE or more appointments without cancelling or rescheduling 24 hours in advance of the appointment may be subject to cancellation of any further visits with our clinic and recommendation to seek in-person services/visits.    Please call [phone] to reschedule your appointment. If there are reasons that make it difficult for you to keep the appointments, please call and let us know how we can help.  Please understand that medication prescribing will not continue without seeing your provider.      [facility]'s No Show Policy reviewed with patient at today's visit. Patient verbalized understanding of this policy. Discussed with patient that in the event that there are three or more no show visits, it will be recommended that they pursue in-person services/visits as noncompliance with telehealth visits indicates that patient is not an appropriate candidate for telemedicine and would likely be more appropriate for in-person services/visits. Patient verbalizes  understanding and is agreeable to this.           MEDS ORDERED DURING VISIT:  New Medications Ordered This Visit   Medications    buPROPion XL (WELLBUTRIN XL) 150 MG 24 hr tablet     Sig: Take 3 tablets by mouth Every Morning.     Dispense:  90 tablet     Refill:  0    Cariprazine HCl (VRAYLAR) 4.5 MG capsule capsule     Sig: Take 1 capsule by mouth Daily.     Dispense:  30 capsule     Refill:  0    traZODone (DESYREL) 150 MG tablet     Sig: Take 1 tablet by mouth Every Night.     Dispense:  30 tablet     Refill:  0       Return in about 4 weeks (around 2/4/2025), or if symptoms worsen or fail to improve, for Recheck.       Patient will follow-up in 4 weeks, highly encouraged the patient if she had any questions or concerns to contact the behavioral health [facility] clinic for sooner appointment patient verbalized understanding.      Functional Status: Moderate impairment     Prognosis: Good with Ongoing Treatment             This document has been electronically signed by ECTOR Gerardo  January 7, 2025 13:58 EST      Some of the data in this electronic note has been brought forward from a previous encounter, any necessary changes have been made, it has been reviewed by this APRN, and it is accurate.       Part of this note may be an electronic transcription/translation of spoken language to printed text using the Dragon Dictation System.

## 2025-01-17 ENCOUNTER — TELEMEDICINE (OUTPATIENT)
Dept: PSYCHIATRY | Facility: CLINIC | Age: 30
End: 2025-01-17
Payer: COMMERCIAL

## 2025-01-17 DIAGNOSIS — F43.10 POST TRAUMATIC STRESS DISORDER (PTSD): ICD-10-CM

## 2025-01-17 DIAGNOSIS — F41.1 GENERALIZED ANXIETY DISORDER: Primary | ICD-10-CM

## 2025-01-17 DIAGNOSIS — I10 HYPERTENSION, UNSPECIFIED TYPE: ICD-10-CM

## 2025-01-17 DIAGNOSIS — F33.1 MAJOR DEPRESSIVE DISORDER, RECURRENT EPISODE, MODERATE: ICD-10-CM

## 2025-01-17 RX ORDER — HYDROCHLOROTHIAZIDE 25 MG/1
25 TABLET ORAL DAILY
Qty: 90 TABLET | Refills: 1 | Status: SHIPPED | OUTPATIENT
Start: 2025-01-17

## 2025-01-17 RX ORDER — LOSARTAN POTASSIUM 50 MG/1
50 TABLET ORAL DAILY
Qty: 90 TABLET | Refills: 1 | Status: SHIPPED | OUTPATIENT
Start: 2025-01-17

## 2025-01-17 NOTE — PROGRESS NOTES
"[facility]   Follow-up Progress Note     Date: January 17, 2025  Time In: 11:00  Time Out: 11:43      PROGRESS NOTE  Data:  Elicia Arndt is a 29 y.o. female presenting to [facility] (through [facility]), [address] [place], [address] using a secure MyChart Video Visit through [facility] for assessment with Chas Ramirez LCSW. The patient is seen remotely in their  home  using [facility] My Chart. Patient is being seen via telehealth and stated they are in a secure environment for this session. The patient's condition being diagnosed/treated is appropriate for telemedicine. The provider identified herself as well as her credentials. The patient and/or patients guardian consent to be seen remotely, and when consent is given they understand that the consent allows for patient identifiable information to be sent to a third party as needed. They may refuse to be seen remotely at any time. The electronic data is encrypted and password protected, and the patient has been advised of the potential risks to privacy not withstanding such measures.    Today Patient stated she is grateful her children are back in school. Patient stated her nerves are \"shook\" this morning due to her father in law currently having open heart surgery. Patient stated she and her  plan to go later when their children are out of school. Patient stated her sister in law \"has done a complete 360.\" Patient stated she is sober and has been living with patient's in law. Patient stated she has allowed her to see her daughters under her supervision but plans to continue to maintain strict boundaries. Patient stated she has also started back school. Patient stated she is trying to be more proactive with her school work and ask questions. Patient stated she has been working on stopping smoking. Patient was engaged in discussing strategies to " decrease smoking. Engaged patient in identifying in being aware of her emotions when she smokes and if it is a behavior connected to a specific emotion.       Clinical Maneuvering/Intervention:    Chief Complaint: anxiety, trauma, depression    (Scales based on 0 - 10 with 10 being the worst)  Depression: 3 Anxiety: 5     Assisted Patient in processing above session content; acknowledged and normalized patient’s thoughts, feelings, and concerns.  Rationalized patient thought process regarding stopping smoking.  Discussed triggers associated with patient's anxiety.  Also discussed coping skills for patient to implement such as setting small goals to decrease behavior.    Allowed Patient to freely discuss issues  without interruption or judgement with unconditional positive regard, active listening skills, and empathy. Therapist provided a safe, confidential environment to facilitate the development of a positive therapeutic relationship and encouraged open, honest communication. Assisted Patient in identifying risk factors which would indicate the need for higher level of care including thoughts to harm self or others and/or self-harming behavior and encouraged Patient to contact this office, call 911, or present to the nearest emergency room should any of these events occur. Discussed crisis intervention services and means to access. Patient adamantly and convincingly denies current suicidal or homicidal ideation or perceptual disturbance. Assisted Patient in processing session content; acknowledged and normalized Patient’s thoughts, feelings, and concerns by utilizing a person-centered approach in efforts to build appropriate rapport and a positive therapeutic relationship with open and honest communication. Therapist utilized dialectical behavior techniques to teach and model emotional regulation and relaxation methods. Therapist assisted Patient with identifying and implementing healthier coping strategies.      Assessment   Patient appears to be experiencing heightened anxiety and depression in response to COVID-19 epidemic  As a result, they can be reasonably expected to continue to benefit from treatment and would likely be at increased risk for decompensation otherwise.    Mental Status Exam:   Hygiene:   good  Cooperation:  Cooperative  Eye Contact:  Good  Psychomotor Behavior:  Appropriate  Affect:  Full range  Mood:  anxious  Speech:  Normal  Thought Process:  Goal directed  Thought Content:  Mood congruent  Suicidal:  None  Homicidal:  None  Hallucinations:  None  Delusion:  None  Memory:  Intact  Orientation:  Person, Place, Time, and Situation  Reliability:  good  Insight:  Good  Judgement:  Good  Impulse Control:  Good  Physical/Medical Issues:  No      PHQ-Score Total:  PHQ-9 Total Score:        Patient's Support Network Includes:   and mother    Functional Status: Moderate impairment     Progress toward goal: Not at goal    Prognosis: Good with Ongoing Treatment            Impression/Formulation:    VISIT DIAGNOSIS:     ICD-10-CM ICD-9-CM   1. Generalized anxiety disorder  F41.1 300.02   2. Major depressive disorder, recurrent episode, moderate  F33.1 296.32   3. Post traumatic stress disorder (PTSD)  F43.10 309.81        Patient appeared alert and oriented.  Patient is voluntarily requesting to continue outpatient therapy at [facility].  Patient is receptive to assistance with maintaining a stable lifestyle.  Patient presents with history of anxiety, depression, and past trauma.  Patient is agreeable to attend routine therapy sessions.  Patient expressed desire to maintain stability and participate in the therapeutic process.        Crisis Plan:  Symptoms and/or behaviors to indicate a crisis: Excessive worry or fear and Feeling sad or low    What calming techniques or other strategies will patient use to de-esclate and stay safe: slow down, breathe, visualize  calming self, think it though, listen to music, change focus, take a walk    Who is one person patient can contact to assist with de-escalation? mother    If symptoms/behaviors persist, Patient will present to the nearest hospital for an assessment. Advised patient of [facility] ER and assessment services.     Plan:   Patient will continue in individual outpatient therapy with focus on improved functioning and coping skills, maintaining stability, and avoiding decompensation and the need for higher level of care.    Patient will contact this office ([facility] at [phone]), call 911 or present to the nearest emergency room should suicidal or homicidal ideations occur. Provide Cognitive Behavioral Therapy and Solution Focused Therapy to improve functioning, maintain stability, and avoid decompensation and the need for higher level of care.     Return in about 4 weeks, or earlier if symptoms worsen or fail to improve.    Recommended Referrals: none        This document has been electronically signed by Chas Ramirez LCSW  January 17, 2025 10:59 EST        Part of this note may be an electronic transcription/translation of spoken language to printed text using the Dragon Dictation System.    Mode of Visit: Video  Location of patient: -HOME-  Location of provider: +HOME+  You have chosen to receive care through a telehealth visit.  The patient has signed the video visit consent form.  The visit included audio and video interaction. No technical issues occurred during this visit.

## 2025-02-04 ENCOUNTER — OFFICE VISIT (OUTPATIENT)
Dept: INTERNAL MEDICINE | Facility: CLINIC | Age: 30
End: 2025-02-04
Payer: COMMERCIAL

## 2025-02-04 VITALS
HEIGHT: 64 IN | DIASTOLIC BLOOD PRESSURE: 74 MMHG | TEMPERATURE: 97.1 F | SYSTOLIC BLOOD PRESSURE: 102 MMHG | BODY MASS INDEX: 28.89 KG/M2 | HEART RATE: 92 BPM | WEIGHT: 169.2 LBS | RESPIRATION RATE: 14 BRPM

## 2025-02-04 DIAGNOSIS — G43.009 MIGRAINE WITHOUT AURA AND WITHOUT STATUS MIGRAINOSUS, NOT INTRACTABLE: Primary | ICD-10-CM

## 2025-02-04 DIAGNOSIS — L98.9 FACIAL LESION: ICD-10-CM

## 2025-02-04 DIAGNOSIS — R40.0 HAS DAYTIME DROWSINESS: ICD-10-CM

## 2025-02-04 PROCEDURE — 99214 OFFICE O/P EST MOD 30 MIN: CPT | Performed by: NURSE PRACTITIONER

## 2025-02-04 RX ORDER — ATOGEPANT 60 MG/1
60 TABLET ORAL DAILY
Qty: 14 TABLET | Refills: 0 | COMMUNITY
Start: 2025-02-04

## 2025-02-04 NOTE — PROGRESS NOTES
Patient Name: Elicia Arndt  : 1995   MRN: [id number]     Chief Complaint:    Chief Complaint   Patient presents with    Hypertension     Follow up       History of Present Illness: Elicia Arndt is a 29 y.o. female.  History of Present Illness  The patient is a 29-year-old female who presents via virtual visit for evaluation of migraines, daytime drowsiness, and a cyst.    Migraines  - Reports experiencing migraines approximately 4 to 5 times per month  - Migraines are effectively managed with Ubrelvy 100 mg  - Has not sought neurological consultation for this issue  - Migraines are predominantly localized at the back of her head    Daytime Drowsiness  - Underwent a sleep study in 2024 but has not received any follow-up communication regarding the results  - Continues to experience daytime drowsiness and often finds herself sleeping throughout the day  - Has not previously tried stimulant therapy  - High likelihood of falling asleep during sedentary activities such as reading, watching television, sitting in public places, being a passenger in a car, lying down in the afternoon, engaging in conversation, sitting quietly after meals, and during brief traffic stops  - Experiences restless leg syndrome, as observed by her , but has not sought treatment for this condition    Cyst  - Has a small spot on her skin that has been present for about a year  -  thinks it is getting bigger  - Does not itch or drain, but it hurts when her  tries to pop it  - Has not had it checked out    Supplemental information: She is doing okay on Wellbutrin. Her mood is stable. She is still taking trazodone at night. The dose was increased to 150 mg, but she took it on Thursday night and woke up with a massive migraine, dizziness, and blurry vision. Her behavioral health provider is managing that, and she has an appointment with her this week.    MEDICATIONS  Ubrelvy, Bystolic, Wellbutrin,  "trazodone (discontinued).         Subjective     Review of System: Review of Systems     Medications:     Current Outpatient Medications:     buPROPion XL (WELLBUTRIN XL) 150 MG 24 hr tablet, Take 3 tablets by mouth Every Morning., Disp: 90 tablet, Rfl: 0    Cariprazine HCl (VRAYLAR) 4.5 MG capsule capsule, Take 1 capsule by mouth Daily., Disp: 30 capsule, Rfl: 0    hydroCHLOROthiazide 25 MG tablet, TAKE 1 TABLET BY MOUTH DAILY, Disp: 90 tablet, Rfl: 1    losartan (COZAAR) 50 MG tablet, TAKE 1 TABLET BY MOUTH DAILY, Disp: 90 tablet, Rfl: 1    nebivolol (BYSTOLIC) 5 MG tablet, Take 1 tablet by mouth Daily., Disp: 30 tablet, Rfl: 3    ubrogepant (Ubrelvy) 100 MG tablet, Take one tablet with onset of headache; if symptoms persist or return, may repeat dose after >=2 hours., Disp: 9 tablet, Rfl: 0    Atogepant (Qulipta) 60 MG tablet, Take 1 tablet by mouth Daily., Disp: 14 tablet, Rfl: 0    Allergies:   Allergies   Allergen Reactions    Adhesive Tape Hives     Red and hives         Objective     Physical Exam:   Vital Signs:   Vitals:    02/04/25 0829   BP: 102/74   BP Location: Right arm   Patient Position: Sitting   Cuff Size: Adult   Pulse: 92   Resp: 14   Temp: 97.1 °F (36.2 °C)   TempSrc: Infrared   Weight: 76.7 kg (169 lb 3.2 oz)   Height: 162.6 cm (64\")   PainSc: 0-No pain           Physical Exam  Physical Exam  The patient appears well.  Lungs are clear.  Heart rate is regular.       Results  Testing  Sleep study showed reduced sleep efficiency, no significant sleep apnea, no oxygen desaturations, elevated PLOM index not associated with arousal, prolonged sleep latency, and no REM or delta sleep present.     Assessment / Plan      Assessment/Plan:   Diagnoses and all orders for this visit:    1. Migraine without aura and without status migrainosus, not intractable (Primary)  -     Atogepant (Qulipta) 60 MG tablet; Take 1 tablet by mouth Daily.  Dispense: 14 tablet; Refill: 0    2. Has daytime drowsiness    3. " Facial lesion       Assessment & Plan  1. Migraines  - Experiences 4 to 5 headache days per month  - Currently using Ubrelvy 100 mg, which is effective  - Provided sample of Qulipta 60 mg daily to see if it can help prevent migraines  - Continue using Ubrelvy as needed  - Will reach out next week to report tolerance or any difficulties  - Efforts will be made to get Qulipta approved if tolerated    2. Daytime drowsiness  - Bowman score is 18, indicating significant daytime sleepiness  - Sleep study showed no significant sleep apnea or oxygen desaturation but noted some leg movements  - Follow-up with sleep medicine specialist recommended  - Consider potential stimulant therapy for daytime drowsiness  -Consider treatment options for restless legs    3. Cyst  - Present for about a year and appears to be getting bigger  - Referral to dermatology for further evaluation and potential removal  - Advised not to attempt to pop it herself       Explained and discussed patient's condition and plan of care including labs and radiology that may be ordered.  Discussed when to follow-up.  Discussed possible red flags and how to follow-up with those.  Viewed patient's medications and discussed common side effects and symptoms to report. Patient to continue current medications as advised.  Be compliant with medications. Patient to call clinic if they have any worsening, no improvement, does not tolerate medication, or any future concerns about treatment.  Questions and concerns addressed at this appointment.  Patient to report to ER for emergencies.  Patient verbalized an understanding and agreement with plan of care.      Follow Up:   Return in about 4 weeks (around 3/4/2025) for Recheck.  Patient or patient representative verbalized consent for the use of Ambient Listening during the visit with  ECTOR Alvarado for chart documentation. 2/4/2025  12:33 EST    ECTOR Alvarado  [facility]

## 2025-02-06 ENCOUNTER — TELEMEDICINE (OUTPATIENT)
Dept: PSYCHIATRY | Facility: CLINIC | Age: 30
End: 2025-02-06
Payer: COMMERCIAL

## 2025-02-06 DIAGNOSIS — F39 UNSPECIFIED MOOD (AFFECTIVE) DISORDER: Primary | Chronic | ICD-10-CM

## 2025-02-06 DIAGNOSIS — F41.1 GENERALIZED ANXIETY DISORDER: Chronic | ICD-10-CM

## 2025-02-06 RX ORDER — SERTRALINE HYDROCHLORIDE 25 MG/1
25 TABLET, FILM COATED ORAL DAILY
Qty: 30 TABLET | Refills: 0 | Status: SHIPPED | OUTPATIENT
Start: 2025-02-06

## 2025-02-06 RX ORDER — BUPROPION HYDROCHLORIDE 150 MG/1
450 TABLET ORAL EVERY MORNING
Qty: 90 TABLET | Refills: 0 | Status: SHIPPED | OUTPATIENT
Start: 2025-02-06

## 2025-02-06 NOTE — PROGRESS NOTES
This provider is located at home office working remotely through the [facility] (through [facility]), [address], [facility], [address] using a secure [employer] Video Visit through [employer]. Patient is being seen remotely via telehealth at their home address in [place], and stated they are in a secure environment for this session. The patient's condition being diagnosed/treated is appropriate for telemedicine. The provider identified herself as well as her credentials. The patient, and/or patients guardian, consent to be seen remotely, and when consent is given they understand that the consent allows for patient identifiable information to be sent to a third party as needed. They may refuse to be seen remotely at any time. The electronic data is encrypted and password protected, and the patient and/or guardian has been advised of the potential risks to privacy not withstanding such measures.    You have chosen to receive care through a telehealth visit.  Do you consent to use a video/audio connection for your medical care today? Yes     Patient identifiers utilized: Name and date of birth.    Patient verbally confirmed consent for today's encounter  02/06/2025 .    The patient does verbally confirm they are being seen today while physically located in the [facility].  This provider/this APRN is licensed in the [facility] where the patient is located/being seen.           Subjective   Elicia Arndt is a 29 y.o. female who is here today for medication management follow up.    Chief Complaint:  Depression, anxiety, insomnia     History of Present Illness:  -Last visit with this APRN: 01/07/2025  -Since last encounter with this APRN/Office: The patient states that overall she has been doing okay, but states that she was unable to tolerate the increased dose of trazodone due to concern that it was causing exacerbation of migraines.  Reports that approximately 2  "weeks after taking the increased dose she woke up with a severe migraine that was causing dizziness and blurred vision, and endorses that medication was subsequently discontinued by PCP due to concern that the increased dose was contributing to this.  Reports that she has been off of the trazodone for approximately 1 week now.  Reports that overall she thinks she has been doing okay, but states that she feels depression and anxiety have been exacerbated recently.  Reports that she thinks anxiety has been exacerbated related to school and that she feels her anxiety symptoms are very situational at this time, but states that depression has been increased over the past month or so and denies any known cause or trigger for this.  -Mood reported as: \"okay\"  -Patient rates symptoms of depression at a 6-7/10 on a 0-10 scale, with 10 being the worst.   -Patient rates symptoms of anxiety at a 10/10 on a 0-10 scale, with 10 being the worst.    -Appetite reported as: \"good\"  -Sleep reported as: \"about the same\".  Reports she has not noticed any changes in sleep between taking the trazodone and since discontinuing it.  Reports she continues to fall asleep easily but struggles with nighttime awakenings.  Estimates that she is waking up around 5 or 6 times per night, but denies any known triggers for nighttime awakenings.  Reports that sometimes she can fall back asleep easily but other times she has difficulty.  Reports that for the past several years now she has found herself sleeping frequently throughout the day due to her difficulties with nighttime awakening, so discussed with the patient that this sleep pattern may actually be worsening sleeping difficulties at nighttime.  Reports she recently established with sleep medicine and completed a sleep study.  Reports that she is having to follow up with sleep medicine on 2/11 as she endorses that they have told her she has restless legs.  She states that she thinks this is " likely contributing to her difficulty with sleep.  Discussed with the patient that since sleeping difficulties are not originating from a psychiatric standpoint we will handoff treatment of sleeping issues to sleep medicine to address at this point.  The patient verbalizes understanding in her own words and endorses that she is agreeable to this.    -Changes in medications or new medical problems/concerns since last visit: Reports PCP recently discontinued her trazodone as she endorses that approximately 2 weeks after taking the increased dose she woke up with a severe migraine that was causing dizziness and blurred vision.  She states that she has frequent migraines at baseline, but states that his migraine was much more severe and she and her PCP were concerned that it may be related to the new dose of trazodone as this was the only change that she had experienced recently.  Reports that she has not been having any more issues with severe migraines since discontinuing the trazodone, and that migraines have just returned back to what she considers her baseline.  -Reported medication compliance: The patient reports compliance with their current psychotropic medication regimen, other than having discontinued the trazodone per instruction of PCP.  -Reported medication side effects or concerns: Denies any current medication side effects or concerns, but reports concern for increased dose of trazodone potentially contributing to exacerbation of migraines prior to discontinuing this medication.    -Auditory or visual hallucinations: Reports daily AVH, but states that this has been her baseline for over 15 years now.  Denies any recent deviation from baseline or any other symptoms of psychosis.  -Behaviors different from patient baseline, or any reckless, impulsive, or risky behaviors: Denies  -Symptoms of more or psychosis: Denies  -Self-injurious behavior: Denies  -SI/HI: The patient adamantly denies any suicidal or  homicidal ideations, plans, or intent at the time of this encounter and is convincing.  -No abnormal muscle movements or tics noted by this APRN during today's encounter, and the patient denies any of these symptoms.      -Using a shared decision-making approach the patient reports she would be interested in making medication adjustments/changes to target mood/depression and anxiety at this time.  Discussed with the patient that we can trial either adding to her current medication regimen or possibly replacing some of her current medications if she does not feel that they are effective for her anymore.  The patient states that she would be more interested in adding to her current medication regimen rather than replacing anything.  Discussed with the patient that we can draw adding Zoloft to current medication regimen at this time as she is not currently taking a serotonergic medication and an addition of an SSRI may be more beneficial for targeting her both her anxiety and mood, and may be helpful when used a combination with her current medications.  The patient verbalizes understanding in her own words and endorses that she is agreeable to this.    -The patient does verbally contract for safety at today's encounter and is in verbal agreement with the safety/crisis plan. The patient reports in their own words that they will reach out to this APRN/office prior to next scheduled appointment if there is any worsening of mood, any new psychiatric symptoms, any medication side effects or concerns, any concern for safety to self or others, any suicidal or homicidal ideations plans or intent, or any concerns, or they will call 911, call or text the suicide and crisis lifeline at [phone], or go to the closest emergency department.            Last Menstrual Period:  Reports she doesn't have regular menstrual cycles due to her Nexplanon.    The patient was educated that her prescribed medications can have potential risk to a  developing fetus. The patient is advised to contact this APRN/this office if she becomes pregnant or plans to become pregnant.  Pt verbalizes understanding and acknowledged agreement with this plan in her own words.       Prior Psychiatric Medications:  Effexor - reports ineffective      The following portions of the patient's history were reviewed and updated as appropriate: allergies, current medications, past family history, past medical history, past social history, past surgical history, and problem list.    Review of Systems   Constitutional:  Positive for fatigue. Negative for activity change, appetite change and unexpected weight change.   Psychiatric/Behavioral:  Positive for dysphoric mood, hallucinations (Reports daily AVH, but states that this has been her baseline for over 15 years now.  Denies any recent deviation from baseline or any other symptoms of psychosis.) and sleep disturbance. Negative for agitation, behavioral problems, confusion, decreased concentration, self-injury and suicidal ideas. The patient is nervous/anxious. The patient is not hyperactive.        Objective   Physical Exam  Constitutional:       Appearance: Normal appearance.   Neurological:      Mental Status: She is alert.   Psychiatric:         Attention and Perception: Attention normal. She perceives auditory (Reports daily AVH, but states that this has been her baseline for over 15 years now.  Denies any recent deviation from baseline or any other symptoms of psychosis.) and visual (Reports daily AVH, but states that this has been her baseline for over 15 years now.  Denies any recent deviation from baseline or any other symptoms of psychosis.) hallucinations.         Mood and Affect: Affect normal. Mood is anxious and depressed.         Speech: Speech normal.         Behavior: Behavior normal. Behavior is cooperative.         Thought Content: Thought content normal. Thought content is not paranoid or delusional. Thought  content does not include homicidal or suicidal ideation. Thought content does not include homicidal or suicidal plan.         Cognition and Memory: Cognition and memory normal.         Judgment: Judgment normal.       not currently breastfeeding.    The patient was seen remotely today via a MyChart Video Visit through [facility].  Unable to obtain vital signs due to nature of remote visit.  Height stated at 64 inches.  Weight stated at 169 pounds.     Due to the nature of virtual visits and inability to monitor vital signs and weight with virtual visits, the patient has been encouraged to monitor their vital signs and weight regularly either through self-monitoring via home device(s) or with their Primary Care Provider, and the patient has been instructed to notify this APRN of any abnormalities or significant changes from baseline.       Allergies   Allergen Reactions    Adhesive Tape Hives     Red and hives         No results found for this or any previous visit (from the past 12 weeks).    Current Medications:   Current Outpatient Medications   Medication Sig Dispense Refill    Atogepant (Qulipta) 60 MG tablet Take 1 tablet by mouth Daily. 14 tablet 0    buPROPion XL (WELLBUTRIN XL) 150 MG 24 hr tablet Take 3 tablets by mouth Every Morning. 90 tablet 0    Cariprazine HCl (VRAYLAR) 4.5 MG capsule capsule Take 1 capsule by mouth Daily. 30 capsule 0    hydroCHLOROthiazide 25 MG tablet TAKE 1 TABLET BY MOUTH DAILY 90 tablet 1    losartan (COZAAR) 50 MG tablet TAKE 1 TABLET BY MOUTH DAILY 90 tablet 1    nebivolol (BYSTOLIC) 5 MG tablet Take 1 tablet by mouth Daily. 30 tablet 3    ubrogepant (Ubrelvy) 100 MG tablet Take one tablet with onset of headache; if symptoms persist or return, may repeat dose after >=2 hours. 9 tablet 0    sertraline (ZOLOFT) 25 MG tablet Take 1 tablet by mouth Daily. 30 tablet 0     No current facility-administered medications for this visit.       Mental Status Exam:   Hygiene:   good  Cooperation:   Cooperative  Eye Contact:  Good  Psychomotor Behavior:  Appropriate  Affect:  Full range  Hopelessness: Denies  Speech:  Normal  Thought Process:  Goal directed and Linear  Thought Content:  Normal  Suicidal:  None  Homicidal:  None  Hallucinations:   Reports daily AVH, but states that this has been her baseline for over 15 years now.  Denies any recent deviation from baseline or any other symptoms of psychosis.  Delusion:  None  Memory:  Intact  Orientation:  Person, Place, Time, and Situation  Reliability:  good  Insight:  Good  Judgement:  Good  Impulse Control:  Good  Physical/Medical Issues:  Yes see medical history        PHQ-9 Depression Screening  Little interest or pleasure in doing things? (Patient-Rptd) Several days   Feeling down, depressed, or hopeless? (Patient-Rptd) Several days   PHQ-2 Total Score (Patient-Rptd) 2   Trouble falling or staying asleep, or sleeping too much? (Patient-Rptd) Several days   Feeling tired or having little energy? (Patient-Rptd) Several days   Poor appetite or overeating? (Patient-Rptd) Several days   Feeling bad about yourself - or that you are a failure or have let yourself or your family down? (Patient-Rptd) Several days   Trouble concentrating on things, such as reading the newspaper or watching television? (Patient-Rptd) Several days   Moving or speaking so slowly that other people could have noticed? Or the opposite - being so fidgety or restless that you have been moving around a lot more than usual? (Patient-Rptd) Several days   Thoughts that you would be better off dead, or of hurting yourself in some way? (Patient-Rptd) Not at all   PHQ-9 Total Score (Patient-Rptd) 8   If you checked off any problems, how difficult have these problems made it for you to do your work, take care of things at home, or get along with other people? (Patient-Rptd) Very difficult           DREW-7  Feeling nervous, anxious or on edge: (Patient-Rptd) Several days  Not being able to stop or  control worrying: (Patient-Rptd) Several days  Worrying too much about different things: (Patient-Rptd) Several days  Trouble Relaxing: (Patient-Rptd) Several days  Being so restless that it is hard to sit still: (Patient-Rptd) Several days  Feeling afraid as if something awful might happen: (Patient-Rptd) Not at all  Becoming easily annoyed or irritable: (Patient-Rptd) Several days  DREW 7 Total Score: (Patient-Rptd) 6  If you checked any problems, how difficult have these problems made it for you to do your work, take care of things at home, or get along with other people: (Patient-Rptd) Somewhat difficult          Assessment & Plan   Diagnoses and all orders for this visit:    1. Unspecified mood (affective) disorder (Primary)  -     Cariprazine HCl (VRAYLAR) 4.5 MG capsule capsule; Take 1 capsule by mouth Daily.  Dispense: 30 capsule; Refill: 0  -     buPROPion XL (WELLBUTRIN XL) 150 MG 24 hr tablet; Take 3 tablets by mouth Every Morning.  Dispense: 90 tablet; Refill: 0  -     sertraline (ZOLOFT) 25 MG tablet; Take 1 tablet by mouth Daily.  Dispense: 30 tablet; Refill: 0    2. Generalized anxiety disorder  -     buPROPion XL (WELLBUTRIN XL) 150 MG 24 hr tablet; Take 3 tablets by mouth Every Morning.  Dispense: 90 tablet; Refill: 0  -     sertraline (ZOLOFT) 25 MG tablet; Take 1 tablet by mouth Daily.  Dispense: 30 tablet; Refill: 0              Visit Diagnoses:    ICD-10-CM ICD-9-CM   1. Unspecified mood (affective) disorder  F39 296.90   2. Generalized anxiety disorder  F41.1 300.02         GOALS:  Short Term Goals: Patient will be compliant with medication, and patient will have no significant medication related side effects.  Patient will be engaged in psychotherapy as indicated.  Patient will report subjective improvement of symptoms.  Long term goals: To stabilize mood and treat/improve subjective symptoms, the patient will stay out of the hospital, the patient will be at an optimal level of functioning, and  the patient will take all medications as prescribed.  The patient verbalized understanding and agreement with goals that were mutually set.      SUICIDE RISK ASSESSMENT: Unalterable demographics and a history of mental health intervention indicate this patient is in a high risk category compared to the general population. At present, the patient denies active SI/HI, intentions, or plans at this time and agrees to seek immediate care should such thoughts develop. The patient verbalizes understanding of how to access emergency care if needed and agrees to do so. Consideration of suicide risk and protective factors such as history, current presentation, individual strengths and weaknesses, psychosocial and environmental stressors and variables, psychiatric illness and symptoms, medical conditions and pain, took place in this interview. Based on those considerations, the patient is determined: within individual baseline and presenting no imminent risk for suicide or homicide. Other recommendations: The patient does not meet the criteria for inpatient admission and is not a safety risk to self or others at today's visit. Inpatient treatment offers no significant advantages over outpatient treatment for this patient at today's visit.      SAFETY PLAN:  Patient was given ample time for questions and fully participated in treatment planning.  Patient was encouraged to call the clinic with any questions or concerns.  Patient was informed of access to emergency care. If patient were to develop any significant symptomatology, suicidal ideation, homicidal ideation, any concerns, or feel unsafe at any time they are to call the clinic and if unable to get immediate assistance should immediately call 911 or go to the nearest emergency room.  The patient is advised to remove or secure (lock away) all lethal weapons (including guns) and sharps (including razors, scissors, knives, etc.).  All medications (including any prescribed and  any over the counter medications) should be stored in a safe and secured location that is not obtainable by children/adolescents.  Patient was given an opportunity and encouraged to ask questions about their medication, illness, and treatment. Patient contracted verbally for the following: If you are experiencing an emotional crisis or have thoughts of harming yourself or others, please go to your nearest local emergency room or call 911. Will continue to re-assess medication response and side effects frequently to establish efficacy and ensure safety. Risks, any black box warnings, side effects, off label usage, and benefits of medication and treatment discussed with patient, along with potential adverse side effects of current and/or newly prescribed medication, alternative treatment options, and OTC medications.  Patient verbalized understanding of potential risks, any off label use of medication, any black box warnings, and any side effects in their own words. The patient verbalized understanding and agreed to comply with the safety plan discussed in their own words.  Patient given the number to the office. Number also available to the 24- hour suicide hotline.       TREATMENT PLAN/GOALS: Continue medications and treatment plan as indicated. Treatment and medication options discussed during today's visit. Patient acknowledged and verbally consented to continue with current treatment plan and was educated on the importance of compliance with treatment and follow-up appointments.        -Begin Zoloft 25 mg daily for mood/anxiety  -Continue Wellbutrin  mg daily for mood/anxiety  -Continue Vraylar 4.5 mg daily as adjunct for mood/anxiety  -Continue psychotherapy with Chas Ramirez LCSW  -Rule out psychotic disorder potentially contributing to reported hallucinations        MEDICATION ISSUES: Discussed medication options and treatment plan of prescribed medication, any off label use of medication, as well as  the risks, benefits, any black box warnings including increased suicidality, and side effects including but not limited to potential falls, dizziness, possible impaired driving, GI side effects (change in appetite, abdominal discomfort, nausea, vomiting, diarrhea, and/or constipation), dry mouth, somnolence, sedation, insomnia, activation, agitation, irritation, tremors, abnormal muscle movements or disorders, tardive dyskinesia, akathisia, asthenia, headache, sweating, possible bruising or rare bleeding, electrolyte and/or fluid abnormalities, change in blood pressure/heart rate/and or heart rhythm, hypotension, sexual dysfunction, rare impulse control problems, rare seizures, rare neuroleptic malignant syndrome, increased risk of death and cerebrovascular events, change in blood glucose and increased risk for diabetes, change in triglycerides and cholesterol and increased risk for dyslipidemia,  weight gain, weight gain that can become problematic to health, skin conditions and reactions, and metabolic adversities among others. Patient and/or guardian are agreeable to call the office with any worsening of symptoms or onset of side effects, or if any concerns or questions arise.  The contact information for the office is made available to the patient and/or guardian. Patient and/or guardian are agreeable to call 911 or go to the nearest ER should they begin having any SI/HI, or if any urgent concerns arise. No medication side effects or related complaints today.    This APRN has discussed with the patient/guardian about the possibility of serotonin syndrome when certain medications are taken together, as is the case with this patient.  This APRN has provided the patient/guardian with a list of symptoms of serotonin syndrome including symptoms of autonomic instability, altered sensorium, confusion, restlessness, agitation, myoclonus, hyperreflexia, hyperthermia, diaphoresis, tremor, chills, diarrhea and cramps,  ataxia, headache, migraines, seizures, and insomnia; which could lead to permanent hyperthermic brain damage, cardiovascular collapse, coma, or even death.  The patient/guardian are instructed to stop medications immediately and either contact this APRN/this office during regular office hours, or go to the emergency department/call 911, if they begin to experience any of the symptoms discussed.  The benefits and risks of the current medication regimen are discussed with the patient/guardian, and they feel that the benefits out weigh the risks.  The patient/guardian verbalized understanding and agreement in their own words.              VERBAL INFORMED CONSENT FOR MEDICATION:  The patient was educated that their proposed/prescribed psychotropic medication(s) has potential risks, side effects, adverse effects, and black box warnings; and these have been discussed with the patient.  The patient has been informed that their treatment and medication dosage is to be individualized, and may even be above or below the recommended range/dosage due to patient individualization and response, but medication is prescribed using a shared decision making approach, and no medication or dosage will be prescribed without the patient's verbal consent.  The reason for the use of the medication including any off label use and alternative modes of treatment other than or in addition to medication has been considered and discussed, the probable consequences of not receiving the proposed treatment have been discussed, and any treatment side effects, black box warnings, and cautions associated with treatment have been discussed with the patient.  The patient is allowed ample time to openly discuss and ask questions regarding the proposed medication(s) and treatment plan and the patient verbalizes understanding the reasons for the use of the medication, its potential risks and benefits, other alternative treatment(s), and the probable  consequences that may occur if the proposed medication is not given.  The patient has been given ample time to ask questions and study the information and find the information to be specific, accurate, and complete.  The patient gives verbal consent for the medication(s) proposed/prescribed, they verbalized understanding that they can refuse and withdraw consent at any time with the assistance of this APRN, and the patient has verbally confirmed that they are aware, and are willing, to take the prescribed medication and follow the treatment plan with the known possible risks, side effect, black box warnings, and any potential medication interactions, and the patient reports they will be worse off without this medication and treatment plan.  The patient is advised to contact this APRN/this office if any questions or concerns arise at any time (at [phone]), or call 911/go to the closest emergency department if needed or outside of office hours.       [facility] No Show Policy:  We understand unexpected circumstances arise; however, anytime you miss your appointment we are unable to provide you appropriate care.  In addition, each appointment missed could have been used to provide care for others.  We ask that you call at least 24 hours in advance to cancel or reschedule an appointment.  We would like to take this opportunity to remind you of our policy stating patients who miss THREE or more appointments without cancelling or rescheduling 24 hours in advance of the appointment may be subject to cancellation of any further visits with our clinic and recommendation to seek in-person services/visits.    Please call [phone] to reschedule your appointment. If there are reasons that make it difficult for you to keep the appointments, please call and let us know how we can help.  Please understand that medication prescribing will not continue without seeing your provider.      [facility]  [facility] (formerly [facility]s No Show Policy reviewed with patient at today's visit. Patient verbalized understanding of this policy. Discussed with patient that in the event that there are three or more no show visits, it will be recommended that they pursue in-person services/visits as noncompliance with telehealth visits indicates that patient is not an appropriate candidate for telemedicine and would likely be more appropriate for in-person services/visits. Patient verbalizes understanding and is agreeable to this.           MEDS ORDERED DURING VISIT:  New Medications Ordered This Visit   Medications    Cariprazine HCl (VRAYLAR) 4.5 MG capsule capsule     Sig: Take 1 capsule by mouth Daily.     Dispense:  30 capsule     Refill:  0    buPROPion XL (WELLBUTRIN XL) 150 MG 24 hr tablet     Sig: Take 3 tablets by mouth Every Morning.     Dispense:  90 tablet     Refill:  0    sertraline (ZOLOFT) 25 MG tablet     Sig: Take 1 tablet by mouth Daily.     Dispense:  30 tablet     Refill:  0       Return in about 4 weeks (around 3/6/2025), or if symptoms worsen or fail to improve, for Recheck.       Patient will follow-up in 4 weeks, highly encouraged the patient if she had any questions or concerns to contact the behavioral health virtual clinic for sooner appointment patient verbalized understanding.      Functional Status: Moderate impairment     Prognosis: Good with Ongoing Treatment             This document has been electronically signed by ECTOR Gerardo  February 6, 2025 11:32 EST      Some of the data in this electronic note has been brought forward from a previous encounter, any necessary changes have been made, it has been reviewed by this APRN, and it is accurate.       Part of this note may be an electronic transcription/translation of spoken language to printed text using the Dragon Dictation System.

## 2025-02-06 NOTE — LETTER
February 6, 2025     ECTOR Escobar  [address]  [address]  [facility] [address]    Patient: Elicia Arndt   YOB: 1995   Date of Visit: 2/6/2025     Dear ECTOR Escobar:       Below are the relevant portions of my assessment and plan of care.    If you have questions, please do not hesitate to call me. I look forward to following Elicia along with you.         Sincerely,        ECTOR Gerardo        CC: No Recipients    Re Herrmann APRN  02/06/25 1132  Sign when Signing Visit  This provider is located at home office working remotely through the [facility] (through [facility] [address] [facility], [address] using a secure [employer] Video Visit through [employer]. Patient is being seen remotely via telehealth at their home address in [place], and stated they are in a secure environment for this session. The patient's condition being diagnosed/treated is appropriate for telemedicine. The provider identified herself as well as her credentials. The patient, and/or patients guardian, consent to be seen remotely, and when consent is given they understand that the consent allows for patient identifiable information to be sent to a third party as needed. They may refuse to be seen remotely at any time. The electronic data is encrypted and password protected, and the patient and/or guardian has been advised of the potential risks to privacy not withstanding such measures.    You have chosen to receive care through a telehealth visit.  Do you consent to use a video/audio connection for your medical care today? Yes     Patient identifiers utilized: Name and date of birth.    Patient verbally confirmed consent for today's encounter  02/06/2025 .    The patient does verbally confirm they are being seen today while physically located in the [facility].  This provider/this APRN is licensed in the [facility] where the patient  "is located/being seen.           Subjective  Elicia Arndt is a 29 y.o. female who is here today for medication management follow up.    Chief Complaint:  Depression, anxiety, insomnia     History of Present Illness:  -Last visit with this APRN: 01/07/2025  -Since last encounter with this APRN/Office: The patient states that overall she has been doing okay, but states that she was unable to tolerate the increased dose of trazodone due to concern that it was causing exacerbation of migraines.  Reports that approximately 2 weeks after taking the increased dose she woke up with a severe migraine that was causing dizziness and blurred vision, and endorses that medication was subsequently discontinued by PCP due to concern that the increased dose was contributing to this.  Reports that she has been off of the trazodone for approximately 1 week now.  Reports that overall she thinks she has been doing okay, but states that she feels depression and anxiety have been exacerbated recently.  Reports that she thinks anxiety has been exacerbated related to school and that she feels her anxiety symptoms are very situational at this time, but states that depression has been increased over the past month or so and denies any known cause or trigger for this.  -Mood reported as: \"okay\"  -Patient rates symptoms of depression at a 6-7/10 on a 0-10 scale, with 10 being the worst.   -Patient rates symptoms of anxiety at a 10/10 on a 0-10 scale, with 10 being the worst.    -Appetite reported as: \"good\"  -Sleep reported as: \"about the same\".  Reports she has not noticed any changes in sleep between taking the trazodone and since discontinuing it.  Reports she continues to fall asleep easily but struggles with nighttime awakenings.  Estimates that she is waking up around 5 or 6 times per night, but denies any known triggers for nighttime awakenings.  Reports that sometimes she can fall back asleep easily but other times she has " difficulty.  Reports that for the past several years now she has found herself sleeping frequently throughout the day due to her difficulties with nighttime awakening, so discussed with the patient that this sleep pattern may actually be worsening sleeping difficulties at nighttime.  Reports she recently established with sleep medicine and completed a sleep study.  Reports that she is having to follow up with sleep medicine on 2/11 as she endorses that they have told her she has restless legs.  She states that she thinks this is likely contributing to her difficulty with sleep.  Discussed with the patient that since sleeping difficulties are not originating from a psychiatric standpoint we will handoff treatment of sleeping issues to sleep medicine to address at this point.  The patient verbalizes understanding in her own words and endorses that she is agreeable to this.    -Changes in medications or new medical problems/concerns since last visit: Reports PCP recently discontinued her trazodone as she endorses that approximately 2 weeks after taking the increased dose she woke up with a severe migraine that was causing dizziness and blurred vision.  She states that she has frequent migraines at baseline, but states that his migraine was much more severe and she and her PCP were concerned that it may be related to the new dose of trazodone as this was the only change that she had experienced recently.  Reports that she has not been having any more issues with severe migraines since discontinuing the trazodone, and that migraines have just returned back to what she considers her baseline.  -Reported medication compliance: The patient reports compliance with their current psychotropic medication regimen, other than having discontinued the trazodone per instruction of PCP.  -Reported medication side effects or concerns: Denies any current medication side effects or concerns, but reports concern for increased dose of  trazodone potentially contributing to exacerbation of migraines prior to discontinuing this medication.    -Auditory or visual hallucinations: Reports daily AVH, but states that this has been her baseline for over 15 years now.  Denies any recent deviation from baseline or any other symptoms of psychosis.  -Behaviors different from patient baseline, or any reckless, impulsive, or risky behaviors: Denies  -Symptoms of more or psychosis: Denies  -Self-injurious behavior: Denies  -SI/HI: The patient adamantly denies any suicidal or homicidal ideations, plans, or intent at the time of this encounter and is convincing.  -No abnormal muscle movements or tics noted by this APRN during today's encounter, and the patient denies any of these symptoms.      -Using a shared decision-making approach the patient reports she would be interested in making medication adjustments/changes to target mood/depression and anxiety at this time.  Discussed with the patient that we can trial either adding to her current medication regimen or possibly replacing some of her current medications if she does not feel that they are effective for her anymore.  The patient states that she would be more interested in adding to her current medication regimen rather than replacing anything.  Discussed with the patient that we can draw adding Zoloft to current medication regimen at this time as she is not currently taking a serotonergic medication and an addition of an SSRI may be more beneficial for targeting her both her anxiety and mood, and may be helpful when used a combination with her current medications.  The patient verbalizes understanding in her own words and endorses that she is agreeable to this.    -The patient does verbally contract for safety at today's encounter and is in verbal agreement with the safety/crisis plan. The patient reports in their own words that they will reach out to this APRN/office prior to next scheduled appointment  if there is any worsening of mood, any new psychiatric symptoms, any medication side effects or concerns, any concern for safety to self or others, any suicidal or homicidal ideations plans or intent, or any concerns, or they will call 911, call or text the suicide and crisis lifeline at [phone], or go to the closest emergency department.            Last Menstrual Period:  Reports she doesn't have regular menstrual cycles due to her Nexplanon.    The patient was educated that her prescribed medications can have potential risk to a developing fetus. The patient is advised to contact this APRN/this office if she becomes pregnant or plans to become pregnant.  Pt verbalizes understanding and acknowledged agreement with this plan in her own words.       Prior Psychiatric Medications:  Effexor - reports ineffective      The following portions of the patient's history were reviewed and updated as appropriate: allergies, current medications, past family history, past medical history, past social history, past surgical history, and problem list.    Review of Systems   Constitutional:  Positive for fatigue. Negative for activity change, appetite change and unexpected weight change.   Psychiatric/Behavioral:  Positive for dysphoric mood, hallucinations (Reports daily AVH, but states that this has been her baseline for over 15 years now.  Denies any recent deviation from baseline or any other symptoms of psychosis.) and sleep disturbance. Negative for agitation, behavioral problems, confusion, decreased concentration, self-injury and suicidal ideas. The patient is nervous/anxious. The patient is not hyperactive.        Objective  Physical Exam  Constitutional:       Appearance: Normal appearance.   Neurological:      Mental Status: She is alert.   Psychiatric:         Attention and Perception: Attention normal. She perceives auditory (Reports daily AVH, but states that this has been her baseline for over 15 years now.  Denies any  recent deviation from baseline or any other symptoms of psychosis.) and visual (Reports daily AVH, but states that this has been her baseline for over 15 years now.  Denies any recent deviation from baseline or any other symptoms of psychosis.) hallucinations.         Mood and Affect: Affect normal. Mood is anxious and depressed.         Speech: Speech normal.         Behavior: Behavior normal. Behavior is cooperative.         Thought Content: Thought content normal. Thought content is not paranoid or delusional. Thought content does not include homicidal or suicidal ideation. Thought content does not include homicidal or suicidal plan.         Cognition and Memory: Cognition and memory normal.         Judgment: Judgment normal.       not currently breastfeeding.    The patient was seen remotely today via a MyChart Video Visit through [employer].  Unable to obtain vital signs due to nature of remote visit.  Height stated at 64 inches.  Weight stated at 169 pounds.     Due to the nature of virtual visits and inability to monitor vital signs and weight with virtual visits, the patient has been encouraged to monitor their vital signs and weight regularly either through self-monitoring via home device(s) or with their Primary Care Provider, and the patient has been instructed to notify this APRN of any abnormalities or significant changes from baseline.       Allergies   Allergen Reactions   • Adhesive Tape Hives     Red and hives         No results found for this or any previous visit (from the past 12 weeks).    Current Medications:   Current Outpatient Medications   Medication Sig Dispense Refill   • Atogepant (Qulipta) 60 MG tablet Take 1 tablet by mouth Daily. 14 tablet 0   • buPROPion XL (WELLBUTRIN XL) 150 MG 24 hr tablet Take 3 tablets by mouth Every Morning. 90 tablet 0   • Cariprazine HCl (VRAYLAR) 4.5 MG capsule capsule Take 1 capsule by mouth Daily. 30 capsule 0   • hydroCHLOROthiazide 25 MG tablet TAKE 1 TABLET  BY MOUTH DAILY 90 tablet 1   • losartan (COZAAR) 50 MG tablet TAKE 1 TABLET BY MOUTH DAILY 90 tablet 1   • nebivolol (BYSTOLIC) 5 MG tablet Take 1 tablet by mouth Daily. 30 tablet 3   • ubrogepant (Ubrelvy) 100 MG tablet Take one tablet with onset of headache; if symptoms persist or return, may repeat dose after >=2 hours. 9 tablet 0   • sertraline (ZOLOFT) 25 MG tablet Take 1 tablet by mouth Daily. 30 tablet 0     No current facility-administered medications for this visit.       Mental Status Exam:   Hygiene:   good  Cooperation:  Cooperative  Eye Contact:  Good  Psychomotor Behavior:  Appropriate  Affect:  Full range  Hopelessness: Denies  Speech:  Normal  Thought Process:  Goal directed and Linear  Thought Content:  Normal  Suicidal:  None  Homicidal:  None  Hallucinations:   Reports daily AVH, but states that this has been her baseline for over 15 years now.  Denies any recent deviation from baseline or any other symptoms of psychosis.  Delusion:  None  Memory:  Intact  Orientation:  Person, Place, Time, and Situation  Reliability:  good  Insight:  Good  Judgement:  Good  Impulse Control:  Good  Physical/Medical Issues:  Yes see medical history        PHQ-9 Depression Screening  Little interest or pleasure in doing things? (Patient-Rptd) Several days   Feeling down, depressed, or hopeless? (Patient-Rptd) Several days   PHQ-2 Total Score (Patient-Rptd) 2   Trouble falling or staying asleep, or sleeping too much? (Patient-Rptd) Several days   Feeling tired or having little energy? (Patient-Rptd) Several days   Poor appetite or overeating? (Patient-Rptd) Several days   Feeling bad about yourself - or that you are a failure or have let yourself or your family down? (Patient-Rptd) Several days   Trouble concentrating on things, such as reading the newspaper or watching television? (Patient-Rptd) Several days   Moving or speaking so slowly that other people could have noticed? Or the opposite - being so fidgety or  restless that you have been moving around a lot more than usual? (Patient-Rptd) Several days   Thoughts that you would be better off dead, or of hurting yourself in some way? (Patient-Rptd) Not at all   PHQ-9 Total Score (Patient-Rptd) 8   If you checked off any problems, how difficult have these problems made it for you to do your work, take care of things at home, or get along with other people? (Patient-Rptd) Very difficult           DREW-7  Feeling nervous, anxious or on edge: (Patient-Rptd) Several days  Not being able to stop or control worrying: (Patient-Rptd) Several days  Worrying too much about different things: (Patient-Rptd) Several days  Trouble Relaxing: (Patient-Rptd) Several days  Being so restless that it is hard to sit still: (Patient-Rptd) Several days  Feeling afraid as if something awful might happen: (Patient-Rptd) Not at all  Becoming easily annoyed or irritable: (Patient-Rptd) Several days  DREW 7 Total Score: (Patient-Rptd) 6  If you checked any problems, how difficult have these problems made it for you to do your work, take care of things at home, or get along with other people: (Patient-Rptd) Somewhat difficult          Assessment & Plan  Diagnoses and all orders for this visit:    1. Unspecified mood (affective) disorder (Primary)  -     Cariprazine HCl (VRAYLAR) 4.5 MG capsule capsule; Take 1 capsule by mouth Daily.  Dispense: 30 capsule; Refill: 0  -     buPROPion XL (WELLBUTRIN XL) 150 MG 24 hr tablet; Take 3 tablets by mouth Every Morning.  Dispense: 90 tablet; Refill: 0  -     sertraline (ZOLOFT) 25 MG tablet; Take 1 tablet by mouth Daily.  Dispense: 30 tablet; Refill: 0    2. Generalized anxiety disorder  -     buPROPion XL (WELLBUTRIN XL) 150 MG 24 hr tablet; Take 3 tablets by mouth Every Morning.  Dispense: 90 tablet; Refill: 0  -     sertraline (ZOLOFT) 25 MG tablet; Take 1 tablet by mouth Daily.  Dispense: 30 tablet; Refill: 0              Visit Diagnoses:    ICD-10-CM ICD-9-CM    1. Unspecified mood (affective) disorder  F39 296.90   2. Generalized anxiety disorder  F41.1 300.02         GOALS:  Short Term Goals: Patient will be compliant with medication, and patient will have no significant medication related side effects.  Patient will be engaged in psychotherapy as indicated.  Patient will report subjective improvement of symptoms.  Long term goals: To stabilize mood and treat/improve subjective symptoms, the patient will stay out of the hospital, the patient will be at an optimal level of functioning, and the patient will take all medications as prescribed.  The patient verbalized understanding and agreement with goals that were mutually set.      SUICIDE RISK ASSESSMENT: Unalterable demographics and a history of mental health intervention indicate this patient is in a high risk category compared to the general population. At present, the patient denies active SI/HI, intentions, or plans at this time and agrees to seek immediate care should such thoughts develop. The patient verbalizes understanding of how to access emergency care if needed and agrees to do so. Consideration of suicide risk and protective factors such as history, current presentation, individual strengths and weaknesses, psychosocial and environmental stressors and variables, psychiatric illness and symptoms, medical conditions and pain, took place in this interview. Based on those considerations, the patient is determined: within individual baseline and presenting no imminent risk for suicide or homicide. Other recommendations: The patient does not meet the criteria for inpatient admission and is not a safety risk to self or others at today's visit. Inpatient treatment offers no significant advantages over outpatient treatment for this patient at today's visit.      SAFETY PLAN:  Patient was given ample time for questions and fully participated in treatment planning.  Patient was encouraged to call the clinic with any  questions or concerns.  Patient was informed of access to emergency care. If patient were to develop any significant symptomatology, suicidal ideation, homicidal ideation, any concerns, or feel unsafe at any time they are to call the clinic and if unable to get immediate assistance should immediately call 911 or go to the nearest emergency room.  The patient is advised to remove or secure (lock away) all lethal weapons (including guns) and sharps (including razors, scissors, knives, etc.).  All medications (including any prescribed and any over the counter medications) should be stored in a safe and secured location that is not obtainable by children/adolescents.  Patient was given an opportunity and encouraged to ask questions about their medication, illness, and treatment. Patient contracted verbally for the following: If you are experiencing an emotional crisis or have thoughts of harming yourself or others, please go to your nearest local emergency room or call 911. Will continue to re-assess medication response and side effects frequently to establish efficacy and ensure safety. Risks, any black box warnings, side effects, off label usage, and benefits of medication and treatment discussed with patient, along with potential adverse side effects of current and/or newly prescribed medication, alternative treatment options, and OTC medications.  Patient verbalized understanding of potential risks, any off label use of medication, any black box warnings, and any side effects in their own words. The patient verbalized understanding and agreed to comply with the safety plan discussed in their own words.  Patient given the number to the office. Number also available to the 24- hour suicide hotline.       TREATMENT PLAN/GOALS: Continue medications and treatment plan as indicated. Treatment and medication options discussed during today's visit. Patient acknowledged and verbally consented to continue with current  treatment plan and was educated on the importance of compliance with treatment and follow-up appointments.        -Begin Zoloft 25 mg daily for mood/anxiety  -Continue Wellbutrin  mg daily for mood/anxiety  -Continue Vraylar 4.5 mg daily as adjunct for mood/anxiety  -Continue psychotherapy with Chas Ramirez LCSW  -Rule out psychotic disorder potentially contributing to reported hallucinations        MEDICATION ISSUES: Discussed medication options and treatment plan of prescribed medication, any off label use of medication, as well as the risks, benefits, any black box warnings including increased suicidality, and side effects including but not limited to potential falls, dizziness, possible impaired driving, GI side effects (change in appetite, abdominal discomfort, nausea, vomiting, diarrhea, and/or constipation), dry mouth, somnolence, sedation, insomnia, activation, agitation, irritation, tremors, abnormal muscle movements or disorders, tardive dyskinesia, akathisia, asthenia, headache, sweating, possible bruising or rare bleeding, electrolyte and/or fluid abnormalities, change in blood pressure/heart rate/and or heart rhythm, hypotension, sexual dysfunction, rare impulse control problems, rare seizures, rare neuroleptic malignant syndrome, increased risk of death and cerebrovascular events, change in blood glucose and increased risk for diabetes, change in triglycerides and cholesterol and increased risk for dyslipidemia,  weight gain, weight gain that can become problematic to health, skin conditions and reactions, and metabolic adversities among others. Patient and/or guardian are agreeable to call the office with any worsening of symptoms or onset of side effects, or if any concerns or questions arise.  The contact information for the office is made available to the patient and/or guardian. Patient and/or guardian are agreeable to call 911 or go to the nearest ER should they begin having any SI/HI,  or if any urgent concerns arise. No medication side effects or related complaints today.    This APRN has discussed with the patient/guardian about the possibility of serotonin syndrome when certain medications are taken together, as is the case with this patient.  This APRN has provided the patient/guardian with a list of symptoms of serotonin syndrome including symptoms of autonomic instability, altered sensorium, confusion, restlessness, agitation, myoclonus, hyperreflexia, hyperthermia, diaphoresis, tremor, chills, diarrhea and cramps, ataxia, headache, migraines, seizures, and insomnia; which could lead to permanent hyperthermic brain damage, cardiovascular collapse, coma, or even death.  The patient/guardian are instructed to stop medications immediately and either contact this APRN/this office during regular office hours, or go to the emergency department/call 911, if they begin to experience any of the symptoms discussed.  The benefits and risks of the current medication regimen are discussed with the patient/guardian, and they feel that the benefits out weigh the risks.  The patient/guardian verbalized understanding and agreement in their own words.              VERBAL INFORMED CONSENT FOR MEDICATION:  The patient was educated that their proposed/prescribed psychotropic medication(s) has potential risks, side effects, adverse effects, and black box warnings; and these have been discussed with the patient.  The patient has been informed that their treatment and medication dosage is to be individualized, and may even be above or below the recommended range/dosage due to patient individualization and response, but medication is prescribed using a shared decision making approach, and no medication or dosage will be prescribed without the patient's verbal consent.  The reason for the use of the medication including any off label use and alternative modes of treatment other than or in addition to medication has  been considered and discussed, the probable consequences of not receiving the proposed treatment have been discussed, and any treatment side effects, black box warnings, and cautions associated with treatment have been discussed with the patient.  The patient is allowed ample time to openly discuss and ask questions regarding the proposed medication(s) and treatment plan and the patient verbalizes understanding the reasons for the use of the medication, its potential risks and benefits, other alternative treatment(s), and the probable consequences that may occur if the proposed medication is not given.  The patient has been given ample time to ask questions and study the information and find the information to be specific, accurate, and complete.  The patient gives verbal consent for the medication(s) proposed/prescribed, they verbalized understanding that they can refuse and withdraw consent at any time with the assistance of this APRN, and the patient has verbally confirmed that they are aware, and are willing, to take the prescribed medication and follow the treatment plan with the known possible risks, side effect, black box warnings, and any potential medication interactions, and the patient reports they will be worse off without this medication and treatment plan.  The patient is advised to contact this APRN/this office if any questions or concerns arise at any time (at [phone]), or call 911/go to the closest emergency department if needed or outside of office hours.       [facility] No Show Policy:  We understand unexpected circumstances arise; however, anytime you miss your appointment we are unable to provide you appropriate care.  In addition, each appointment missed could have been used to provide care for others.  We ask that you call at least 24 hours in advance to cancel or reschedule an appointment.  We would like to take this opportunity to remind you of our policy  stating patients who miss THREE or more appointments without cancelling or rescheduling 24 hours in advance of the appointment may be subject to cancellation of any further visits with our clinic and recommendation to seek in-person services/visits.    Please call [phone] to reschedule your appointment. If there are reasons that make it difficult for you to keep the appointments, please call and let us know how we can help.  Please understand that medication prescribing will not continue without seeing your provider.      [facility]'s No Show Policy reviewed with patient at today's visit. Patient verbalized understanding of this policy. Discussed with patient that in the event that there are three or more no show visits, it will be recommended that they pursue in-person services/visits as noncompliance with telehealth visits indicates that patient is not an appropriate candidate for telemedicine and would likely be more appropriate for in-person services/visits. Patient verbalizes understanding and is agreeable to this.           MEDS ORDERED DURING VISIT:  New Medications Ordered This Visit   Medications   • Cariprazine HCl (VRAYLAR) 4.5 MG capsule capsule     Sig: Take 1 capsule by mouth Daily.     Dispense:  30 capsule     Refill:  0   • buPROPion XL (WELLBUTRIN XL) 150 MG 24 hr tablet     Sig: Take 3 tablets by mouth Every Morning.     Dispense:  90 tablet     Refill:  0   • sertraline (ZOLOFT) 25 MG tablet     Sig: Take 1 tablet by mouth Daily.     Dispense:  30 tablet     Refill:  0       Return in about 4 weeks (around 3/6/2025), or if symptoms worsen or fail to improve, for Recheck.       Patient will follow-up in 4 weeks, highly encouraged the patient if she had any questions or concerns to contact the behavioral health virtual clinic for sooner appointment patient verbalized understanding.      Functional Status: Moderate impairment     Prognosis: Good with Ongoing Treatment              This document has been electronically signed by ECTOR Gerardo  February 6, 2025 11:32 EST      Some of the data in this electronic note has been brought forward from a previous encounter, any necessary changes have been made, it has been reviewed by this APRN, and it is accurate.       Part of this note may be an electronic transcription/translation of spoken language to printed text using the Dragon Dictation System.

## 2025-02-11 ENCOUNTER — TELEMEDICINE (OUTPATIENT)
Dept: SLEEP MEDICINE | Facility: CLINIC | Age: 30
End: 2025-02-11
Payer: COMMERCIAL

## 2025-02-11 VITALS — HEIGHT: 64 IN | WEIGHT: 169 LBS | BODY MASS INDEX: 28.85 KG/M2

## 2025-02-11 DIAGNOSIS — G43.009 MIGRAINE WITHOUT AURA AND WITHOUT STATUS MIGRAINOSUS, NOT INTRACTABLE: ICD-10-CM

## 2025-02-11 DIAGNOSIS — G47.11 IDIOPATHIC HYPERSOMNIA: Primary | ICD-10-CM

## 2025-02-11 PROCEDURE — 99213 OFFICE O/P EST LOW 20 MIN: CPT | Performed by: NURSE PRACTITIONER

## 2025-02-11 RX ORDER — ATOGEPANT 60 MG/1
60 TABLET ORAL DAILY
Qty: 14 TABLET | Refills: 0 | COMMUNITY
Start: 2025-02-11

## 2025-02-11 RX ORDER — MODAFINIL 100 MG/1
100 TABLET ORAL DAILY
Qty: 30 TABLET | Refills: 1 | Status: SHIPPED | OUTPATIENT
Start: 2025-02-11

## 2025-02-11 NOTE — PROGRESS NOTES
Chief Complaint:   Chief Complaint   Patient presents with    Follow-up       HPI:    Elicia Arndt is a 29 y.o. female here for follow-up of sleep studies.  Patient was seen in consult 6/19/2024.       States she has recently had a Holter monitor and  thinks within the last 8 months and did get report of abnormal rhythm during her study however does not know any more details other than that.  She does know she snores, has witnessed apneas, has morning headaches 3-4 times a week and excessive daytime sleepiness.  Patient has no history of head injury, nasal fracture, or sleep paralysis.  She has at times seeing people when she is trying to go to sleep sometimes 1-2 times a week.     During the week patient will go to bed at the same time as the weekend going to bed at 10 PM awakening at 4 AM.  She estimates she is getting 5 hours of sleep nightly.  She is not rested upon awakening,.  Patient does toss and turn frequently throughout the night.  Patient has an [place] score of 14/24.  Patient had both HST and in lab PSG that were negative for sleep apnea we will now move forward with low-dose Provigil for idiopathic hypersomnia.        Current medications are:   Current Outpatient Medications:     Atogepant (Qulipta) 60 MG tablet, Take 1 tablet by mouth Daily., Disp: 14 tablet, Rfl: 0    buPROPion XL (WELLBUTRIN XL) 150 MG 24 hr tablet, Take 3 tablets by mouth Every Morning., Disp: 90 tablet, Rfl: 0    Cariprazine HCl (VRAYLAR) 4.5 MG capsule capsule, Take 1 capsule by mouth Daily., Disp: 30 capsule, Rfl: 0    hydroCHLOROthiazide 25 MG tablet, TAKE 1 TABLET BY MOUTH DAILY, Disp: 90 tablet, Rfl: 1    losartan (COZAAR) 50 MG tablet, TAKE 1 TABLET BY MOUTH DAILY, Disp: 90 tablet, Rfl: 1    modafinil (PROVIGIL) 100 MG tablet, Take 1 tablet by mouth Daily., Disp: 30 tablet, Rfl: 1    nebivolol (BYSTOLIC) 5 MG tablet, Take 1 tablet by mouth Daily., Disp: 30 tablet, Rfl: 3    sertraline (ZOLOFT) 25 MG tablet, Take 1  "tablet by mouth Daily., Disp: 30 tablet, Rfl: 0    ubrogepant (Ubrelvy) 100 MG tablet, Take one tablet with onset of headache; if symptoms persist or return, may repeat dose after >=2 hours., Disp: 9 tablet, Rfl: 0.      The patient's relevant past medical, surgical, family and social history were reviewed and updated in Epic as appropriate.       Review of Systems   Constitutional:  Positive for fatigue.   Gastrointestinal:  Positive for constipation.   Neurological:  Positive for headaches.   Psychiatric/Behavioral:  Positive for dysphoric mood and sleep disturbance. The patient is nervous/anxious.    All other systems reviewed and are negative.        Objective:    Physical Exam  Constitutional:       Appearance: Normal appearance.   HENT:      Head: Normocephalic and atraumatic.   Pulmonary:      Effort: Pulmonary effort is normal. No respiratory distress.   Neurological:      Mental Status: She is alert and oriented to person, place, and time.   Psychiatric:         Mood and Affect: Mood normal.         Behavior: Behavior normal.         Thought Content: Thought content normal.         Judgment: Judgment normal.         Ht 162.6 cm (64\")   Wt 76.7 kg (169 lb)   BMI 29.01 kg/m²       ASSESSMENT/PLAN    Diagnoses and all orders for this visit:    1. Idiopathic hypersomnia (Primary)  -     modafinil (PROVIGIL) 100 MG tablet; Take 1 tablet by mouth Daily.  Dispense: 30 tablet; Refill: 1        Provigil 100 mg p.o. every morning I will follow-up in 4 weeks to reassess.    The patient is located in [facility] at home. The patient presents today for telehealth service.  This service was conducted via audio/video technology through a secure [employer] video visit connection through Epic.  This provider is located in [facility].  Patient stated they are in a secure environment for the session.  Patient's condition being diagnosed/treated is appropriate for telemedicine.  The provider identified himself as " well as his credentials.  The patient, and/or patient's guardian, consent to be seen remotely, and when consent is given they understanding that the consent allows for patient identifiable information to be sent to a third-party as needed.  They may refuse to be seen remotely at any time.  The electronic data is encrypted and password protected, and the patient and/or guardian has been advised of the potential risk to privacy not withstanding such measures.  Patient identifiers used: Name and date of birth.   I have reviewed the results of my evaluation and impression and discussed my recommendations in detail with the patient.      Signed by  ECTOR Gaines    February 11, 2025      CC: Arina Venegas APRN         No ref. provider found

## 2025-02-14 ENCOUNTER — TELEMEDICINE (OUTPATIENT)
Dept: PSYCHIATRY | Facility: CLINIC | Age: 30
End: 2025-02-14
Payer: COMMERCIAL

## 2025-02-14 DIAGNOSIS — F43.10 POST TRAUMATIC STRESS DISORDER (PTSD): ICD-10-CM

## 2025-02-14 DIAGNOSIS — F33.1 MAJOR DEPRESSIVE DISORDER, RECURRENT EPISODE, MODERATE: ICD-10-CM

## 2025-02-14 DIAGNOSIS — F41.1 GENERALIZED ANXIETY DISORDER: Primary | ICD-10-CM

## 2025-02-14 NOTE — PLAN OF CARE
Patient was engaged in reviewing treatment progress. Patient recognizes that recent life events have negatively impacted her anxiety. Patient stated she wants to specifically work towards what is within her control for school. Patient also wants to work on balancing her responsibilities between school and home life through consistent routines.   Problem: Anxiety 5  Goal: Patient will develop and implement behavioral and cognitive strategies to reduce anxiety and irrational fears.  Outcome: Progressing     Problem: Depression 5  Goal: Patient will demonstrate the ability to initiate new constructive life skills outside of sessions on a consistent basis.  Outcome: Progressing

## 2025-02-14 NOTE — PROGRESS NOTES
[facility]   Follow-up Progress Note     Date: February 14, 2025  Time In: 11:02  Time Out: 11:46      PROGRESS NOTE  Data:  Elicia Arndt is a 29 y.o. female presenting to [facility] (through [facility] [address], [place], [address] using a secure MyChart Video Visit through [facility] for assessment with Chas Ramirez LCSW. The patient is seen remotely in their  home  using [facility] My Chart. Patient is being seen via telehealth and stated they are in a secure environment for this session. The patient's condition being diagnosed/treated is appropriate for telemedicine. The provider identified herself as well as her credentials. The patient and/or patients guardian consent to be seen remotely, and when consent is given they understand that the consent allows for patient identifiable information to be sent to a third party as needed. They may refuse to be seen remotely at any time. The electronic data is encrypted and password protected, and the patient has been advised of the potential risks to privacy not withstanding such measures.    Today Patient stated she has been trying to support her daughters after a student at their school passed away from the flu. Patient reported that she has been struggling with maintain her school assignments. Discussed with patient how recent sickness, helping with her father in law's recovery, and weather has negatively impacted her typical routine. Patient reported that she has been prescribed medication to assist with her staying awake during the day. Patient informed provider that she recently did a sleep study and learned she does not have sleep apena and is working with her providers to assess why she is struggling to stay awake during the day. Patient stated she has stopped speaking to her father. Patient stated she is tired of him making accusations again patient.  Processed with patient needing to maintain boundaries within her relationship with her father. Patient engaged in reviewing treatment progress and goals.       Clinical Maneuvering/Intervention:    Chief Complaint: anxiety, depression, past trauma    (Scales based on 0 - 10 with 10 being the worst)  Depression: 2 Anxiety: 4     Assisted Patient in processing above session content; acknowledged and normalized patient’s thoughts, feelings, and concerns.  Rationalized patient thought process regarding feeling constant fagitue that is negatively impacting patient's ability to maintain routines.  Discussed triggers associated with patient's anxiety.  Also discussed coping skills for patient to implement such as focusing on what is within patient's control.    Allowed Patient to freely discuss issues  without interruption or judgement with unconditional positive regard, active listening skills, and empathy. Therapist provided a safe, confidential environment to facilitate the development of a positive therapeutic relationship and encouraged open, honest communication. Assisted Patient in identifying risk factors which would indicate the need for higher level of care including thoughts to harm self or others and/or self-harming behavior and encouraged Patient to contact this office, call 911, or present to the nearest emergency room should any of these events occur. Discussed crisis intervention services and means to access. Patient adamantly and convincingly denies current suicidal or homicidal ideation or perceptual disturbance. Assisted Patient in processing session content; acknowledged and normalized Patient’s thoughts, feelings, and concerns by utilizing a person-centered approach in efforts to build appropriate rapport and a positive therapeutic relationship with open and honest communication. Therapist utilized dialectical behavior techniques to teach and model emotional regulation and relaxation methods. Therapist  assisted Patient with identifying and implementing healthier coping strategies.     Assessment   Patient appears to be experiencing heightened anxiety and depression in response to COVID-19 epidemic  As a result, they can be reasonably expected to continue to benefit from treatment and would likely be at increased risk for decompensation otherwise.    Mental Status Exam:   Hygiene:   good  Cooperation:  Cooperative  Eye Contact:  Good  Psychomotor Behavior:  Appropriate  Affect:  Full range  Mood:  tired  Speech:  Normal  Thought Process:  Goal directed  Thought Content:  Mood congruent  Suicidal:  None  Homicidal:  None  Hallucinations:  None  Delusion:  None  Memory:  Intact  Orientation:  Person, Place, Time, and Situation  Reliability:  good  Insight:  Good  Judgement:  Good  Impulse Control:  Good  Physical/Medical Issues:  No      PHQ-Score Total:  PHQ-9 Total Score:        Patient's Support Network Includes:   and mother    Functional Status: Moderate impairment     Progress toward goal: Not at goal    Prognosis: Good with Ongoing Treatment            Impression/Formulation:    VISIT DIAGNOSIS:     ICD-10-CM ICD-9-CM   1. Generalized anxiety disorder  F41.1 300.02   2. Major depressive disorder, recurrent episode, moderate  F33.1 296.32   3. Post traumatic stress disorder (PTSD)  F43.10 309.81        Patient appeared alert and oriented.  Patient is voluntarily requesting to continue outpatient therapy at [facility].  Patient is receptive to assistance with maintaining a stable lifestyle.  Patient presents with history of anxiety, depression, and past trauma.  Patient is agreeable to attend routine therapy sessions.  Patient expressed desire to maintain stability and participate in the therapeutic process.        Crisis Plan:  Symptoms and/or behaviors to indicate a crisis: Excessive worry or fear and Feeling sad or low    What calming techniques or other strategies  will patient use to de-esclate and stay safe: slow down, breathe, visualize calming self, think it though, listen to music, change focus, take a walk    Who is one person patient can contact to assist with de-escalation? mother    If symptoms/behaviors persist, Patient will present to the nearest hospital for an assessment. Advised patient of [facility] ER and assessment services.     Plan:   Patient will continue in individual outpatient therapy with focus on improved functioning and coping skills, maintaining stability, and avoiding decompensation and the need for higher level of care.    Patient will contact this office ([facility] at [phone]), call 911 or present to the nearest emergency room should suicidal or homicidal ideations occur. Provide Cognitive Behavioral Therapy and Solution Focused Therapy to improve functioning, maintain stability, and avoid decompensation and the need for higher level of care.     Return in about 4 weeks, or earlier if symptoms worsen or fail to improve.    Recommended Referrals: none        This document has been electronically signed by Chas Ramirez LCSW  February 14, 2025 11:02 EST        Part of this note may be an electronic transcription/translation of spoken language to printed text using the Dragon Dictation System.    Mode of Visit: Video  Location of patient: -HOME-  Location of provider: +HOME+  You have chosen to receive care through a telehealth visit.  The patient has signed the video visit consent form.  The visit included audio and video interaction. No technical issues occurred during this visit.

## 2025-02-22 DIAGNOSIS — G43.009 MIGRAINE WITHOUT AURA AND WITHOUT STATUS MIGRAINOSUS, NOT INTRACTABLE: ICD-10-CM

## 2025-02-24 RX ORDER — ATOGEPANT 60 MG/1
60 TABLET ORAL DAILY
Qty: 30 TABLET | Refills: 0 | Status: SHIPPED | OUTPATIENT
Start: 2025-02-24

## 2025-02-26 ENCOUNTER — PRIOR AUTHORIZATION (OUTPATIENT)
Dept: INTERNAL MEDICINE | Facility: CLINIC | Age: 30
End: 2025-02-26
Payer: COMMERCIAL

## 2025-03-06 ENCOUNTER — TELEMEDICINE (OUTPATIENT)
Dept: PSYCHIATRY | Facility: CLINIC | Age: 30
End: 2025-03-06
Payer: COMMERCIAL

## 2025-03-06 DIAGNOSIS — F39 UNSPECIFIED MOOD (AFFECTIVE) DISORDER: Primary | Chronic | ICD-10-CM

## 2025-03-06 DIAGNOSIS — F41.1 GENERALIZED ANXIETY DISORDER: Chronic | ICD-10-CM

## 2025-03-06 RX ORDER — BUPROPION HYDROCHLORIDE 150 MG/1
450 TABLET ORAL EVERY MORNING
Qty: 90 TABLET | Refills: 0 | Status: SHIPPED | OUTPATIENT
Start: 2025-03-06

## 2025-03-06 NOTE — PROGRESS NOTES
This provider is located at home office working remotely through the [facility] (through [facility]), [address], [facility], [address] using a secure [email or web address] Video Visit through [employer]. Patient is being seen remotely via telehealth at their home address in [place], and stated they are in a secure environment for this session. The patient's condition being diagnosed/treated is appropriate for telemedicine. The provider identified herself as well as her credentials. The patient, and/or patients guardian, consent to be seen remotely, and when consent is given they understand that the consent allows for patient identifiable information to be sent to a third party as needed. They may refuse to be seen remotely at any time. The electronic data is encrypted and password protected, and the patient and/or guardian has been advised of the potential risks to privacy not withstanding such measures.    You have chosen to receive care through a telehealth visit.  Do you consent to use a video/audio connection for your medical care today? Yes     Patient identifiers utilized: Name and date of birth.    Patient verbally confirmed consent for today's encounter  03/06/2025 .    The patient does verbally confirm they are being seen today while physically located in the [facility].  This provider/this APRN is licensed in the [facility] where the patient is located/being seen.           Subjective   Elicia Arndt is a 29 y.o. female who is here today for medication management follow up.    Chief Complaint:  Depression, anxiety, insomnia     History of Present Illness:  -Last visit with this APRN: 02/06/2025  -Since last encounter with this APRN/Office: The patient reports that overall she has been doing okay over the past few weeks.  She states that she has been taking the Zoloft, but states that she has not noticed any improvements with initiating treatment with this  "medication.  She states that she has tolerated medication well and denies any side effects with it, but states that she does not feel that current dose has been beneficial for her.  Discussed with the patient that current dose may be subtherapeutic but we initiated treatment at a low dose to ensure tolerability given history of previous medication trials/failures and side effects.  Discussed with the patient that increasing the dose may help to improve symptoms, and given the fact that she has tolerated the medication well thus far without any side effects is promising that she may continue to tolerate well without any or only minimal side effects.  -Mood reported as: \"okay\"  -Patient rates symptoms of depression at a 6/10 on a 0-10 scale, with 10 being the worst.   -Patient rates symptoms of anxiety at a 6/10 on a 0-10 scale, with 10 being the worst.    -Appetite reported as: \"good\"    -Changes in medications or new medical problems/concerns since last visit: Reports she was recently started on Provigil by sleep medicine.  Reports that she has tolerated this well and denies any changes in mental health symptoms since initiating treatment with this medication.  Otherwise denies any changes in medications or new medical problems/concerns since last visit.  -Reported medication compliance: The patient reports compliance with their current psychotropic medication regimen.    -Reported medication side effects or concerns: Denies    -Auditory or visual hallucinations: Reports daily AVH, but states that this has been her baseline for over 15 years now.  Denies any recent deviation from baseline or any other symptoms of psychosis.  -Behaviors different from patient baseline, or any reckless, impulsive, or risky behaviors: Denies  -Symptoms of more or psychosis: Denies  -Self-injurious behavior: Denies  -SI/HI: The patient adamantly denies any suicidal or homicidal ideations, plans, or intent at the time of this encounter " and is convincing.  -No abnormal muscle movements or tics noted by this APRN during today's encounter, and the patient denies any of these symptoms.      -Using a shared decision-making approach the patient reports she would be interested in increasing the dose of Zoloft in effort to obtain better management of symptoms at this time.    -The patient does verbally contract for safety at today's encounter and is in verbal agreement with the safety/crisis plan. The patient reports in their own words that they will reach out to this APRN/office prior to next scheduled appointment if there is any worsening of mood, any new psychiatric symptoms, any medication side effects or concerns, any concern for safety to self or others, any suicidal or homicidal ideations plans or intent, or any concerns, or they will call 911, call or text the suicide and crisis lifeline at [phone], or go to the closest emergency department.            Last Menstrual Period:  Reports she doesn't have regular menstrual cycles due to her Nexplanon.    The patient was educated that her prescribed medications can have potential risk to a developing fetus. The patient is advised to contact this APRN/this office if she becomes pregnant or plans to become pregnant.  Pt verbalizes understanding and acknowledged agreement with this plan in her own words.       Prior Psychiatric Medications:  Effexor - reports ineffective      The following portions of the patient's history were reviewed and updated as appropriate: allergies, current medications, past family history, past medical history, past social history, past surgical history, and problem list.    Review of Systems   Constitutional:  Negative for activity change, appetite change, fatigue and unexpected weight change.   Psychiatric/Behavioral:  Positive for dysphoric mood and hallucinations (Reports daily AVH, but states that this has been her baseline for over 15 years now.  Denies any recent deviation  from baseline or any other symptoms of psychosis.). Negative for agitation, behavioral problems, confusion, decreased concentration, self-injury, sleep disturbance and suicidal ideas. The patient is nervous/anxious. The patient is not hyperactive.        Objective   Physical Exam  Constitutional:       Appearance: Normal appearance.   Neurological:      Mental Status: She is alert.   Psychiatric:         Attention and Perception: Attention normal. She perceives auditory (Reports daily AVH, but states that this has been her baseline for over 15 years now.  Denies any recent deviation from baseline or any other symptoms of psychosis.) and visual (Reports daily AVH, but states that this has been her baseline for over 15 years now.  Denies any recent deviation from baseline or any other symptoms of psychosis.) hallucinations.         Mood and Affect: Affect normal. Mood is anxious and depressed.         Speech: Speech normal.         Behavior: Behavior normal. Behavior is cooperative.         Thought Content: Thought content normal. Thought content is not paranoid or delusional. Thought content does not include homicidal or suicidal ideation. Thought content does not include homicidal or suicidal plan.         Cognition and Memory: Cognition and memory normal.         Judgment: Judgment normal.       not currently breastfeeding.    The patient was seen remotely today via a MyChart Video Visit through [employer].  Unable to obtain vital signs due to nature of remote visit.  Height stated at 64 inches.  Weight stated at 169 pounds.     Due to the nature of virtual visits and inability to monitor vital signs and weight with virtual visits, the patient has been encouraged to monitor their vital signs and weight regularly either through self-monitoring via home device(s) or with their Primary Care Provider, and the patient has been instructed to notify this APRN of any abnormalities or significant changes from baseline.        Allergies   Allergen Reactions    Adhesive Tape Hives     Red and hives         No results found for this or any previous visit (from the past 12 weeks).    Current Medications:   Current Outpatient Medications   Medication Sig Dispense Refill    Atogepant (Qulipta) 60 MG tablet Take 1 tablet by mouth Daily. 30 tablet 0    buPROPion XL (WELLBUTRIN XL) 150 MG 24 hr tablet Take 3 tablets by mouth Every Morning. 90 tablet 0    Cariprazine HCl (VRAYLAR) 4.5 MG capsule capsule Take 1 capsule by mouth Daily. 30 capsule 0    hydroCHLOROthiazide 25 MG tablet TAKE 1 TABLET BY MOUTH DAILY 90 tablet 1    losartan (COZAAR) 50 MG tablet TAKE 1 TABLET BY MOUTH DAILY 90 tablet 1    modafinil (PROVIGIL) 100 MG tablet Take 1 tablet by mouth Daily. 30 tablet 1    nebivolol (BYSTOLIC) 5 MG tablet Take 1 tablet by mouth Daily. 30 tablet 3    sertraline (ZOLOFT) 50 MG tablet Take 1 tablet by mouth Daily. 30 tablet 0    ubrogepant (Ubrelvy) 100 MG tablet Take one tablet with onset of headache; if symptoms persist or return, may repeat dose after >=2 hours. 9 tablet 0     No current facility-administered medications for this visit.       Mental Status Exam:   Hygiene:   good  Cooperation:  Cooperative  Eye Contact:  Good  Psychomotor Behavior:  Appropriate  Affect:  Full range  Hopelessness: Denies  Speech:  Normal  Thought Process:  Goal directed and Linear  Thought Content:  Normal  Suicidal:  None  Homicidal:  None  Hallucinations:   Reports daily AVH, but states that this has been her baseline for over 15 years now.  Denies any recent deviation from baseline or any other symptoms of psychosis.  Delusion:  None  Memory:  Intact  Orientation:  Person, Place, Time, and Situation  Reliability:  good  Insight:  Good  Judgement:  Good  Impulse Control:  Good  Physical/Medical Issues:  Yes see medical history        PHQ-9 Depression Screening  Little interest or pleasure in doing things? (Patient-Rptd) Several days   Feeling down,  depressed, or hopeless? (Patient-Rptd) Several days   PHQ-2 Total Score (Patient-Rptd) 2   Trouble falling or staying asleep, or sleeping too much? (Patient-Rptd) Several days   Feeling tired or having little energy? (Patient-Rptd) Several days   Poor appetite or overeating? (Patient-Rptd) Several days   Feeling bad about yourself - or that you are a failure or have let yourself or your family down? (Patient-Rptd) Several days   Trouble concentrating on things, such as reading the newspaper or watching television? (Patient-Rptd) Several days   Moving or speaking so slowly that other people could have noticed? Or the opposite - being so fidgety or restless that you have been moving around a lot more than usual? (Patient-Rptd) Several days   Thoughts that you would be better off dead, or of hurting yourself in some way? (Patient-Rptd) Not at all   PHQ-9 Total Score (Patient-Rptd) 8   If you checked off any problems, how difficult have these problems made it for you to do your work, take care of things at home, or get along with other people? (Patient-Rptd) Extremely difficult           DREW-7  Feeling nervous, anxious or on edge: (Patient-Rptd) Several days  Not being able to stop or control worrying: (Patient-Rptd) Several days  Worrying too much about different things: (Patient-Rptd) Several days  Trouble Relaxing: (Patient-Rptd) Several days  Being so restless that it is hard to sit still: (Patient-Rptd) Several days  Feeling afraid as if something awful might happen: (Patient-Rptd) Not at all  Becoming easily annoyed or irritable: (Patient-Rptd) Several days  DREW 7 Total Score: (Patient-Rptd) 6  If you checked any problems, how difficult have these problems made it for you to do your work, take care of things at home, or get along with other people: (Patient-Rptd) Somewhat difficult          Assessment & Plan   Diagnoses and all orders for this visit:    1. Unspecified mood (affective) disorder (Primary)  -      buPROPion XL (WELLBUTRIN XL) 150 MG 24 hr tablet; Take 3 tablets by mouth Every Morning.  Dispense: 90 tablet; Refill: 0  -     Cariprazine HCl (VRAYLAR) 4.5 MG capsule capsule; Take 1 capsule by mouth Daily.  Dispense: 30 capsule; Refill: 0  -     sertraline (ZOLOFT) 50 MG tablet; Take 1 tablet by mouth Daily.  Dispense: 30 tablet; Refill: 0    2. Generalized anxiety disorder  -     buPROPion XL (WELLBUTRIN XL) 150 MG 24 hr tablet; Take 3 tablets by mouth Every Morning.  Dispense: 90 tablet; Refill: 0  -     sertraline (ZOLOFT) 50 MG tablet; Take 1 tablet by mouth Daily.  Dispense: 30 tablet; Refill: 0                Visit Diagnoses:    ICD-10-CM ICD-9-CM   1. Unspecified mood (affective) disorder  F39 296.90   2. Generalized anxiety disorder  F41.1 300.02           GOALS:  Short Term Goals: Patient will be compliant with medication, and patient will have no significant medication related side effects.  Patient will be engaged in psychotherapy as indicated.  Patient will report subjective improvement of symptoms.  Long term goals: To stabilize mood and treat/improve subjective symptoms, the patient will stay out of the hospital, the patient will be at an optimal level of functioning, and the patient will take all medications as prescribed.  The patient verbalized understanding and agreement with goals that were mutually set.      SUICIDE RISK ASSESSMENT: Unalterable demographics and a history of mental health intervention indicate this patient is in a high risk category compared to the general population. At present, the patient denies active SI/HI, intentions, or plans at this time and agrees to seek immediate care should such thoughts develop. The patient verbalizes understanding of how to access emergency care if needed and agrees to do so. Consideration of suicide risk and protective factors such as history, current presentation, individual strengths and weaknesses, psychosocial and environmental stressors and  variables, psychiatric illness and symptoms, medical conditions and pain, took place in this interview. Based on those considerations, the patient is determined: within individual baseline and presenting no imminent risk for suicide or homicide. Other recommendations: The patient does not meet the criteria for inpatient admission and is not a safety risk to self or others at today's visit. Inpatient treatment offers no significant advantages over outpatient treatment for this patient at today's visit.      SAFETY PLAN:  Patient was given ample time for questions and fully participated in treatment planning.  Patient was encouraged to call the clinic with any questions or concerns.  Patient was informed of access to emergency care. If patient were to develop any significant symptomatology, suicidal ideation, homicidal ideation, any concerns, or feel unsafe at any time they are to call the clinic and if unable to get immediate assistance should immediately call 911 or go to the nearest emergency room.  The patient is advised to remove or secure (lock away) all lethal weapons (including guns) and sharps (including razors, scissors, knives, etc.).  All medications (including any prescribed and any over the counter medications) should be stored in a safe and secured location that is not obtainable by children/adolescents.  Patient was given an opportunity and encouraged to ask questions about their medication, illness, and treatment. Patient contracted verbally for the following: If you are experiencing an emotional crisis or have thoughts of harming yourself or others, please go to your nearest local emergency room or call 911. Will continue to re-assess medication response and side effects frequently to establish efficacy and ensure safety. Risks, any black box warnings, side effects, off label usage, and benefits of medication and treatment discussed with patient, along with potential adverse side effects of current  and/or newly prescribed medication, alternative treatment options, and OTC medications.  Patient verbalized understanding of potential risks, any off label use of medication, any black box warnings, and any side effects in their own words. The patient verbalized understanding and agreed to comply with the safety plan discussed in their own words.  Patient given the number to the office. Number also available to the 24- hour suicide hotline.       TREATMENT PLAN/GOALS: Continue medications and treatment plan as indicated. Treatment and medication options discussed during today's visit. Patient acknowledged and verbally consented to continue with current treatment plan and was educated on the importance of compliance with treatment and follow-up appointments.        -Increase Zoloft to 50 mg daily for mood/anxiety  -Continue Wellbutrin  mg daily for mood/anxiety  -Continue Vraylar 4.5 mg daily as adjunct for mood/anxiety  -Continue psychotherapy with Chas Ramirez LCSW  -Rule out psychotic disorder potentially contributing to reported hallucinations        MEDICATION ISSUES: Discussed medication options and treatment plan of prescribed medication, any off label use of medication, as well as the risks, benefits, any black box warnings including increased suicidality, and side effects including but not limited to potential falls, dizziness, possible impaired driving, GI side effects (change in appetite, abdominal discomfort, nausea, vomiting, diarrhea, and/or constipation), dry mouth, somnolence, sedation, insomnia, activation, agitation, irritation, tremors, abnormal muscle movements or disorders, tardive dyskinesia, akathisia, asthenia, headache, sweating, possible bruising or rare bleeding, electrolyte and/or fluid abnormalities, change in blood pressure/heart rate/and or heart rhythm, hypotension, sexual dysfunction, rare impulse control problems, rare seizures, rare neuroleptic malignant syndrome,  increased risk of death and cerebrovascular events, change in blood glucose and increased risk for diabetes, change in triglycerides and cholesterol and increased risk for dyslipidemia,  weight gain, weight gain that can become problematic to health, skin conditions and reactions, and metabolic adversities among others. Patient and/or guardian are agreeable to call the office with any worsening of symptoms or onset of side effects, or if any concerns or questions arise.  The contact information for the office is made available to the patient and/or guardian. Patient and/or guardian are agreeable to call 911 or go to the nearest ER should they begin having any SI/HI, or if any urgent concerns arise. No medication side effects or related complaints today.    This APRN has discussed with the patient/guardian about the possibility of serotonin syndrome when certain medications are taken together, as is the case with this patient.  This APRN has provided the patient/guardian with a list of symptoms of serotonin syndrome including symptoms of autonomic instability, altered sensorium, confusion, restlessness, agitation, myoclonus, hyperreflexia, hyperthermia, diaphoresis, tremor, chills, diarrhea and cramps, ataxia, headache, migraines, seizures, and insomnia; which could lead to permanent hyperthermic brain damage, cardiovascular collapse, coma, or even death.  The patient/guardian are instructed to stop medications immediately and either contact this APRN/this office during regular office hours, or go to the emergency department/call 911, if they begin to experience any of the symptoms discussed.  The benefits and risks of the current medication regimen are discussed with the patient/guardian, and they feel that the benefits out weigh the risks.  The patient/guardian verbalized understanding and agreement in their own words.              VERBAL INFORMED CONSENT FOR MEDICATION:  The patient was educated that their  proposed/prescribed psychotropic medication(s) has potential risks, side effects, adverse effects, and black box warnings; and these have been discussed with the patient.  The patient has been informed that their treatment and medication dosage is to be individualized, and may even be above or below the recommended range/dosage due to patient individualization and response, but medication is prescribed using a shared decision making approach, and no medication or dosage will be prescribed without the patient's verbal consent.  The reason for the use of the medication including any off label use and alternative modes of treatment other than or in addition to medication has been considered and discussed, the probable consequences of not receiving the proposed treatment have been discussed, and any treatment side effects, black box warnings, and cautions associated with treatment have been discussed with the patient.  The patient is allowed ample time to openly discuss and ask questions regarding the proposed medication(s) and treatment plan and the patient verbalizes understanding the reasons for the use of the medication, its potential risks and benefits, other alternative treatment(s), and the probable consequences that may occur if the proposed medication is not given.  The patient has been given ample time to ask questions and study the information and find the information to be specific, accurate, and complete.  The patient gives verbal consent for the medication(s) proposed/prescribed, they verbalized understanding that they can refuse and withdraw consent at any time with the assistance of this APRN, and the patient has verbally confirmed that they are aware, and are willing, to take the prescribed medication and follow the treatment plan with the known possible risks, side effect, black box warnings, and any potential medication interactions, and the patient reports they will be worse off without this  medication and treatment plan.  The patient is advised to contact this APRN/this office if any questions or concerns arise at any time (at [phone]), or call 911/go to the closest emergency department if needed or outside of office hours.       [facility] No Show Policy:  We understand unexpected circumstances arise; however, anytime you miss your appointment we are unable to provide you appropriate care.  In addition, each appointment missed could have been used to provide care for others.  We ask that you call at least 24 hours in advance to cancel or reschedule an appointment.  We would like to take this opportunity to remind you of our policy stating patients who miss THREE or more appointments without cancelling or rescheduling 24 hours in advance of the appointment may be subject to cancellation of any further visits with our clinic and recommendation to seek in-person services/visits.    Please call [phone] to reschedule your appointment. If there are reasons that make it difficult for you to keep the appointments, please call and let us know how we can help.  Please understand that medication prescribing will not continue without seeing your provider.      [facility]'s No Show Policy reviewed with patient at today's visit. Patient verbalized understanding of this policy. Discussed with patient that in the event that there are three or more no show visits, it will be recommended that they pursue in-person services/visits as noncompliance with telehealth visits indicates that patient is not an appropriate candidate for telemedicine and would likely be more appropriate for in-person services/visits. Patient verbalizes understanding and is agreeable to this.           MEDS ORDERED DURING VISIT:  New Medications Ordered This Visit   Medications    buPROPion XL (WELLBUTRIN XL) 150 MG 24 hr tablet     Sig: Take 3 tablets by mouth Every Morning.     Dispense:   90 tablet     Refill:  0    Cariprazine HCl (VRAYLAR) 4.5 MG capsule capsule     Sig: Take 1 capsule by mouth Daily.     Dispense:  30 capsule     Refill:  0    sertraline (ZOLOFT) 50 MG tablet     Sig: Take 1 tablet by mouth Daily.     Dispense:  30 tablet     Refill:  0       Return in about 4 weeks (around 4/3/2025), or if symptoms worsen or fail to improve, for Recheck.       Patient will follow-up in 4 weeks, highly encouraged the patient if she had any questions or concerns to contact the behavioral health [facility] clinic for sooner appointment patient verbalized understanding.      Functional Status: Moderate impairment     Prognosis: Good with Ongoing Treatment             This document has been electronically signed by ECTOR Gerardo  March 6, 2025 09:55 EST      Some of the data in this electronic note has been brought forward from a previous encounter, any necessary changes have been made, it has been reviewed by this APRN, and it is accurate.       Part of this note may be an electronic transcription/translation of spoken language to printed text using the Dragon Dictation System.

## 2025-03-10 RX ORDER — NEBIVOLOL 5 MG/1
5 TABLET ORAL DAILY
Qty: 30 TABLET | Refills: 3 | Status: SHIPPED | OUTPATIENT
Start: 2025-03-10

## 2025-03-14 ENCOUNTER — TELEMEDICINE (OUTPATIENT)
Dept: PSYCHIATRY | Facility: CLINIC | Age: 30
End: 2025-03-14
Payer: COMMERCIAL

## 2025-03-14 DIAGNOSIS — F41.1 GENERALIZED ANXIETY DISORDER: Primary | ICD-10-CM

## 2025-03-14 DIAGNOSIS — F43.10 POST TRAUMATIC STRESS DISORDER (PTSD): ICD-10-CM

## 2025-03-14 DIAGNOSIS — F33.1 MAJOR DEPRESSIVE DISORDER, RECURRENT EPISODE, MODERATE: ICD-10-CM

## 2025-03-14 NOTE — PROGRESS NOTES
"[facility]   Follow-up Progress Note     Date: March 14, 2025  Time In: 11:02  Time Out: 11:28      PROGRESS NOTE  Data:  Elicia Arndt is a 29 y.o. female presenting to [facility] (through [facility]), [address] [place], [address] using a secure MyChart Video Visit through [facility] for assessment with Chas Ramirez LCSW. The patient is seen remotely in their  home  using [facility] My Chart. Patient is being seen via telehealth and stated they are in a secure environment for this session. The patient's condition being diagnosed/treated is appropriate for telemedicine. The provider identified herself as well as her credentials. The patient and/or patients guardian consent to be seen remotely, and when consent is given they understand that the consent allows for patient identifiable information to be sent to a third party as needed. They may refuse to be seen remotely at any time. The electronic data is encrypted and password protected, and the patient has been advised of the potential risks to privacy not withstanding such measures.    Today Patient expressed \"everything has been good besides school.\" Patient stated she has continued to struggle with maintaining her school assignments. Patient stated she feels like as soon as an assignment is complete another is due. Discussed with patient changing her studying habits to create change in her school performance. Patient was engaged in recognizing if she is not changing her behaviors the outcomes won't change either. Patient expressed concern that her younger daughter might be getting bullied on the bus. Patient stated her medication also change to be able to assist with patient staying awake during the day. Patient stated this has been helpful in being able to complete tasks during the day. Patient reported that her grandmother [place] also [place] in [place] of the state now. Patient stated she believes it is for the best due to her grandmother's memory loss.       Clinical Maneuvering/Intervention:    Chief Complaint: anxiety, depression, past trauma    (Scales based on 0 - 10 with 10 being the worst)  Depression: 2 Anxiety: 4     Assisted Patient in processing above session content; acknowledged and normalized patient’s thoughts, feelings, and concerns.  Rationalized patient thought process regarding coping with feeling overwhelmed with school work.  Discussed triggers associated with patient's anxiety.  Also discussed coping skills for patient to implement such as changing study environment.    Allowed Patient to freely discuss issues  without interruption or judgement with unconditional positive regard, active listening skills, and empathy. Therapist provided a safe, confidential environment to facilitate the development of a positive therapeutic relationship and encouraged open, honest communication. Assisted Patient in identifying risk factors which would indicate the need for higher level of care including thoughts to harm self or others and/or self-harming behavior and encouraged Patient to contact this office, call 911, or present to the nearest emergency room should any of these events occur. Discussed crisis intervention services and means to access. Patient adamantly and convincingly denies current suicidal or homicidal ideation or perceptual disturbance. Assisted Patient in processing session content; acknowledged and normalized Patient’s thoughts, feelings, and concerns by utilizing a person-centered approach in efforts to build appropriate rapport and a positive therapeutic relationship with open and honest communication. Therapist utilized dialectical behavior techniques to teach and model emotional regulation and relaxation methods. Therapist assisted Patient with identifying and implementing healthier coping strategies.     Assessment    Patient appears to be experiencing heightened anxiety and depression in response to COVID-19 epidemic  As a result, they can be reasonably expected to continue to benefit from treatment and would likely be at increased risk for decompensation otherwise.    Mental Status Exam:   Hygiene:   good  Cooperation:  Cooperative  Eye Contact:  Good  Psychomotor Behavior:  Appropriate  Affect:  Full range  Mood:  overwhelmed  Speech:  Normal  Thought Process:  Goal directed  Thought Content:  Mood congruent  Suicidal:  None  Homicidal:  None  Hallucinations:  None  Delusion:  None  Memory:  Intact  Orientation:  Person, Place, Time, and Situation  Reliability:  good  Insight:  Good  Judgement:  Good  Impulse Control:  Good  Physical/Medical Issues:  No      PHQ-Score Total:  PHQ-9 Total Score:        Patient's Support Network Includes:   and mother    Functional Status: Moderate impairment     Progress toward goal: Not at goal    Prognosis: Good with Ongoing Treatment            Impression/Formulation:    VISIT DIAGNOSIS:     ICD-10-CM ICD-9-CM   1. Generalized anxiety disorder  F41.1 300.02   2. Major depressive disorder, recurrent episode, moderate  F33.1 296.32   3. Post traumatic stress disorder (PTSD)  F43.10 309.81        Patient appeared alert and oriented.  Patient is voluntarily requesting to continue outpatient therapy at [facility].  Patient is receptive to assistance with maintaining a stable lifestyle.  Patient presents with history of anxiety, depression, and past trauma.  Patient is agreeable to attend routine therapy sessions.  Patient expressed desire to maintain stability and participate in the therapeutic process.        Crisis Plan:  Symptoms and/or behaviors to indicate a crisis: Excessive worry or fear and Feeling sad or low    What calming techniques or other strategies will patient use to de-esclate and stay safe: slow down, breathe, visualize calming self,  think it though, listen to music, change focus, take a walk    Who is one person patient can contact to assist with de-escalation? mother    If symptoms/behaviors persist, Patient will present to the nearest hospital for an assessment. Advised patient of [facility] ER and assessment services.     Plan:   Patient will continue in individual outpatient therapy with focus on improved functioning and coping skills, maintaining stability, and avoiding decompensation and the need for higher level of care.    Patient will contact this office ([facility] at [phone]), call 911 or present to the nearest emergency room should suicidal or homicidal ideations occur. Provide Cognitive Behavioral Therapy and Solution Focused Therapy to improve functioning, maintain stability, and avoid decompensation and the need for higher level of care.     Return in about 4 weeks, or earlier if symptoms worsen or fail to improve.    Recommended Referrals: none        This document has been electronically signed by Chas Ramirez LCSW  March 14, 2025 11:01 EDT        Part of this note may be an electronic transcription/translation of spoken language to printed text using the Dragon Dictation System.    Mode of Visit: Video  Location of patient: -HOME-  Location of provider: +HOME+  You have chosen to receive care through a telehealth visit.  The patient has signed the video visit consent form.  The visit included audio and video interaction. No technical issues occurred during this visit.

## 2025-03-24 DIAGNOSIS — G43.009 MIGRAINE WITHOUT AURA AND WITHOUT STATUS MIGRAINOSUS, NOT INTRACTABLE: ICD-10-CM

## 2025-03-25 RX ORDER — ATOGEPANT 60 MG/1
60 TABLET ORAL DAILY
Qty: 30 TABLET | Refills: 0 | Status: SHIPPED | OUTPATIENT
Start: 2025-03-25 | End: 2025-03-31 | Stop reason: SDUPTHER

## 2025-03-31 ENCOUNTER — OFFICE VISIT (OUTPATIENT)
Dept: INTERNAL MEDICINE | Facility: CLINIC | Age: 30
End: 2025-03-31
Payer: COMMERCIAL

## 2025-03-31 VITALS
RESPIRATION RATE: 12 BRPM | SYSTOLIC BLOOD PRESSURE: 112 MMHG | BODY MASS INDEX: 26.5 KG/M2 | WEIGHT: 155.2 LBS | DIASTOLIC BLOOD PRESSURE: 78 MMHG | HEART RATE: 92 BPM | HEIGHT: 64 IN | TEMPERATURE: 97.5 F

## 2025-03-31 DIAGNOSIS — G43.009 MIGRAINE WITHOUT AURA AND WITHOUT STATUS MIGRAINOSUS, NOT INTRACTABLE: ICD-10-CM

## 2025-03-31 DIAGNOSIS — I10 PRIMARY HYPERTENSION: Primary | ICD-10-CM

## 2025-03-31 PROCEDURE — 99214 OFFICE O/P EST MOD 30 MIN: CPT | Performed by: NURSE PRACTITIONER

## 2025-03-31 RX ORDER — ATOGEPANT 60 MG/1
60 TABLET ORAL DAILY
Qty: 30 TABLET | Refills: 5 | Status: SHIPPED | OUTPATIENT
Start: 2025-03-31

## 2025-03-31 NOTE — PROGRESS NOTES
Patient Name: Elicia Arndt  : 1995   MRN: [id number]     Chief Complaint:    Chief Complaint   Patient presents with    migraines     Follow up       History of Present Illness: Elicia Arndt is a 30 y.o. female.  History of Present Illness  The patient presents for evaluation of headaches, blood pressure management, and cyst.    Headaches  - She reports a significant improvement in her headache symptoms, with no episodes recorded in the past month.  - She has been adhering to her Qulipta regimen and has not required the use of Ubrelvy.    Blood Pressure Management  - Her blood pressure readings have been within normal limits.  - She is on losartan 50 mg, Bystolic 5 mg daily, and hydrochlorothiazide 25 mg daily.  - She does not experience any chest pain or shortness of breath.    Cyst  - She was referred to dermatology for a cyst, which was treated with steroids and has since resolved.    Supplemental information: Her sleep doctor added Provigil, which has helped her.    MEDICATIONS  Qulipta, Ubrelvy, losartan, Bystolic, hydrochlorothiazide, Provigil       Subjective     Review of System: Review of Systems     Medications:     Current Outpatient Medications:     Atogepant (Qulipta) 60 MG tablet, Take 1 tablet by mouth Daily., Disp: 30 tablet, Rfl: 5    buPROPion XL (WELLBUTRIN XL) 150 MG 24 hr tablet, Take 3 tablets by mouth Every Morning., Disp: 90 tablet, Rfl: 0    Cariprazine HCl (VRAYLAR) 4.5 MG capsule capsule, Take 1 capsule by mouth Daily., Disp: 30 capsule, Rfl: 0    hydroCHLOROthiazide 25 MG tablet, TAKE 1 TABLET BY MOUTH DAILY, Disp: 90 tablet, Rfl: 1    losartan (COZAAR) 50 MG tablet, TAKE 1 TABLET BY MOUTH DAILY, Disp: 90 tablet, Rfl: 1    modafinil (PROVIGIL) 100 MG tablet, Take 1 tablet by mouth Daily., Disp: 30 tablet, Rfl: 1    nebivolol (BYSTOLIC) 5 MG tablet, TAKE 1 TABLET BY MOUTH DAILY, Disp: 30 tablet, Rfl: 3    sertraline (ZOLOFT) 50 MG tablet, Take 1 tablet by mouth Daily.,  "Disp: 30 tablet, Rfl: 0    ubrogepant (Ubrelvy) 100 MG tablet, Take one tablet with onset of headache; if symptoms persist or return, may repeat dose after >=2 hours., Disp: 9 tablet, Rfl: 0    Allergies:   Allergies   Allergen Reactions    Adhesive Tape Hives     Red and hives         Objective     Physical Exam:   Vital Signs:   Vitals:    03/31/25 1005   BP: 112/78   BP Location: Right arm   Patient Position: Sitting   Cuff Size: Adult   Pulse: 92   Resp: 12   Temp: 97.5 °F (36.4 °C)   TempSrc: Infrared   Weight: 70.4 kg (155 lb 3.2 oz)   Height: 162.6 cm (64\")   PainSc: 0-No pain           Physical Exam  Physical Exam  Lungs are clear.  Heart rate is regular.      Vital Signs  Blood pressure is normal today.       Results       Assessment / Plan      Assessment/Plan:   Diagnoses and all orders for this visit:    1. Primary hypertension (Primary)    2. Migraine without aura and without status migrainosus, not intractable  -     Atogepant (Qulipta) 60 MG tablet; Take 1 tablet by mouth Daily.  Dispense: 30 tablet; Refill: 5       Assessment & Plan  1. Headaches  - Headaches significantly improved with Qulipta, no headache days reported this month  - Continue current regimen of Qulipta  - Refills for Qulipta provided  - Ubrelvy to be used as needed for breakthrough headaches    2. Blood pressure management  - Blood pressure well-controlled on current medication regimen  - Continue with losartan 50 mg, Bystolic 5 mg daily, and hydrochlorothiazide 25 mg daily    Follow-up  - Patient to follow up in 3 months for annual visit and yearly labs      Questions and concerns addressed at this appointment. Patient verbalized an understanding and agreement with plan of care.      Follow Up:   Return in about 3 months (around 6/30/2025) for Annual.  Patient or patient representative verbalized consent for the use of Ambient Listening during the visit with  ECTOR Alvarado for chart documentation. 3/31/2025  19:18 " SHY Venegas, ECTOR  [facility]

## 2025-04-03 ENCOUNTER — TELEMEDICINE (OUTPATIENT)
Dept: PSYCHIATRY | Facility: CLINIC | Age: 30
End: 2025-04-03
Payer: COMMERCIAL

## 2025-04-03 DIAGNOSIS — F39 UNSPECIFIED MOOD (AFFECTIVE) DISORDER: Primary | Chronic | ICD-10-CM

## 2025-04-03 DIAGNOSIS — F41.1 GENERALIZED ANXIETY DISORDER: Chronic | ICD-10-CM

## 2025-04-03 RX ORDER — SERTRALINE HYDROCHLORIDE 100 MG/1
100 TABLET, FILM COATED ORAL DAILY
Qty: 30 TABLET | Refills: 0 | Status: SHIPPED | OUTPATIENT
Start: 2025-04-03

## 2025-04-03 RX ORDER — BUPROPION HYDROCHLORIDE 150 MG/1
450 TABLET ORAL EVERY MORNING
Qty: 90 TABLET | Refills: 0 | Status: SHIPPED | OUTPATIENT
Start: 2025-04-03

## 2025-04-03 NOTE — PROGRESS NOTES
This provider is located at home office working remotely through the [facility] (through [facility]), [address], [place], [address] using a secure [employer] Video Visit through [employer]. Patient is being seen remotely via telehealth at their home address in [place], and stated they are in a secure environment for this session. The patient's condition being diagnosed/treated is appropriate for telemedicine. The provider identified herself as well as her credentials. The patient, and/or patients guardian, consent to be seen remotely, and when consent is given they understand that the consent allows for patient identifiable information to be sent to a third party as needed. They may refuse to be seen remotely at any time. The electronic data is encrypted and password protected, and the patient and/or guardian has been advised of the potential risks to privacy not withstanding such measures.    You have chosen to receive care through a telehealth visit.  Do you consent to use a video/audio connection for your medical care today? Yes     Patient identifiers utilized: Name and date of birth.    Patient verbally confirmed consent for today's encounter  04/03/2025 .    The patient does verbally confirm they are being seen today while physically located in the [facility].  This provider/this APRN is licensed in the [facility] where the patient is located/being seen.           Subjective   Elicia Arndt is a 30 y.o. female who is here today for medication management follow up.    Chief Complaint:  Depression, anxiety, insomnia     History of Present Illness:  -Last visit with this APRN: 03/06/2025  -Since last encounter with this APRN/Office: The patient states that overall things have been going well for her over the past few weeks.  She states that she has noticed some mild improvements in symptoms with beginning increased dose of Zoloft.  She states that she  "has tolerated the medication well and denies any side effects with this.  She states that despite the mild improvements she has noticed with this medication, she states that symptoms are still not at goal and that ideally she would like to see symptoms become more well-controlled.  -Mood reported as: \"a little better\"  -Patient rates symptoms of depression at a 6/10 on a 0-10 scale, with 10 being the worst.   -Patient rates symptoms of anxiety at a 3/10 on a 0-10 scale, with 10 being the worst.    -Appetite reported as: \"good\"  -Sleep reported as: \"okay\".  Reports she is continuing to have difficulty with daytime fatigue and reports sleeping/napping frequently throughout the day despite being started on Provigil by sleep medicine.    -Changes in medications or new medical problems/concerns since last visit: Denies  -Reported medication compliance: The patient reports compliance with their current psychotropic medication regimen.    -Reported medication side effects or concerns: Denies    -Auditory or visual hallucinations: Denies  -Behaviors different from patient baseline, or any reckless, impulsive, or risky behaviors: Denies  -Symptoms of more or psychosis: Denies  -Self-injurious behavior: Denies  -SI/HI: The patient adamantly denies any suicidal or homicidal ideations, plans, or intent at the time of this encounter and is convincing.  -No abnormal muscle movements or tics noted by this APRN during today's encounter, and the patient denies any of these symptoms.      -Using a shared decision-making approach the patient reports she would be interested in increasing the dose of Zoloft in effort to obtain better management of symptoms at this time.    -The patient does verbally contract for safety at today's encounter and is in verbal agreement with the safety/crisis plan. The patient reports in their own words that they will reach out to this APRN/office prior to next scheduled appointment if there is any " worsening of mood, any new psychiatric symptoms, any medication side effects or concerns, any concern for safety to self or others, any suicidal or homicidal ideations plans or intent, or any concerns, or they will call 911, call or text the suicide and crisis lifeline at [phone], or go to the closest emergency department.                Last Menstrual Period:  Reports she doesn't have regular menstrual cycles due to her Nexplanon.    The patient was educated that her prescribed medications can have potential risk to a developing fetus. The patient is advised to contact this APRN/this office if she becomes pregnant or plans to become pregnant.  Pt verbalizes understanding and acknowledged agreement with this plan in her own words.       Prior Psychiatric Medications:  Effexor - reports ineffective      The following portions of the patient's history were reviewed and updated as appropriate: allergies, current medications, past family history, past medical history, past social history, past surgical history, and problem list.    Review of Systems   Constitutional:  Negative for activity change, appetite change, fatigue and unexpected weight change.   Psychiatric/Behavioral:  Positive for dysphoric mood and hallucinations (Reports daily AVH, but states that this has been her baseline for over 15 years now.  Denies any recent deviation from baseline or any other symptoms of psychosis.). Negative for agitation, behavioral problems, confusion, decreased concentration, self-injury, sleep disturbance and suicidal ideas. The patient is nervous/anxious. The patient is not hyperactive.        Objective   Physical Exam  Constitutional:       Appearance: Normal appearance.   Neurological:      Mental Status: She is alert.   Psychiatric:         Attention and Perception: Attention normal. She perceives auditory (Reports daily AVH, but states that this has been her baseline for over 15 years now.  Denies any recent deviation from  baseline or any other symptoms of psychosis.) and visual (Reports daily AVH, but states that this has been her baseline for over 15 years now.  Denies any recent deviation from baseline or any other symptoms of psychosis.) hallucinations.         Mood and Affect: Affect normal. Mood is depressed.         Speech: Speech normal.         Behavior: Behavior normal. Behavior is cooperative.         Thought Content: Thought content normal. Thought content is not paranoid or delusional. Thought content does not include homicidal or suicidal ideation. Thought content does not include homicidal or suicidal plan.         Cognition and Memory: Cognition and memory normal.         Judgment: Judgment normal.       not currently breastfeeding.    The patient was seen remotely today via a MyChart Video Visit through [employer].  Unable to obtain vital signs due to nature of remote visit.  Height stated at 64 inches.  Weight stated at 155 pounds.     Due to the nature of virtual visits and inability to monitor vital signs and weight with virtual visits, the patient has been encouraged to monitor their vital signs and weight regularly either through self-monitoring via home device(s) or with their Primary Care Provider, and the patient has been instructed to notify this APRN of any abnormalities or significant changes from baseline.       Allergies   Allergen Reactions    Adhesive Tape Hives     Red and hives         No results found for this or any previous visit (from the past 12 weeks).    Current Medications:   Current Outpatient Medications   Medication Sig Dispense Refill    Atogepant (Qulipta) 60 MG tablet Take 1 tablet by mouth Daily. 30 tablet 5    buPROPion XL (WELLBUTRIN XL) 150 MG 24 hr tablet Take 3 tablets by mouth Every Morning. 90 tablet 0    Cariprazine HCl (VRAYLAR) 4.5 MG capsule capsule Take 1 capsule by mouth Daily. 30 capsule 0    hydroCHLOROthiazide 25 MG tablet TAKE 1 TABLET BY MOUTH DAILY 90 tablet 1    losartan  (COZAAR) 50 MG tablet TAKE 1 TABLET BY MOUTH DAILY 90 tablet 1    modafinil (PROVIGIL) 100 MG tablet Take 1 tablet by mouth Daily. 30 tablet 1    nebivolol (BYSTOLIC) 5 MG tablet TAKE 1 TABLET BY MOUTH DAILY 30 tablet 3    sertraline (ZOLOFT) 100 MG tablet Take 1 tablet by mouth Daily. 30 tablet 0    ubrogepant (Ubrelvy) 100 MG tablet Take one tablet with onset of headache; if symptoms persist or return, may repeat dose after >=2 hours. 9 tablet 0     No current facility-administered medications for this visit.       Mental Status Exam:   Hygiene:   good  Cooperation:  Cooperative  Eye Contact:  Good  Psychomotor Behavior:  Appropriate  Affect:  Full range  Hopelessness: Denies  Speech:  Normal  Thought Process:  Goal directed and Linear  Thought Content:  Normal  Suicidal:  None  Homicidal:  None  Hallucinations:   Reports daily AVH, but states that this has been her baseline for over 15 years now.  Denies any recent deviation from baseline or any other symptoms of psychosis.  Delusion:  None  Memory:  Intact  Orientation:  Person, Place, Time, and Situation  Reliability:  good  Insight:  Good  Judgement:  Good  Impulse Control:  Good  Physical/Medical Issues:  Yes see medical history        PHQ-9 Depression Screening  Little interest or pleasure in doing things? (Patient-Rptd) Almost all   Feeling down, depressed, or hopeless? (Patient-Rptd) Several days   PHQ-2 Total Score (Patient-Rptd) 4   Trouble falling or staying asleep, or sleeping too much? (Patient-Rptd) Several days   Feeling tired or having little energy? (Patient-Rptd) Almost all   Poor appetite or overeating? (Patient-Rptd) Several days   Feeling bad about yourself - or that you are a failure or have let yourself or your family down? (Patient-Rptd) Not at all   Trouble concentrating on things, such as reading the newspaper or watching television? (Patient-Rptd) Several days   Moving or speaking so slowly that other people could have noticed? Or the  opposite - being so fidgety or restless that you have been moving around a lot more than usual? (Patient-Rptd) Several days   Thoughts that you would be better off dead, or of hurting yourself in some way? (Patient-Rptd) Not at all   PHQ-9 Total Score (Patient-Rptd) 11   If you checked off any problems, how difficult have these problems made it for you to do your work, take care of things at home, or get along with other people? (Patient-Rptd) Extremely difficult           DREW-7  Feeling nervous, anxious or on edge: (Patient-Rptd) Several days  Not being able to stop or control worrying: (Patient-Rptd) Several days  Worrying too much about different things: (Patient-Rptd) Several days  Trouble Relaxing: (Patient-Rptd) Several days  Being so restless that it is hard to sit still: (Patient-Rptd) Nearly every day  Feeling afraid as if something awful might happen: (Patient-Rptd) Not at all  Becoming easily annoyed or irritable: (Patient-Rptd) Nearly every day  DREW 7 Total Score: (Patient-Rptd) 10  If you checked any problems, how difficult have these problems made it for you to do your work, take care of things at home, or get along with other people: (Patient-Rptd) Extremely difficult          Assessment & Plan   Diagnoses and all orders for this visit:    1. Unspecified mood (affective) disorder (Primary)  -     buPROPion XL (WELLBUTRIN XL) 150 MG 24 hr tablet; Take 3 tablets by mouth Every Morning.  Dispense: 90 tablet; Refill: 0  -     Cariprazine HCl (VRAYLAR) 4.5 MG capsule capsule; Take 1 capsule by mouth Daily.  Dispense: 30 capsule; Refill: 0  -     sertraline (ZOLOFT) 100 MG tablet; Take 1 tablet by mouth Daily.  Dispense: 30 tablet; Refill: 0    2. Generalized anxiety disorder  -     buPROPion XL (WELLBUTRIN XL) 150 MG 24 hr tablet; Take 3 tablets by mouth Every Morning.  Dispense: 90 tablet; Refill: 0  -     sertraline (ZOLOFT) 100 MG tablet; Take 1 tablet by mouth Daily.  Dispense: 30 tablet; Refill:  0                  Visit Diagnoses:    ICD-10-CM ICD-9-CM   1. Unspecified mood (affective) disorder  F39 296.90   2. Generalized anxiety disorder  F41.1 300.02             GOALS:  Short Term Goals: Patient will be compliant with medication, and patient will have no significant medication related side effects.  Patient will be engaged in psychotherapy as indicated.  Patient will report subjective improvement of symptoms.  Long term goals: To stabilize mood and treat/improve subjective symptoms, the patient will stay out of the hospital, the patient will be at an optimal level of functioning, and the patient will take all medications as prescribed.  The patient verbalized understanding and agreement with goals that were mutually set.      SUICIDE RISK ASSESSMENT: Unalterable demographics and a history of mental health intervention indicate this patient is in a high risk category compared to the general population. At present, the patient denies active SI/HI, intentions, or plans at this time and agrees to seek immediate care should such thoughts develop. The patient verbalizes understanding of how to access emergency care if needed and agrees to do so. Consideration of suicide risk and protective factors such as history, current presentation, individual strengths and weaknesses, psychosocial and environmental stressors and variables, psychiatric illness and symptoms, medical conditions and pain, took place in this interview. Based on those considerations, the patient is determined: within individual baseline and presenting no imminent risk for suicide or homicide. Other recommendations: The patient does not meet the criteria for inpatient admission and is not a safety risk to self or others at today's visit. Inpatient treatment offers no significant advantages over outpatient treatment for this patient at today's visit.      SAFETY PLAN:  Patient was given ample time for questions and fully participated in treatment  planning.  Patient was encouraged to call the clinic with any questions or concerns.  Patient was informed of access to emergency care. If patient were to develop any significant symptomatology, suicidal ideation, homicidal ideation, any concerns, or feel unsafe at any time they are to call the clinic and if unable to get immediate assistance should immediately call 911 or go to the nearest emergency room.  The patient is advised to remove or secure (lock away) all lethal weapons (including guns) and sharps (including razors, scissors, knives, etc.).  All medications (including any prescribed and any over the counter medications) should be stored in a safe and secured location that is not obtainable by children/adolescents.  Patient was given an opportunity and encouraged to ask questions about their medication, illness, and treatment. Patient contracted verbally for the following: If you are experiencing an emotional crisis or have thoughts of harming yourself or others, please go to your nearest local emergency room or call 911. Will continue to re-assess medication response and side effects frequently to establish efficacy and ensure safety. Risks, any black box warnings, side effects, off label usage, and benefits of medication and treatment discussed with patient, along with potential adverse side effects of current and/or newly prescribed medication, alternative treatment options, and OTC medications.  Patient verbalized understanding of potential risks, any off label use of medication, any black box warnings, and any side effects in their own words. The patient verbalized understanding and agreed to comply with the safety plan discussed in their own words.  Patient given the number to the office. Number also available to the 24- hour suicide hotline.       TREATMENT PLAN/GOALS: Continue medications and treatment plan as indicated. Treatment and medication options discussed during today's visit. Patient  acknowledged and verbally consented to continue with current treatment plan and was educated on the importance of compliance with treatment and follow-up appointments.        -Increase Zoloft to 100 mg daily for mood/anxiety  -Continue Wellbutrin  mg daily for mood/anxiety  -Continue Vraylar 4.5 mg daily as adjunct for mood/anxiety  -Continue psychotherapy with Chas Ramirez LCSW  -Rule out psychotic disorder potentially contributing to reported hallucinations        MEDICATION ISSUES: Discussed medication options and treatment plan of prescribed medication, any off label use of medication, as well as the risks, benefits, any black box warnings including increased suicidality, and side effects including but not limited to potential falls, dizziness, possible impaired driving, GI side effects (change in appetite, abdominal discomfort, nausea, vomiting, diarrhea, and/or constipation), dry mouth, somnolence, sedation, insomnia, activation, agitation, irritation, tremors, abnormal muscle movements or disorders, tardive dyskinesia, akathisia, asthenia, headache, sweating, possible bruising or rare bleeding, electrolyte and/or fluid abnormalities, change in blood pressure/heart rate/and or heart rhythm, hypotension, sexual dysfunction, rare impulse control problems, rare seizures, rare neuroleptic malignant syndrome, increased risk of death and cerebrovascular events, change in blood glucose and increased risk for diabetes, change in triglycerides and cholesterol and increased risk for dyslipidemia,  weight gain, weight gain that can become problematic to health, skin conditions and reactions, and metabolic adversities among others. Patient and/or guardian are agreeable to call the office with any worsening of symptoms or onset of side effects, or if any concerns or questions arise.  The contact information for the office is made available to the patient and/or guardian. Patient and/or guardian are agreeable to  call 911 or go to the nearest ER should they begin having any SI/HI, or if any urgent concerns arise. No medication side effects or related complaints today.    This APRN has discussed with the patient/guardian about the possibility of serotonin syndrome when certain medications are taken together, as is the case with this patient.  This APRN has provided the patient/guardian with a list of symptoms of serotonin syndrome including symptoms of autonomic instability, altered sensorium, confusion, restlessness, agitation, myoclonus, hyperreflexia, hyperthermia, diaphoresis, tremor, chills, diarrhea and cramps, ataxia, headache, migraines, seizures, and insomnia; which could lead to permanent hyperthermic brain damage, cardiovascular collapse, coma, or even death.  The patient/guardian are instructed to stop medications immediately and either contact this APRN/this office during regular office hours, or go to the emergency department/call 911, if they begin to experience any of the symptoms discussed.  The benefits and risks of the current medication regimen are discussed with the patient/guardian, and they feel that the benefits out weigh the risks.  The patient/guardian verbalized understanding and agreement in their own words.              VERBAL INFORMED CONSENT FOR MEDICATION:  The patient was educated that their proposed/prescribed psychotropic medication(s) has potential risks, side effects, adverse effects, and black box warnings; and these have been discussed with the patient.  The patient has been informed that their treatment and medication dosage is to be individualized, and may even be above or below the recommended range/dosage due to patient individualization and response, but medication is prescribed using a shared decision making approach, and no medication or dosage will be prescribed without the patient's verbal consent.  The reason for the use of the medication including any off label use and  alternative modes of treatment other than or in addition to medication has been considered and discussed, the probable consequences of not receiving the proposed treatment have been discussed, and any treatment side effects, black box warnings, and cautions associated with treatment have been discussed with the patient.  The patient is allowed ample time to openly discuss and ask questions regarding the proposed medication(s) and treatment plan and the patient verbalizes understanding the reasons for the use of the medication, its potential risks and benefits, other alternative treatment(s), and the probable consequences that may occur if the proposed medication is not given.  The patient has been given ample time to ask questions and study the information and find the information to be specific, accurate, and complete.  The patient gives verbal consent for the medication(s) proposed/prescribed, they verbalized understanding that they can refuse and withdraw consent at any time with the assistance of this APRN, and the patient has verbally confirmed that they are aware, and are willing, to take the prescribed medication and follow the treatment plan with the known possible risks, side effect, black box warnings, and any potential medication interactions, and the patient reports they will be worse off without this medication and treatment plan.  The patient is advised to contact this APRN/this office if any questions or concerns arise at any time (at [phone]), or call 911/go to the closest emergency department if needed or outside of office hours.       [facility] No Show Policy:  We understand unexpected circumstances arise; however, anytime you miss your appointment we are unable to provide you appropriate care.  In addition, each appointment missed could have been used to provide care for others.  We ask that you call at least 24 hours in advance to cancel or reschedule an appointment.   We would like to take this opportunity to remind you of our policy stating patients who miss THREE or more appointments without cancelling or rescheduling 24 hours in advance of the appointment may be subject to cancellation of any further visits with our clinic and recommendation to seek in-person services/visits.    Please call [phone] to reschedule your appointment. If there are reasons that make it difficult for you to keep the appointments, please call and let us know how we can help.  Please understand that medication prescribing will not continue without seeing your provider.      [facility] No Show Policy reviewed with patient at today's visit. Patient verbalized understanding of this policy. Discussed with patient that in the event that there are three or more no show visits, it will be recommended that they pursue in-person services/visits as noncompliance with telehealth visits indicates that patient is not an appropriate candidate for telemedicine and would likely be more appropriate for in-person services/visits. Patient verbalizes understanding and is agreeable to this.           MEDS ORDERED DURING VISIT:  New Medications Ordered This Visit   Medications    buPROPion XL (WELLBUTRIN XL) 150 MG 24 hr tablet     Sig: Take 3 tablets by mouth Every Morning.     Dispense:  90 tablet     Refill:  0    Cariprazine HCl (VRAYLAR) 4.5 MG capsule capsule     Sig: Take 1 capsule by mouth Daily.     Dispense:  30 capsule     Refill:  0    sertraline (ZOLOFT) 100 MG tablet     Sig: Take 1 tablet by mouth Daily.     Dispense:  30 tablet     Refill:  0       Return in about 4 weeks (around 5/1/2025), or if symptoms worsen or fail to improve, for Recheck.       The patient will follow-up in approximately 4 weeks, and follow-up appointment has been scheduled with the patient confirming agreeability/availability for date/time during today's encounter.  Instructed the patient to contact the  [facility] if they have any questions or concerns, and that a sooner appointment will be provided if needed.  The patient verbalized understanding in their own words and endorsed that they are agreeable to this.        Functional Status: Moderate impairment     Prognosis: Good with Ongoing Treatment             This document has been electronically signed by ECTOR Gerardo  April 3, 2025 11:13 EDT      Some of the data in this electronic note has been brought forward from a previous encounter, any necessary changes have been made, it has been reviewed by this APRN, and it is accurate.       Part of this note may be an electronic transcription/translation of spoken language to printed text using the Dragon Dictation System.

## 2025-04-11 ENCOUNTER — TELEMEDICINE (OUTPATIENT)
Dept: PSYCHIATRY | Facility: CLINIC | Age: 30
End: 2025-04-11
Payer: COMMERCIAL

## 2025-04-11 DIAGNOSIS — F41.1 GENERALIZED ANXIETY DISORDER: Primary | ICD-10-CM

## 2025-04-11 DIAGNOSIS — F43.10 POST TRAUMATIC STRESS DISORDER (PTSD): ICD-10-CM

## 2025-04-11 DIAGNOSIS — F33.1 MAJOR DEPRESSIVE DISORDER, RECURRENT EPISODE, MODERATE: ICD-10-CM
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
## 2025-04-30 DIAGNOSIS — F41.1 GENERALIZED ANXIETY DISORDER: Chronic | ICD-10-CM

## 2025-04-30 DIAGNOSIS — F39 UNSPECIFIED MOOD (AFFECTIVE) DISORDER: Chronic | ICD-10-CM

## 2025-04-30 RX ORDER — CARIPRAZINE 4.5 MG/1
4.5 CAPSULE, GELATIN COATED ORAL DAILY
Qty: 30 CAPSULE | Refills: 0 | Status: SHIPPED | OUTPATIENT
Start: 2025-04-30

## 2025-04-30 RX ORDER — SERTRALINE HYDROCHLORIDE 100 MG/1
100 TABLET, FILM COATED ORAL DAILY
Qty: 30 TABLET | Refills: 0 | Status: SHIPPED | OUTPATIENT
Start: 2025-04-30

## 2025-05-06 ENCOUNTER — OFFICE VISIT (OUTPATIENT)
Dept: INTERNAL MEDICINE | Facility: CLINIC | Age: 30
End: 2025-05-06
Payer: COMMERCIAL

## 2025-05-06 VITALS
SYSTOLIC BLOOD PRESSURE: 104 MMHG | RESPIRATION RATE: 12 BRPM | WEIGHT: 151.6 LBS | BODY MASS INDEX: 25.88 KG/M2 | HEIGHT: 64 IN | TEMPERATURE: 98.2 F | HEART RATE: 80 BPM | DIASTOLIC BLOOD PRESSURE: 72 MMHG

## 2025-05-06 DIAGNOSIS — N30.00 ACUTE CYSTITIS WITHOUT HEMATURIA: Primary | ICD-10-CM

## 2025-05-06 DIAGNOSIS — R82.998 LEUKOCYTES IN URINE: ICD-10-CM

## 2025-05-06 DIAGNOSIS — R30.9 PAINFUL URINATION: ICD-10-CM

## 2025-05-06 LAB
BILIRUB BLD-MCNC: NEGATIVE MG/DL
CLARITY, POC: CLEAR
COLOR UR: YELLOW
EXPIRATION DATE: ABNORMAL
GLUCOSE UR STRIP-MCNC: NEGATIVE MG/DL
KETONES UR QL: NEGATIVE
LEUKOCYTE EST, POC: ABNORMAL
Lab: ABNORMAL
NITRITE UR-MCNC: NEGATIVE MG/ML
PH UR: 7 [PH] (ref 5–8)
PROT UR STRIP-MCNC: NEGATIVE MG/DL
RBC # UR STRIP: NEGATIVE /UL
SP GR UR: 1.01 (ref 1–1.03)
UROBILINOGEN UR QL: NORMAL

## 2025-05-06 PROCEDURE — 99213 OFFICE O/P EST LOW 20 MIN: CPT | Performed by: NURSE PRACTITIONER

## 2025-05-06 PROCEDURE — 87077 CULTURE AEROBIC IDENTIFY: CPT | Performed by: NURSE PRACTITIONER

## 2025-05-06 PROCEDURE — 87086 URINE CULTURE/COLONY COUNT: CPT | Performed by: NURSE PRACTITIONER

## 2025-05-06 PROCEDURE — 87186 SC STD MICRODIL/AGAR DIL: CPT | Performed by: NURSE PRACTITIONER

## 2025-05-06 PROCEDURE — 81003 URINALYSIS AUTO W/O SCOPE: CPT | Performed by: NURSE PRACTITIONER

## 2025-05-06 RX ORDER — PHENAZOPYRIDINE HYDROCHLORIDE 200 MG/1
200 TABLET, FILM COATED ORAL 3 TIMES DAILY PRN
Qty: 6 TABLET | Refills: 0 | Status: SHIPPED | OUTPATIENT
Start: 2025-05-06

## 2025-05-06 RX ORDER — CARIPRAZINE 3 MG/1
3 CAPSULE, GELATIN COATED ORAL DAILY
COMMUNITY
Start: 2025-04-15

## 2025-05-06 RX ORDER — NITROFURANTOIN 25; 75 MG/1; MG/1
100 CAPSULE ORAL 2 TIMES DAILY
Qty: 14 CAPSULE | Refills: 0 | Status: SHIPPED | OUTPATIENT
Start: 2025-05-06

## 2025-05-06 NOTE — PROGRESS NOTES
Patient Name: Elicia Arndt  : 1995   MRN: [id number]     Chief Complaint:    Chief Complaint   Patient presents with    Difficulty Urinating       History of Present Illness: Elicia Arndt is a 30 y.o. female.  History of Present Illness  The patient presents for evaluation of a suspected urinary tract infection (UTI).    Dysuria  - The patient reports experiencing dysuria, which began a few days prior to her visit.  - There have been no associated fevers, chills, back pain, flank pain, nausea, vomiting, diarrhea, or abdominal pain.    Spotting and Vaginal Discharge  - She mentions spotting and vaginal discharge but does not find these symptoms concerning.    Supplemental information: She has not taken any recent antibiotics and has no known allergies.         Subjective     Review of System: Review of Systems     Medications:     Current Outpatient Medications:     Atogepant (Qulipta) 60 MG tablet, Take 1 tablet by mouth Daily., Disp: 30 tablet, Rfl: 5    hydroCHLOROthiazide 25 MG tablet, TAKE 1 TABLET BY MOUTH DAILY, Disp: 90 tablet, Rfl: 1    losartan (COZAAR) 50 MG tablet, TAKE 1 TABLET BY MOUTH DAILY, Disp: 90 tablet, Rfl: 1    modafinil (PROVIGIL) 100 MG tablet, Take 1 tablet by mouth Daily., Disp: 30 tablet, Rfl: 1    nebivolol (BYSTOLIC) 5 MG tablet, TAKE 1 TABLET BY MOUTH DAILY, Disp: 30 tablet, Rfl: 3    sertraline (ZOLOFT) 100 MG tablet, TAKE 1 TABLET BY MOUTH DAILY, Disp: 30 tablet, Rfl: 0    ubrogepant (Ubrelvy) 100 MG tablet, Take one tablet with onset of headache; if symptoms persist or return, may repeat dose after >=2 hours., Disp: 9 tablet, Rfl: 0    Vraylar 3 MG capsule capsule, Take 1 capsule by mouth Daily., Disp: , Rfl:     nitrofurantoin, macrocrystal-monohydrate, (MACROBID) 100 MG capsule, Take 1 capsule by mouth 2 (Two) Times a Day., Disp: 14 capsule, Rfl: 0    phenazopyridine (Pyridium) 200 MG tablet, Take 1 tablet by mouth 3 (Three) Times a Day As Needed for Bladder  "Spasms., Disp: 6 tablet, Rfl: 0    Allergies:   Allergies   Allergen Reactions    Adhesive Tape Hives     Red and hives         Objective     Physical Exam:   Vital Signs:   Vitals:    05/06/25 1348   BP: 104/72   BP Location: Right arm   Patient Position: Sitting   Cuff Size: Adult   Pulse: 80   Resp: 12   Temp: 98.2 °F (36.8 °C)   TempSrc: Infrared   Weight: 68.8 kg (151 lb 9.6 oz)   Height: 162.6 cm (64\")   PainSc: 5            Physical Exam  Physical Exam  General: Patient appears in discomfort  Respiratory: Clear to auscultation, no wheezing, rales or rhonchi  Cardiovascular: Regular rate and rhythm, no murmurs, rubs, or gallops  Gastrointestinal: Soft, no tenderness, no distention, no masses  Genitourinary: No CVA tenderness       Results  Labs   - Urine test: A few leukocytes     Assessment / Plan      Assessment/Plan:   Diagnoses and all orders for this visit:    1. Acute cystitis without hematuria (Primary)  -     nitrofurantoin, macrocrystal-monohydrate, (MACROBID) 100 MG capsule; Take 1 capsule by mouth 2 (Two) Times a Day.  Dispense: 14 capsule; Refill: 0  -     phenazopyridine (Pyridium) 200 MG tablet; Take 1 tablet by mouth 3 (Three) Times a Day As Needed for Bladder Spasms.  Dispense: 6 tablet; Refill: 0    2. Painful urination  -     POC Urinalysis Dipstick, Automated    3. Leukocytes in urine  -     Urine Culture - Urine, Urine, Clean Catch       Assessment & Plan  1. Urinary Tract Infection (UTI)  - Reports symptoms of burning during urination and has a few leukocytes in her urine, indicating a possible infection  - Physical examination reveals no CVA tenderness; abdomen is soft and nontender  - A urine culture will be sent for further analysis  - Nitrofurantoin prescribed to treat the UTI  - Pyridium provided for urinary pain relief, to be taken three times daily for two days  - Advised to drink plenty of fluids  - Should start feeling better within 1 to 2 days  - If no improvement by Thursday, " contact the office    2. Vaginal Discharge  - Reports vaginal discharge but does not find it concerning at this time  - No additional physical examination findings related to vaginal discharge were discussed  - If no improvement with the antibiotic or if the culture is negative, a swab may be considered to rule out a vaginal infection  - No immediate treatment for vaginal discharge is planned unless symptoms persist or worsen       Explained and discussed patient's condition and plan of care including labs and radiology that may be ordered.  Discussed when to follow-up.  Discussed possible red flags and how to follow-up with those.  Viewed patient's medications and discussed common side effects and symptoms to report. Patient to continue current medications as advised.  Be compliant with medications. Patient to call clinic if they have any worsening, no improvement, does not tolerate medication, or any future concerns about treatment.  Questions and concerns addressed at this appointment.  Patient to report to ER for emergencies.  Patient verbalized an understanding and agreement with plan of care.      Follow Up:   Return for Next scheduled follow up.  Patient or patient representative verbalized consent for the use of Ambient Listening during the visit with  ECTOR Alvarado for chart documentation. 5/6/2025  17:52 EDT    ECTOR Alvarado  [facility]

## 2025-05-08 LAB — BACTERIA SPEC AEROBE CULT: ABNORMAL

## 2025-05-16 ENCOUNTER — TELEMEDICINE (OUTPATIENT)
Dept: PSYCHIATRY | Facility: CLINIC | Age: 30
End: 2025-05-16
Payer: COMMERCIAL

## 2025-05-16 DIAGNOSIS — F33.1 MAJOR DEPRESSIVE DISORDER, RECURRENT EPISODE, MODERATE: ICD-10-CM

## 2025-05-16 DIAGNOSIS — F41.1 GENERALIZED ANXIETY DISORDER: Primary | ICD-10-CM

## 2025-05-16 DIAGNOSIS — F43.10 POST TRAUMATIC STRESS DISORDER (PTSD): ICD-10-CM

## 2025-05-16 NOTE — PLAN OF CARE
Patient was engaged in reviewing treatment goals. Patient expressed that she does want to continue working to build skills to manage school stress. 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 Patient will also engaged in routines of self care to support a stable mood.     Problem: Anxiety 3  Goal: Patient will develop and implement behavioral and cognitive strategies to reduce anxiety and irrational fears.  Outcome: Progressing     Problem: Depression 2  Goal: Patient will demonstrate the ability to initiate new constructive life skills outside of sessions on a consistent basis.  Outcome: Progressing

## 2025-05-16 NOTE — PROGRESS NOTES
"[facility]   Follow-up Progress Note     Date: May 16, 2025  Time In: 11:00  Time Out: 11:47      PROGRESS NOTE  Data:  Elicia Arndt is a 30 y.o. female presenting to [facility] (through [facility]), [address] [place], [address] using a secure MyChart Video Visit through [facility] for assessment with Chas Ramirez LCSW. The patient is seen remotely in their  home  using [facility] My Chart. Patient is being seen via telehealth and stated they are in a secure environment for this session. The patient's condition being diagnosed/treated is appropriate for telemedicine. The provider identified herself as well as her credentials. The patient and/or patients guardian consent to be seen remotely, and when consent is given they understand that the consent allows for patient identifiable information to be sent to a third party as needed. They may refuse to be seen remotely at any time. The electronic data is encrypted and password protected, and the patient has been advised of the potential risks to privacy not withstanding such measures.    Today Patient expressed she has been doing \"a lot better.\" Patient stated her father had stopped talking to patient but has started to initiate conversations. Patient stated their conversations are short \"and he only asks how I am doing not the girls.\" Discussed with patient how she feels about these interactions with her father. 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 Patient reported she will only have 2 more course to complete before getting this license. Patient expressed it has felt more doable to refocus her goals. Patient is planning to start her final two courses in the fall then began her LPN class. Patient stated she is also adjusting to her daughter starting puberty. Patient expressed she is sad that her \"little girl isn't a little " "girl anymore.\" Patient stated her mother also has decided not to retire yet. Patient stated her mother did mention that her thoughts of retiring were due to complications with being on her foot for extended periods of time. Patient was engaged in reviewing treatment progress.     *Patient was having difficulties with her connection. Patient attempted several times to log back into the appointment but her connection kept getting lost. Provider completed appointment via telephone.       Clinical Maneuvering/Intervention:    Chief Complaint: anxiety, depression, past trauma    (Scales based on 0 - 10 with 10 being the worst)  Depression: 2 Anxiety: 2     Assisted Patient in processing above session content; acknowledged and normalized patient’s thoughts, feelings, and concerns.  Rationalized patient thought process regarding recent life changes.  Discussed triggers associated with patient's anxiety.  Also discussed coping skills for patient to implement such as building self care routines.    Allowed Patient to freely discuss issues  without interruption or judgement with unconditional positive regard, active listening skills, and empathy. Therapist provided a safe, confidential environment to facilitate the development of a positive therapeutic relationship and encouraged open, honest communication. Assisted Patient in identifying risk factors which would indicate the need for higher level of care including thoughts to harm self or others and/or self-harming behavior and encouraged Patient to contact this office, call 911, or present to the nearest emergency room should any of these events occur. Discussed crisis intervention services and means to access. Patient adamantly and convincingly denies current suicidal or homicidal ideation or perceptual disturbance. Assisted Patient in processing session content; acknowledged and normalized Patient’s thoughts, feelings, and concerns by utilizing a person-centered approach " in efforts to build appropriate rapport and a positive therapeutic relationship with open and honest communication. Therapist utilized dialectical behavior techniques to teach and model emotional regulation and relaxation methods. Therapist assisted Patient with identifying and implementing healthier coping strategies.     Assessment   Patient appears to be experiencing heightened anxiety and depression in response to COVID-19 epidemic  As a result, they can be reasonably expected to continue to benefit from treatment and would likely be at increased risk for decompensation otherwise.    Mental Status Exam:   Hygiene:   good  Cooperation:  Cooperative  Eye Contact:  Good  Psychomotor Behavior:  Appropriate  Affect:  Full range  Mood:  happy  Speech:  Normal  Thought Process:  Goal directed  Thought Content:  Mood congruent  Suicidal:  None  Homicidal:  None  Hallucinations:  None  Delusion:  None  Memory:  Intact  Orientation:  Person, Place, Time, and Situation  Reliability:  good  Insight:  Good  Judgement:  Good  Impulse Control:  Good  Physical/Medical Issues:  No      PHQ-Score Total:  PHQ-9 Total Score:        Patient's Support Network Includes:   and mother    Functional Status: Moderate impairment     Progress toward goal: Not at goal    Prognosis: Good with Ongoing Treatment            Impression/Formulation:    VISIT DIAGNOSIS:     ICD-10-CM ICD-9-CM   1. Generalized anxiety disorder  F41.1 300.02   2. Major depressive disorder, recurrent episode, moderate  F33.1 296.32   3. Post traumatic stress disorder (PTSD)  F43.10 309.81        Patient appeared alert and oriented.  Patient is voluntarily requesting to continue outpatient therapy at [facility].  Patient is receptive to assistance with maintaining a stable lifestyle.  Patient presents with history of anxiety, depression, and past trauma.  Patient is agreeable to attend routine therapy sessions.  Patient  expressed desire to maintain stability and participate in the therapeutic process.        Crisis Plan:  Symptoms and/or behaviors to indicate a crisis: Excessive worry or fear and Feeling sad or low    What calming techniques or other strategies will patient use to de-esclate and stay safe: slow down, breathe, visualize calming self, think it though, listen to music, change focus, take a walk    Who is one person patient can contact to assist with de-escalation? mother    If symptoms/behaviors persist, Patient will present to the nearest hospital for an assessment. Advised patient of [facility] ER and assessment services.     Plan:   Patient will continue in individual outpatient therapy with focus on improved functioning and coping skills, maintaining stability, and avoiding decompensation and the need for higher level of care.    Patient will contact this office ([facility] at [phone]), call 911 or present to the nearest emergency room should suicidal or homicidal ideations occur. Provide Cognitive Behavioral Therapy and Solution Focused Therapy to improve functioning, maintain stability, and avoid decompensation and the need for higher level of care.     Return in about 4 weeks, or earlier if symptoms worsen or fail to improve.    Recommended Referrals: none        This document has been electronically signed by Chas Ramirez LCSW  May 16, 2025 11:00 EDT        Part of this note may be an electronic transcription/translation of spoken language to printed text using the Dragon Dictation System.    Mode of Visit: Video  Location of patient: -HOME-  Location of provider: +HOME+  You have chosen to receive care through a telehealth visit.  The patient has signed the video visit consent form.  The visit included audio and video interaction. No technical issues occurred during this visit.

## 2025-05-16 NOTE — TREATMENT PLAN
Multi-Disciplinary Problems (from Behavioral Health Treatment Plan)      Active Problems       Problem: Anxiety  Start Date: 05/16/25      Problem Details: The patient self-scales this problem as a 3 with 10 being the worst. Patient will utilize coping skills to regulate stress related to school.           Goal Priority Start Date Expected End Date End Date    Patient will develop and implement behavioral and cognitive strategies to reduce anxiety and irrational fears. -- 05/16/25 11/14/25 --    Goal Details: Progress toward goal:  The patient self-scales their progress related to this goal as a 3 with 10 being the worst.        Goal Intervention Frequency Start Date End Date    Help patient explore past emotional issues in relation to present anxiety. Q4 Weeks 05/16/25 --    Intervention Details: Duration of treatment until discharged.        Goal Intervention Frequency Start Date End Date    Help patient develop an awareness of their cognitive and physical responses to anxiety. Q4 Weeks 05/16/25 --    Intervention Details: Duration of treatment until discharged.                Problem: Depression  Start Date: 05/16/25      Problem Details: The patient self-scales this problem as a 2 with 10 being the worst. Patient will build routines of self care to support a stable mood.           Goal Priority Start Date Expected End Date End Date    Patient will demonstrate the ability to initiate new constructive life skills outside of sessions on a consistent basis. -- 05/16/25 11/14/25 --    Goal Details: Progress toward goal:  The patient self-scales their progress related to this goal as a 2 with 10 being the worst.        Goal Intervention Frequency Start Date End Date    Assist patient in setting attainable activities of daily living goals. PRN 05/16/25 --      Goal Intervention Frequency Start Date End Date    Provide education about depression Q4 Weeks 05/16/25 --    Intervention Details: Duration of treatment until  discharged.        Goal Intervention Frequency Start Date End Date    Assist patient in developing healthy coping strategies. Q4 Weeks 05/16/25 --    Intervention Details: Duration of treatment until discharged.                        Reviewed By       Chas Ramirez, [facility] 05/16/25 114                     I have discussed and reviewed this treatment plan with the patient.

## 2025-06-01 DIAGNOSIS — F39 UNSPECIFIED MOOD (AFFECTIVE) DISORDER: Chronic | ICD-10-CM

## 2025-06-01 DIAGNOSIS — F41.1 GENERALIZED ANXIETY DISORDER: Chronic | ICD-10-CM

## 2025-06-02 RX ORDER — CARIPRAZINE 4.5 MG/1
4.5 CAPSULE, GELATIN COATED ORAL DAILY
Qty: 30 CAPSULE | Refills: 0 | OUTPATIENT
Start: 2025-06-02

## 2025-06-02 RX ORDER — SERTRALINE HYDROCHLORIDE 100 MG/1
100 TABLET, FILM COATED ORAL DAILY
Qty: 30 TABLET | Refills: 0 | OUTPATIENT
Start: 2025-06-02

## 2025-06-02 NOTE — TELEPHONE ENCOUNTER
Contacted patient she stated that she's seeing a  new provider for the medication. Pt stated to disregard the request

## 2025-06-13 ENCOUNTER — TELEMEDICINE (OUTPATIENT)
Dept: PSYCHIATRY | Facility: CLINIC | Age: 30
End: 2025-06-13
Payer: COMMERCIAL

## 2025-06-13 DIAGNOSIS — F43.10 POST TRAUMATIC STRESS DISORDER (PTSD): ICD-10-CM

## 2025-06-13 DIAGNOSIS — F33.1 MAJOR DEPRESSIVE DISORDER, RECURRENT EPISODE, MODERATE: ICD-10-CM

## 2025-06-13 DIAGNOSIS — F41.1 GENERALIZED ANXIETY DISORDER: Primary | ICD-10-CM

## 2025-06-13 NOTE — PROGRESS NOTES
"[facility]   Follow-up Progress Note     Date: June 13, 2025  Time In: 10:01  Time Out: 10:18      PROGRESS NOTE  Data:  Elicia Arndt is a 30 y.o. female presenting to [facility] (through [facility]), [address] [place], [address] using a secure MyChart Video Visit through [facility] for assessment with Chas Ramirez LCSW. The patient is seen remotely in their home using [facility] My Chart. Patient is being seen via telehealth and stated they are in a secure environment for this session. The patient's condition being diagnosed/treated is appropriate for telemedicine. The provider identified herself as well as her credentials. The patient and/or patients guardian consent to be seen remotely, and when consent is given they understand that the consent allows for patient identifiable information to be sent to a third party as needed. They may refuse to be seen remotely at any time. The electronic data is encrypted and password protected, and the patient has been advised of the potential risks to privacy not withstanding such measures.    Today Patient stated her  recently decided to quit his job. Patient stated she knew he was considering leaving but didn't think it would be this soon. Patient stated he doesn't have any other job opportunities or plans to return to work. Patient stated she feels the stress of having to financially provide for the family. Patient expressed she recognizes \"I can't get mad at the situation.\" Patient recognized she can't control her 's actions \"so why get mad about it.\" Engaged patient in communicating her concerns and discussing expectations of her 's and her own about his break from work.       Clinical Maneuvering/Intervention:    Chief Complaint: anxiety, depression, past trauma    (Scales based on 0 - 10 with 10 being the worst)  Depression: 5 Anxiety: 7 "     Assisted Patient in processing above session content; acknowledged and normalized patient’s thoughts, feelings, and concerns.  Rationalized patient thought process regarding stress due to her  quitting his job.  Discussed triggers associated with patient's anxiety.  Also discussed coping skills for patient to implement such as communicating expectations.    Allowed Patient to freely discuss issues  without interruption or judgement with unconditional positive regard, active listening skills, and empathy. Therapist provided a safe, confidential environment to facilitate the development of a positive therapeutic relationship and encouraged open, honest communication. Assisted Patient in identifying risk factors which would indicate the need for higher level of care including thoughts to harm self or others and/or self-harming behavior and encouraged Patient to contact this office, call 911, or present to the nearest emergency room should any of these events occur. Discussed crisis intervention services and means to access. Patient adamantly and convincingly denies current suicidal or homicidal ideation or perceptual disturbance. Assisted Patient in processing session content; acknowledged and normalized Patient’s thoughts, feelings, and concerns by utilizing a person-centered approach in efforts to build appropriate rapport and a positive therapeutic relationship with open and honest communication. Therapist utilized dialectical behavior techniques to teach and model emotional regulation and relaxation methods. Therapist assisted Patient with identifying and implementing healthier coping strategies.     Assessment   Patient appears to be experiencing heightened anxiety and depression in response to COVID-19 epidemic  As a result, they can be reasonably expected to continue to benefit from treatment and would likely be at increased risk for decompensation otherwise.    Mental Status Exam:   Hygiene:    good  Cooperation:  Cooperative  Eye Contact:  Good  Psychomotor Behavior:  Appropriate  Affect:  Full range  Mood: content  Speech:  Normal  Thought Process:  Goal directed  Thought Content:  Mood congruent  Suicidal:  None  Homicidal:  None  Hallucinations:  None  Delusion:  None  Memory:  Intact  Orientation:  Person, Place, Time, and Situation  Reliability:  good  Insight:  Good  Judgement:  Good  Impulse Control:  Good  Physical/Medical Issues:  No      PHQ-Score Total:  PHQ-9 Total Score:        Patient's Support Network Includes:   and mother    Functional Status: Moderate impairment     Progress toward goal: Not at goal    Prognosis: Good with Ongoing Treatment            Impression/Formulation:    VISIT DIAGNOSIS:     ICD-10-CM ICD-9-CM   1. Generalized anxiety disorder  F41.1 300.02   2. Major depressive disorder, recurrent episode, moderate  F33.1 296.32   3. Post traumatic stress disorder (PTSD)  F43.10 309.81        Patient appeared alert and oriented.  Patient is voluntarily requesting to continue outpatient therapy at [facility].  Patient is receptive to assistance with maintaining a stable lifestyle.  Patient presents with history of anxiety, depression, and past trauma.  Patient is agreeable to attend routine therapy sessions.  Patient expressed desire to maintain stability and participate in the therapeutic process.        Crisis Plan:  Symptoms and/or behaviors to indicate a crisis: Excessive worry or fear and Feeling sad or low    What calming techniques or other strategies will patient use to de-esclate and stay safe: slow down, breathe, visualize calming self, think it though, listen to music, change focus, take a walk    Who is one person patient can contact to assist with de-escalation? mother    If symptoms/behaviors persist, Patient will present to the nearest hospital for an assessment. Advised patient of [facility] ER and assessment  services.     Plan:   Patient will continue in individual outpatient therapy with focus on improved functioning and coping skills, maintaining stability, and avoiding decompensation and the need for higher level of care.    Patient will contact this office ([facility] at [phone]), call 911 or present to the nearest emergency room should suicidal or homicidal ideations occur. Provide Cognitive Behavioral Therapy and Solution Focused Therapy to improve functioning, maintain stability, and avoid decompensation and the need for higher level of care.     Return in about 4 weeks, or earlier if symptoms worsen or fail to improve.    Recommended Referrals: none        This document has been electronically signed by Chas Ramirez LCSW  June 13, 2025 10:01 EDT        Part of this note may be an electronic transcription/translation of spoken language to printed text using the Dragon Dictation System.    Mode of Visit: Video  Location of patient: -HOME-  Location of provider: +HOME+  You have chosen to receive care through a telehealth visit.  The patient has signed the video visit consent form.  The visit included audio and video interaction. No technical issues occurred during this visit.

## 2025-06-30 ENCOUNTER — OFFICE VISIT (OUTPATIENT)
Dept: INTERNAL MEDICINE | Facility: CLINIC | Age: 30
End: 2025-06-30
Payer: COMMERCIAL

## 2025-06-30 VITALS
TEMPERATURE: 98.4 F | HEART RATE: 88 BPM | SYSTOLIC BLOOD PRESSURE: 116 MMHG | BODY MASS INDEX: 24.21 KG/M2 | RESPIRATION RATE: 12 BRPM | WEIGHT: 141.8 LBS | HEIGHT: 64 IN | DIASTOLIC BLOOD PRESSURE: 68 MMHG

## 2025-06-30 DIAGNOSIS — Z13.220 ENCOUNTER FOR LIPID SCREENING FOR CARDIOVASCULAR DISEASE: ICD-10-CM

## 2025-06-30 DIAGNOSIS — Z13.6 ENCOUNTER FOR LIPID SCREENING FOR CARDIOVASCULAR DISEASE: ICD-10-CM

## 2025-06-30 DIAGNOSIS — F33.9 RECURRENT MAJOR DEPRESSIVE DISORDER, REMISSION STATUS UNSPECIFIED: ICD-10-CM

## 2025-06-30 DIAGNOSIS — R63.4 WEIGHT LOSS: ICD-10-CM

## 2025-06-30 DIAGNOSIS — I10 PRIMARY HYPERTENSION: ICD-10-CM

## 2025-06-30 DIAGNOSIS — Z00.00 ENCOUNTER FOR WELLNESS EXAMINATION: Primary | ICD-10-CM

## 2025-06-30 DIAGNOSIS — E55.9 VITAMIN D DEFICIENCY: ICD-10-CM

## 2025-06-30 LAB
25(OH)D3 SERPL-MCNC: 49.9 NG/ML (ref 30–100)
ALBUMIN SERPL-MCNC: 4.3 G/DL (ref 3.5–5.2)
ALBUMIN/GLOB SERPL: 1.5 G/DL
ALP SERPL-CCNC: 101 U/L (ref 39–117)
ALT SERPL W P-5'-P-CCNC: 16 U/L (ref 1–33)
ANION GAP SERPL CALCULATED.3IONS-SCNC: 15.1 MMOL/L (ref 5–15)
AST SERPL-CCNC: 24 U/L (ref 1–32)
BASOPHILS # BLD AUTO: 0.04 10*3/MM3 (ref 0–0.2)
BASOPHILS NFR BLD AUTO: 0.7 % (ref 0–1.5)
BILIRUB SERPL-MCNC: 0.3 MG/DL (ref 0–1.2)
BUN SERPL-MCNC: 4 MG/DL (ref 6–20)
BUN/CREAT SERPL: 6.5 (ref 7–25)
CALCIUM SPEC-SCNC: 9.3 MG/DL (ref 8.6–10.5)
CHLORIDE SERPL-SCNC: 104 MMOL/L (ref 98–107)
CHOLEST SERPL-MCNC: 192 MG/DL (ref 0–200)
CO2 SERPL-SCNC: 22.9 MMOL/L (ref 22–29)
CREAT SERPL-MCNC: 0.62 MG/DL (ref 0.57–1)
DEPRECATED RDW RBC AUTO: 45.5 FL (ref 37–54)
EGFRCR SERPLBLD CKD-EPI 2021: 123 ML/MIN/1.73
EOSINOPHIL # BLD AUTO: 0.12 10*3/MM3 (ref 0–0.4)
EOSINOPHIL NFR BLD AUTO: 2.2 % (ref 0.3–6.2)
ERYTHROCYTE [DISTWIDTH] IN BLOOD BY AUTOMATED COUNT: 12.8 % (ref 12.3–15.4)
GLOBULIN UR ELPH-MCNC: 2.9 GM/DL
GLUCOSE SERPL-MCNC: 73 MG/DL (ref 65–99)
HCT VFR BLD AUTO: 46.4 % (ref 34–46.6)
HDLC SERPL-MCNC: 44 MG/DL (ref 40–60)
HGB BLD-MCNC: 16.2 G/DL (ref 12–15.9)
IMM GRANULOCYTES # BLD AUTO: 0.02 10*3/MM3 (ref 0–0.05)
IMM GRANULOCYTES NFR BLD AUTO: 0.4 % (ref 0–0.5)
LDLC SERPL CALC-MCNC: 125 MG/DL (ref 0–100)
LDLC/HDLC SERPL: 2.78 {RATIO}
LYMPHOCYTES # BLD AUTO: 1.99 10*3/MM3 (ref 0.7–3.1)
LYMPHOCYTES NFR BLD AUTO: 36.1 % (ref 19.6–45.3)
MCH RBC QN AUTO: 34 PG (ref 26.6–33)
MCHC RBC AUTO-ENTMCNC: 34.9 G/DL (ref 31.5–35.7)
MCV RBC AUTO: 97.5 FL (ref 79–97)
MONOCYTES # BLD AUTO: 0.31 10*3/MM3 (ref 0.1–0.9)
MONOCYTES NFR BLD AUTO: 5.6 % (ref 5–12)
NEUTROPHILS NFR BLD AUTO: 3.04 10*3/MM3 (ref 1.7–7)
NEUTROPHILS NFR BLD AUTO: 55 % (ref 42.7–76)
NRBC BLD AUTO-RTO: 0 /100 WBC (ref 0–0.2)
PLATELET # BLD AUTO: 245 10*3/MM3 (ref 140–450)
PMV BLD AUTO: 10.4 FL (ref 6–12)
POTASSIUM SERPL-SCNC: 3.6 MMOL/L (ref 3.5–5.2)
PROT SERPL-MCNC: 7.2 G/DL (ref 6–8.5)
RBC # BLD AUTO: 4.76 10*6/MM3 (ref 3.77–5.28)
SODIUM SERPL-SCNC: 142 MMOL/L (ref 136–145)
TRIGL SERPL-MCNC: 129 MG/DL (ref 0–150)
TSH SERPL DL<=0.05 MIU/L-ACNC: 0.78 UIU/ML (ref 0.27–4.2)
VLDLC SERPL-MCNC: 23 MG/DL (ref 5–40)
WBC NRBC COR # BLD AUTO: 5.52 10*3/MM3 (ref 3.4–10.8)

## 2025-06-30 PROCEDURE — 82306 VITAMIN D 25 HYDROXY: CPT | Performed by: NURSE PRACTITIONER

## 2025-06-30 PROCEDURE — 99395 PREV VISIT EST AGE 18-39: CPT | Performed by: NURSE PRACTITIONER

## 2025-06-30 PROCEDURE — 80061 LIPID PANEL: CPT | Performed by: NURSE PRACTITIONER

## 2025-06-30 PROCEDURE — 80050 GENERAL HEALTH PANEL: CPT | Performed by: NURSE PRACTITIONER

## 2025-06-30 RX ORDER — LISDEXAMFETAMINE DIMESYLATE 40 MG/1
CAPSULE ORAL
COMMUNITY
Start: 2025-06-07

## 2025-06-30 NOTE — PROGRESS NOTES
Female Physical Note      Patient Name: Elicia Arndt  : 1995   MRN: [id number]     Chief Complaint:    Chief Complaint   Patient presents with    Annual Exam       History of Present Illness: Elicia Arndt is a 30 y.o. female who is here today for their annual health maintenance and physical.  History of Present Illness  The patient presents for a physical exam.    Respiratory Issues  - She reports no respiratory issues such as shortness of breath or palpitations.    Mood and Psychiatric Health  - Her mood is stable.  - She has suicidal thoughts but no plans or intent to harm herself or others.  - She is under the care of a psychiatrist and is undergoing therapy, with her next appointment scheduled for next month.  - She has been diagnosed with ADHD and is under the care of Dr. Ba, who manages all her psychiatric medications.  - She started Vyvanse in 2025.    Musculoskeletal and Gastrointestinal Health  - She does not experience any joint pain, aches, or gastrointestinal changes.  - Bowel movements are regular.  - She has no urinary problems.    Menstrual and Gynecological Health  - She continues to menstruate regularly without heavy bleeding.  - She has a history of 2 pregnancies resulting in 2 daughters, with no miscarriages.  - She is currently using Nexplanon for birth control.  - She reports no changes in her breasts and is under the care of a gynecologist, with an appointment scheduled between 2025 and 2025.    Diet and Weight  - She does not engage in regular exercise; gets lots of steps at work.  - She typically eats once a day due to lack of appetite.  - She reports recent weight loss, noting a decrease from 169 lbs to 141 lbs since her last visit (since starting vyvanse).   - She occasionally vomits in the morning while brushing her teeth; otherwise no issues.    Safety and General Health  - She feels safe in her relationships.  - She maintains her dental and eye health  through regular check-ups.  - She reports no skin issues and is under the care of a dermatologist for a cyst on her side, which was treated with steroid injections and has since resolved.  - She walks several miles at work.    Blood Pressure  - Her blood pressure is well-controlled with metoprolol, losartan, and hydroxyzine.    Supplemental information: GYNECOLOGICAL HISTORY:  - Frequency and Flow: Regular, not heavy    SOCIAL HISTORY  She admits to smoking and is not interested in quitting at this time.    FAMILY HISTORY  She reports a family history of high cholesterol.        Subjective     Review of System: Review of Systems   A review of systems was performed, and the pertinent positives are noted in the HPI.      Past Medical History:   Past Medical History:   Diagnosis Date    ADHD (attention deficit hyperactivity disorder)     Anxiety and depression     Bipolar disorder     Hypertension     PTSD (post-traumatic stress disorder)     Rotator cuff tendinitis, right 2010    MRI and Ortho and PT 2019       Past Surgical History:   Past Surgical History:   Procedure Laterality Date    NO PAST SURGERIES      WISDOM TOOTH EXTRACTION         Family History:   Family History   Problem Relation Age of Onset    Hyperlipidemia Mother     Cancer Mother     Depression Mother     Stroke Mother     Alcohol abuse Father     Anxiety disorder Father     Diabetes Father     COPD Father     Depression Father     ADD / ADHD Father     Cancer Father     Asthma Sister     Hypertension Sister     Endometriosis Sister     Anxiety disorder Sister     Anxiety disorder Brother     Depression Brother     ADD / ADHD Brother     Heart disease Maternal Grandmother     COPD Maternal Grandmother     Ovarian cancer Maternal Grandmother     Heart attack Maternal Grandfather         40    Hypertension Maternal Grandfather     Stroke Maternal Grandfather     Anxiety disorder Maternal Grandfather     Depression Paternal Grandfather     Cancer  Paternal Grandfather        Social History:   Social History     Socioeconomic History    Marital status:    Tobacco Use    Smoking status: Every Day     Current packs/day: 0.50     Average packs/day: 0.5 packs/day for 5.0 years (2.5 ttl pk-yrs)     Types: Cigarettes, Electronic Cigarette     Passive exposure: Never    Smokeless tobacco: Never   Vaping Use    Vaping status: Some Days    Substances: Nicotine, Flavoring    Devices: Disposable, Pre-filled pod    Passive vaping exposure: Yes   Substance and Sexual Activity    Alcohol use: Yes     Alcohol/week: 3.0 standard drinks of alcohol     Types: 3 Shots of liquor per week    Drug use: No    Sexual activity: Yes     Partners: Male     Birth control/protection: Nexplanon     Comment: Nexplanon inserted 10/26/22       Medications:     Current Outpatient Medications:     Atogepant (Qulipta) 60 MG tablet, Take 1 tablet by mouth Daily., Disp: 30 tablet, Rfl: 5    hydroCHLOROthiazide 25 MG tablet, TAKE 1 TABLET BY MOUTH DAILY, Disp: 90 tablet, Rfl: 1    lisdexamfetamine (VYVANSE) 40 MG capsule, , Disp: , Rfl:     losartan (COZAAR) 50 MG tablet, TAKE 1 TABLET BY MOUTH DAILY, Disp: 90 tablet, Rfl: 1    nebivolol (BYSTOLIC) 5 MG tablet, TAKE 1 TABLET BY MOUTH DAILY, Disp: 30 tablet, Rfl: 3    sertraline (ZOLOFT) 100 MG tablet, TAKE 1 TABLET BY MOUTH DAILY, Disp: 30 tablet, Rfl: 0    ubrogepant (Ubrelvy) 100 MG tablet, Take one tablet with onset of headache; if symptoms persist or return, may repeat dose after >=2 hours., Disp: 9 tablet, Rfl: 0    Vraylar 3 MG capsule capsule, Take 1 capsule by mouth Daily., Disp: , Rfl:     Allergies:   Allergies   Allergen Reactions    Adhesive Tape Hives     Red and hives         Reviewed immunization history and updated state vaccination form as needed. Patient was counseled on COVID-19     PHQ-2 Depression Screening  Little interest or pleasure in doing things? Almost all   Feeling down, depressed, or hopeless? Almost all   PHQ-2  "Total Score 6       Colorectal Screening:     Last Completed Colonoscopy    This patient has no relevant Health Maintenance data.        Pap:    Last Completed Pap Smear            Upcoming       PAP SMEAR (Every 3 Years) Next due on 11/1/2027 11/01/2024  [facility] [place]    09/29/2021  Done - Dr Garcia                           Mammogram:    Last Completed Mammogram    This patient has no relevant Health Maintenance data.           Objective     Physical Exam:  Vital Signs:   Vitals:    06/30/25 0934   BP: 116/68   BP Location: Right arm   Patient Position: Sitting   Cuff Size: Adult   Pulse: 88   Resp: 12   Temp: 98.4 °F (36.9 °C)   TempSrc: Infrared   Weight: 64.3 kg (141 lb 12.8 oz)   Height: 161.3 cm (63.5\")   PainSc: 0-No pain     Body mass index is 24.72 kg/m². BMI is within normal parameters. No other follow-up for BMI required.        Physical Exam  Vitals and nursing note reviewed.   Constitutional:       General: She is not in acute distress.     Appearance: Normal appearance. She is not ill-appearing.   HENT:      Head: Normocephalic.      Right Ear: Tympanic membrane, ear canal and external ear normal. There is no impacted cerumen.      Left Ear: Tympanic membrane, ear canal and external ear normal. There is no impacted cerumen.      Nose: No nasal tenderness or rhinorrhea.      Mouth/Throat:      Mouth: Mucous membranes are moist.      Pharynx: Oropharynx is clear. No oropharyngeal exudate or posterior oropharyngeal erythema.   Eyes:      General:         Right eye: No discharge.         Left eye: No discharge.      Extraocular Movements: Extraocular movements intact.      Conjunctiva/sclera: Conjunctivae normal.      Pupils: Pupils are equal, round, and reactive to light.   Neck:      Thyroid: No thyromegaly.      Vascular: No carotid bruit.   Cardiovascular:      Rate and Rhythm: Normal rate and regular rhythm.      Pulses: Normal pulses.      " Heart sounds: Normal heart sounds. No murmur heard.     No gallop.   Pulmonary:      Effort: Pulmonary effort is normal.      Breath sounds: Normal breath sounds. No wheezing, rhonchi or rales.   Abdominal:      General: Bowel sounds are normal.      Palpations: Abdomen is soft. There is no mass.      Tenderness: There is no abdominal tenderness. There is no right CVA tenderness or left CVA tenderness.   Musculoskeletal:         General: No swelling or tenderness. Normal range of motion.      Cervical back: Normal range of motion.      Right lower leg: No edema.      Left lower leg: No edema.   Lymphadenopathy:      Cervical: No cervical adenopathy.   Skin:     General: Skin is warm and dry.      Capillary Refill: Capillary refill takes less than 2 seconds.      Findings: No erythema or rash.      Comments: No suspicious skin lesions   Neurological:      General: No focal deficit present.      Mental Status: She is alert and oriented to person, place, and time.      Motor: No weakness.   Psychiatric:         Mood and Affect: Mood normal.         Behavior: Behavior is cooperative.         Cognition and Memory: She does not exhibit impaired recent memory.       Physical Exam      Procedures    Assessment / Plan      Assessment/Plan:   Diagnoses and all orders for this visit:    1. Encounter for wellness examination (Primary)    2. Vitamin D deficiency  -     Vitamin D,25-Hydroxy; Future  -     Vitamin D,25-Hydroxy    3. Encounter for lipid screening for cardiovascular disease  -     Lipid Panel; Future  -     Lipid Panel    4. Primary hypertension  -     Comprehensive Metabolic Panel; Future  -     CBC & Differential; Future  -     TSH Rfx On Abnormal To Free T4; Future  -     Comprehensive Metabolic Panel  -     CBC & Differential  -     TSH Rfx On Abnormal To Free T4       Assessment & Plan  1. Health maintenance:  - BMI within normal range, but weight loss of 28 pounds since last visit, potentially due to  initiation of Vyvanse in 05/2025; will monitor and discuss with psychiatrist  - Vitamin D levels were normal last year; will be rechecked due to history of low levels  - Comprehensive panel of labs will be ordered  - Encouraged to receive COVID-19 vaccine at local pharmacy  - Advised to incorporate strength training into routine to enhance muscle strength    2. Depression/ADHD/weight loss:  - Reports having suicidal thoughts but no plans or intent to harm herself or others  - Currently following up with psychiatry and undergoing therapy  - Advised to reach out if condition worsens and to call 911, the 608 hotline, her therapist, or the provider if necessary  - Currently seeing an ADHD specialist and has been started on Vyvanse  - Weight loss may be related to this medication, which can decrease appetite  - Advised to monitor weight and discuss any concerns with psychiatrist    3. Hypertension:  - Currently taking metoprolol and losartan for blood pressure management  - Blood pressure reported to be with goal  Follow-up:  - Patient will follow up in 6 months  Discussed current problems may cause a copay for this visit. Patient verbalized an understanding and agreement with plan.      Recommend seatbelt, sunscreen, helmet when necessary, smoke detectors and carbon monoxide detectors in the home.   Patient to schedule eye, dental exam if not up-to-date and dermatology if needed.    Explained and discussed patient's condition and plan of care including labs and radiology that may be ordered.  Discussed when to follow-up.  Discussed possible red flags and how to follow-up with those.  Viewed patient's medications and discussed common side effects and symptoms to report. Patient to continue current medications as advised.  Be compliant with medications. Patient to call clinic if they have any worsening, no improvement, does not tolerate medication, or any future concerns about treatment.  Questions and concerns addressed  at this appointment.  Patient to report to ER for emergencies.  Patient verbalized an understanding and agreement with plan of care.      Follow Up:   Return in about 6 months (around 12/30/2025) for Recheck.    Patient or patient representative verbalized consent for the use of Ambient Listening during the visit with  ECTOR Alvarado for chart documentation. 6/30/2025  18:41 EDT      Health Maintenance, Female  Adopting a healthy lifestyle and getting preventive care can go a long way to promote health and wellness. Talk with your health care provider about what schedule of regular examinations is right for you. This is a good chance for you to check in with your provider about disease prevention and staying healthy.  In between checkups, there are plenty of things you can do on your own. Experts have done a lot of research about which lifestyle changes and preventive measures are most likely to keep you healthy. Ask your health care provider for more information.  Weight and diet  Eat a healthy diet  Be sure to include plenty of vegetables, fruits, low-fat dairy products, and lean protein.  Do not eat a lot of foods high in solid fats, added sugars, or salt.  Get regular exercise. This is one of the most important things you can do for your health.  Most adults should exercise for at least 150 minutes each week. The exercise should increase your heart rate and make you sweat (moderate-intensity exercise).  Most adults should also do strengthening exercises at least twice a week. This is in addition to the moderate-intensity exercise.     Maintain a healthy weight  Body mass index (BMI) is a measurement that can be used to identify possible weight problems. It estimates body fat based on height and weight. Your health care provider can help determine your BMI and help you achieve or maintain a healthy weight.  For females 20 years of age and older:  A BMI below 18.5 is considered underweight.  A BMI of 18.5  to 24.9 is normal.  A BMI of 25 to 29.9 is considered overweight.  A BMI of 30 and above is considered obese.     Watch levels of cholesterol and blood lipids  You should start having your blood tested for lipids and cholesterol at 20 years of age, then have this test every 5 years.  You may need to have your cholesterol levels checked more often if:  Your lipid or cholesterol levels are high.  You are older than 50 years of age.  You are at high risk for heart disease.     Cancer screening  Lung Cancer  Lung cancer screening is recommended for adults 55-80 years old who are at high risk for lung cancer because of a history of smoking.  A yearly low-dose CT scan of the lungs is recommended for people who:  Currently smoke.  Have quit within the past 15 years.  Have at least a 30-pack-year history of smoking. A pack year is smoking an average of one pack of cigarettes a day for 1 year.  Yearly screening should continue until it has been 15 years since you quit.  Yearly screening should stop if you develop a health problem that would prevent you from having lung cancer treatment.     Breast Cancer  Practice breast self-awareness. This means understanding how your breasts normally appear and feel.  It also means doing regular breast self-exams. Let your health care provider know about any changes, no matter how small.  If you are in your 20s or 30s, you should have a clinical breast exam (CBE) by a health care provider every 1-3 years as part of a regular health exam.  If you are 40 or older, have a CBE every year. Also consider having a breast X-ray (mammogram) every year.  If you have a family history of breast cancer, talk to your health care provider about genetic screening.  If you are at high risk for breast cancer, talk to your health care provider about having an MRI and a mammogram every year.  Breast cancer gene (BRCA) assessment is recommended for women who have family members with BRCA-related cancers.  BRCA-related cancers include:  Breast.  Ovarian.  Tubal.  Peritoneal cancers.  Results of the assessment will determine the need for genetic counseling and BRCA1 and BRCA2 testing.     Cervical Cancer  Your health care provider may recommend that you be screened regularly for cancer of the pelvic organs (ovaries, uterus, and vagina). This screening involves a pelvic examination, including checking for microscopic changes to the surface of your cervix (Pap test). You may be encouraged to have this screening done every 3 years, beginning at age 21.  For women ages 30-65, health care providers may recommend pelvic exams and Pap testing every 3 years, or they may recommend the Pap and pelvic exam, combined with testing for human papilloma virus (HPV), every 5 years. Some types of HPV increase your risk of cervical cancer. Testing for HPV may also be done on women of any age with unclear Pap test results.  Other health care providers may not recommend any screening for nonpregnant women who are considered low risk for pelvic cancer and who do not have symptoms. Ask your health care provider if a screening pelvic exam is right for you.  If you have had past treatment for cervical cancer or a condition that could lead to cancer, you need Pap tests and screening for cancer for at least 20 years after your treatment. If Pap tests have been discontinued, your risk factors (such as having a new sexual partner) need to be reassessed to determine if screening should resume. Some women have medical problems that increase the chance of getting cervical cancer. In these cases, your health care provider may recommend more frequent screening and Pap tests.     Colorectal Cancer  This type of cancer can be detected and often prevented.  Routine colorectal cancer screening usually begins at 50 years of age and continues through 75 years of age.  Your health care provider may recommend screening at an earlier age if you have risk  factors for colon cancer.  Your health care provider may also recommend using home test kits to check for hidden blood in the stool.  A small camera at the end of a tube can be used to examine your colon directly (sigmoidoscopy or colonoscopy). This is done to check for the earliest forms of colorectal cancer.  Routine screening usually begins at age 50.  Direct examination of the colon should be repeated every 5-10 years through 75 years of age. However, you may need to be screened more often if early forms of precancerous polyps or small growths are found.     Skin Cancer  Check your skin from head to toe regularly.  Tell your health care provider about any new moles or changes in moles, especially if there is a change in a mole's shape or color.  Also tell your health care provider if you have a mole that is larger than the size of a pencil eraser.  Always use sunscreen. Apply sunscreen liberally and repeatedly throughout the day.  Protect yourself by wearing long sleeves, pants, a wide-brimmed hat, and sunglasses whenever you are outside.     Heart disease, diabetes, and high blood pressure  High blood pressure causes heart disease and increases the risk of stroke. High blood pressure is more likely to develop in:  People who have blood pressure in the high end of the normal range (130-139/85-89 mm Hg).  People who are overweight or obese.  People who are .  If you are 18-39 years of age, have your blood pressure checked every 3-5 years. If you are 40 years of age or older, have your blood pressure checked every year. You should have your blood pressure measured twice--once when you are at a hospital or clinic, and once when you are not at a hospital or clinic. Record the average of the two measurements. To check your blood pressure when you are not at a hospital or clinic, you can use:  An automated blood pressure machine at a pharmacy.  A home blood pressure monitor.  If you are between 55  years and 79 years old, ask your health care provider if you should take aspirin to prevent strokes.  Have regular diabetes screenings. This involves taking a blood sample to check your fasting blood sugar level.  If you are at a normal weight and have a low risk for diabetes, have this test once every three years after 45 years of age.  If you are overweight and have a high risk for diabetes, consider being tested at a younger age or more often.  Preventing infection  Hepatitis B  If you have a higher risk for hepatitis B, you should be screened for this virus. You are considered at high risk for hepatitis B if:  You were born in a country where hepatitis B is common. Ask your health care provider which countries are considered high risk.  Your parents were born in a high-risk country, and you have not been immunized against hepatitis B (hepatitis B vaccine).  You have HIV or AIDS.  You use needles to inject street drugs.  You live with someone who has hepatitis B.  You have had sex with someone who has hepatitis B.  You get hemodialysis treatment.  You take certain medicines for conditions, including cancer, organ transplantation, and autoimmune conditions.     Hepatitis C  Blood testing is recommended for:  Everyone born from 1945 through 1965.  Anyone with known risk factors for hepatitis C.     Sexually transmitted infections (STIs)  You should be screened for sexually transmitted infections (STIs) including gonorrhea and chlamydia if:  You are sexually active and are younger than 24 years of age.  You are older than 24 years of age and your health care provider tells you that you are at risk for this type of infection.  Your sexual activity has changed since you were last screened and you are at an increased risk for chlamydia or gonorrhea. Ask your health care provider if you are at risk.  If you do not have HIV, but are at risk, it may be recommended that you take a prescription medicine daily to prevent HIV  infection. This is called pre-exposure prophylaxis (PrEP). You are considered at risk if:  You are sexually active and do not regularly use condoms or know the HIV status of your partner(s).  You take drugs by injection.  You are sexually active with a partner who has HIV.     Talk with your health care provider about whether you are at high risk of being infected with HIV. If you choose to begin PrEP, you should first be tested for HIV. You should then be tested every 3 months for as long as you are taking PrEP.  Pregnancy  If you are premenopausal and you may become pregnant, ask your health care provider about preconception counseling.  If you may become pregnant, take 400 to 800 micrograms (mcg) of folic acid every day.  If you want to prevent pregnancy, talk to your health care provider about birth control (contraception).  Osteoporosis and menopause  Osteoporosis is a disease in which the bones lose minerals and strength with aging. This can result in serious bone fractures. Your risk for osteoporosis can be identified using a bone density scan.  If you are 65 years of age or older, or if you are at risk for osteoporosis and fractures, ask your health care provider if you should be screened.  Ask your health care provider whether you should take a calcium or vitamin D supplement to lower your risk for osteoporosis.  Menopause may have certain physical symptoms and risks.  Hormone replacement therapy may reduce some of these symptoms and risks.  Talk to your health care provider about whether hormone replacement therapy is right for you.  Follow these instructions at home:  Schedule regular health, dental, and eye exams.  Stay current with your immunizations.  Do not use any tobacco products including cigarettes, chewing tobacco, or electronic cigarettes.  If you are pregnant, do not drink alcohol.  If you are breastfeeding, limit how much and how often you drink alcohol.  Limit alcohol intake to no more than 1  drink per day for nonpregnant women. One drink equals 12 ounces of beer, 5 ounces of wine, or 1½ ounces of hard liquor.  Do not use street drugs.  Do not share needles.  Ask your health care provider for help if you need support or information about quitting drugs.  Tell your health care provider if you often feel depressed.  Tell your health care provider if you have ever been abused or do not feel safe at home.  This information is not intended to replace advice given to you by your health care provider. Make sure you discuss any questions you have with your health care provider.  Document Released: 07/02/2012 Document Revised: 05/25/2017 Document Reviewed: 09/20/2016  [facility] Interactive Patient Education © 2018 [facility] [employer]   Elicia Arndt voices understanding and acceptance of this advice and will call back with any further questions or concerns. AVS with preventive healthcare tips printed for patient.     ECTOR Alvarado  [facility]

## 2025-07-01 ENCOUNTER — RESULTS FOLLOW-UP (OUTPATIENT)
Dept: INTERNAL MEDICINE | Facility: CLINIC | Age: 30
End: 2025-07-01
Payer: COMMERCIAL

## 2025-07-02 NOTE — TELEPHONE ENCOUNTER
Name: Elicia Arndt      Relationship: Self      Best Callback Number: [phone]       HUB PROVIDED THE RELAY MESSAGE FROM THE OFFICE      PATIENT: VOICED UNDERSTANDING AND HAS NO FURTHER QUESTIONS AT THIS TIME    ADDITIONAL INFORMATION:

## 2025-07-11 ENCOUNTER — TELEMEDICINE (OUTPATIENT)
Dept: PSYCHIATRY | Facility: CLINIC | Age: 30
End: 2025-07-11
Payer: COMMERCIAL

## 2025-07-11 DIAGNOSIS — F33.1 MAJOR DEPRESSIVE DISORDER, RECURRENT EPISODE, MODERATE: ICD-10-CM

## 2025-07-11 DIAGNOSIS — F41.1 GENERALIZED ANXIETY DISORDER: Primary | ICD-10-CM

## 2025-07-11 DIAGNOSIS — F43.10 POST TRAUMATIC STRESS DISORDER (PTSD): ICD-10-CM

## 2025-07-11 NOTE — PROGRESS NOTES
"[facility]   Follow-up Progress Note     Date: July 11, 2025  Time In: 9:01  Time Out: 9:42      PROGRESS NOTE  Data:  Elicia Arndt is a 30 y.o. female presenting to [facility] (through [facility]), [address] [place], [address] using a secure MyChart Video Visit through [facility] for assessment with Chas Ramirez LCSW. The patient is seen remotely in their home using [facility] My Chart. Patient is being seen via telehealth and stated they are in a secure environment for this session. The patient's condition being diagnosed/treated is appropriate for telemedicine. The provider identified herself as well as her credentials. The patient and/or patients guardian consent to be seen remotely, and when consent is given they understand that the consent allows for patient identifiable information to be sent to a third party as needed. They may refuse to be seen remotely at any time. The electronic data is encrypted and password protected, and the patient has been advised of the potential risks to privacy not withstanding such measures.    Today Patient stated she has felt stressed about being the only person bringing income to the home. Patient stated her  has not been looking for a job and has not mention any plans to find employment. Patient expressed she hasn't had the courage to express the pressure she is under paying all the bills and providing groceries. Patient expressed she is worried about \"getting into an argument.\" Patient stated her  is helpful around the house with cleaning and caring for the children. Patient stated she is also starting school in the fall and will be taking at least one class in person. Patient expressed she is worried about balancing everything. Patient stated her grandmother's health and mental capacity is continuing to decline. Patient stated she was released from " "[facility] and placed in a nursing home in [place]. Patient stated her grandmother wants to come back to [place] so \"be homeless and live out of her car.\" Patient stated her grandmother has not been taking her medication consistently and refuses assistance. Patient engaged in processing her feelings. Discussed with patient communicating her feelings with her  to start a discussion about their future plans.       Clinical Maneuvering/Intervention:    Chief Complaint: anxiety, depression, past trauma    (Scales based on 0 - 10 with 10 being the worst)  Depression: 3 Anxiety: 3     Assisted Patient in processing above session content; acknowledged and normalized patient’s thoughts, feelings, and concerns.  Rationalized patient thought process regarding financial pressure living off only patient's income.  Discussed triggers associated with patient's anxiety.  Also discussed coping skills for patient to implement such as communicating patient's feelings.    Allowed Patient to freely discuss issues  without interruption or judgement with unconditional positive regard, active listening skills, and empathy. Therapist provided a safe, confidential environment to facilitate the development of a positive therapeutic relationship and encouraged open, honest communication. Assisted Patient in identifying risk factors which would indicate the need for higher level of care including thoughts to harm self or others and/or self-harming behavior and encouraged Patient to contact this office, call 911, or present to the nearest emergency room should any of these events occur. Discussed crisis intervention services and means to access. Patient adamantly and convincingly denies current suicidal or homicidal ideation or perceptual disturbance. Assisted Patient in processing session content; acknowledged and normalized Patient’s thoughts, feelings, and concerns by utilizing a person-centered approach in efforts to build " appropriate rapport and a positive therapeutic relationship with open and honest communication. Therapist utilized dialectical behavior techniques to teach and model emotional regulation and relaxation methods. Therapist assisted Patient with identifying and implementing healthier coping strategies.     Assessment   Patient appears to be experiencing heightened anxiety and depression in response to COVID-19 epidemic  As a result, they can be reasonably expected to continue to benefit from treatment and would likely be at increased risk for decompensation otherwise.    Mental Status Exam:   Hygiene:   good  Cooperation:  Cooperative  Eye Contact:  Good  Psychomotor Behavior:  Appropriate  Affect:  Full range  Mood: stressed  Speech:  Normal  Thought Process:  Goal directed  Thought Content:  Mood congruent  Suicidal:  None  Homicidal:  None  Hallucinations:  None  Delusion:  None  Memory:  Intact  Orientation:  Person, Place, Time, and Situation  Reliability:  good  Insight:  Good  Judgement:  Good  Impulse Control:  Good  Physical/Medical Issues:  No      PHQ-Score Total:  PHQ-9 Total Score:        Patient's Support Network Includes:   and mother    Functional Status: Moderate impairment     Progress toward goal: Not at goal    Prognosis: Good with Ongoing Treatment            Impression/Formulation:    VISIT DIAGNOSIS:     ICD-10-CM ICD-9-CM   1. Generalized anxiety disorder  F41.1 300.02   2. Major depressive disorder, recurrent episode, moderate  F33.1 296.32   3. Post traumatic stress disorder (PTSD)  F43.10 309.81        Patient appeared alert and oriented.  Patient is voluntarily requesting to continue outpatient therapy at [facility].  Patient is receptive to assistance with maintaining a stable lifestyle.  Patient presents with history of anxiety, depression, and past trauma.  Patient is agreeable to attend routine therapy sessions.  Patient expressed desire to  maintain stability and participate in the therapeutic process.        Crisis Plan:  Symptoms and/or behaviors to indicate a crisis: Excessive worry or fear and Feeling sad or low    What calming techniques or other strategies will patient use to de-esclate and stay safe: slow down, breathe, visualize calming self, think it though, listen to music, change focus, take a walk    Who is one person patient can contact to assist with de-escalation? mother    If symptoms/behaviors persist, Patient will present to the nearest hospital for an assessment. Advised patient of [facility] ER and assessment services.     Plan:   Patient will continue in individual outpatient therapy with focus on improved functioning and coping skills, maintaining stability, and avoiding decompensation and the need for higher level of care.    Patient will contact this office ([facility] at [phone]), call 911 or present to the nearest emergency room should suicidal or homicidal ideations occur. Provide Cognitive Behavioral Therapy and Solution Focused Therapy to improve functioning, maintain stability, and avoid decompensation and the need for higher level of care.     Return in about 3 weeks, or earlier if symptoms worsen or fail to improve.    Recommended Referrals: none        This document has been electronically signed by Chas Ramirez LCSW  July 11, 2025 09:01 EDT        Part of this note may be an electronic transcription/translation of spoken language to printed text using the Dragon Dictation System.    Mode of Visit: Video  Location of patient: -HOME-  Location of provider: +HOME+  You have chosen to receive care through a telehealth visit.  The patient has signed the video visit consent form.  The visit included audio and video interaction. No technical issues occurred during this visit.

## 2025-07-15 DIAGNOSIS — I10 HYPERTENSION, UNSPECIFIED TYPE: ICD-10-CM

## 2025-07-15 RX ORDER — HYDROCHLOROTHIAZIDE 25 MG/1
25 TABLET ORAL DAILY
Qty: 90 TABLET | Refills: 1 | Status: SHIPPED | OUTPATIENT
Start: 2025-07-15

## 2025-07-15 RX ORDER — LOSARTAN POTASSIUM 50 MG/1
50 TABLET ORAL DAILY
Qty: 90 TABLET | Refills: 1 | Status: SHIPPED | OUTPATIENT
Start: 2025-07-15

## 2025-07-18 NOTE — THERAPY TREATMENT NOTE
Outpatient Physical Therapy Ortho Treatment Note  [facility]     Patient Name: Elicia Arndt  : 1995  MRN: [id number]  Today's Date: 2019      Visit Date: 2019    Visit Dx:  No diagnosis found.    Patient Active Problem List   Diagnosis   • Constipation   • Pain in female pelvis   • Depression with anxiety   • Hx of migraine headaches        No past medical history on file.     No past surgical history on file.    PT Ortho     Row Name 19 1600       Subjective Comments    Subjective Comments  Pt stated that her R shoulder was painful upon presentation. She has been partially compliant with her HEP but stated that she is very busy with her kids. 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 She mentioned that she has had a bad experience with PT in the past and is nervous that her pain will inc with exercise.   -LS       Precautions and Contraindications    Precautions/Limitations  no known precautions/limitations  -LS       Subjective Pain    Able to rate subjective pain?  yes  -LS    Pre-Treatment Pain Level  8  -LS    Post-Treatment Pain Level  8  -LS       Shoulder Girdle Palpation    Shoulder Girdle Palpation?  Yes  -LS    Supraspinatus Insertion  Right:;Tender  -LS       Right Upper Ext    Rt Shoulder Abduction AROM  90  -LS    Rt Shoulder Flexion AROM  126  -LS    Rt Shoulder External Rotation AROM  70  -LS    Rt Shoulder Internal Rotation AROM  T9  -LS      User Key  (r) = Recorded By, (t) = Taken By, (c) = Cosigned By    Initials Name Provider Type    Mitul Jones PT Physical Therapist                      PT Assessment/Plan     Row Name 19 1600          PT Assessment    Assessment Comments  Pt presented to PT with complaints of high levels of pain in her R shoulder. She demonstrated appropriate performance of her HEP for isometric RC strengthening but required intermittent cuing for correct  positioning. Even with isometric activity of the RC, pt reported pain and was guarded with her periscapular mm. As a result, scapular retraction exercises with associated rotation of the shoulder were introduced to dec stress on the RC and to establish appropriate foundational UE strength. She tolerated scapular exercises very well with min complaints of pain or discomfort but did have initial difficulty avoiding UT compensations. She was cued into shoulder depression and displayed appropriate technique thereafter. MT was tolerated very well and included inf jt mobs and PROM into flexion and abduction. Actively, the pt acheived only 90 deg of abduction, but appx 130 deg was available passively before onset of pain. I believe she is experiencing a RC tendinitis and she was encouraged to ice 1-2x/day to minimize jt inflammation. Her HEP was modified to include scapular retraction activities per external documentation.   -LS        PT Plan    PT Plan Comments  Progress scapular retraction exercises as tolerated with transition to [facility] RC activity as able. Continue MT for improved AROM.   -       User Key  (r) = Recorded By, (t) = Taken By, (c) = Cosigned By    Initials Name Provider Type    Mitul Jones, PT Physical Therapist            Exercises     Row Name 05/14/19 1600             Subjective Comments    Subjective Comments  Pt stated that her R shoulder was painful upon presentation. She has been partially compliant with her HEP but stated that she is very busy with her kids. 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 She mentioned that she has had a bad experience with PT in the past and is nervous that her pain will inc with exercise.   -LS         Subjective Pain    Able to rate subjective pain?  yes  -LS      Pre-Treatment Pain Level  8  -LS      Post-Treatment Pain Level  8  -LS         Total Minutes    84482 - PT Therapeutic  Exercise Minutes  30  -LS      00151 - PT Manual Therapy Minutes  10  -LS         Exercise 1    Exercise Name 1  TE per external documentation to review and progress HEP and to introduce scapular retraction activities.   -LS        User Key  (r) = Recorded By, (t) = Taken By, (c) = Cosigned By    Initials Name Provider Type    Mitul Jones PT Physical Therapist                      Manual Rx (last 36 hours)      Manual Treatments     Row Name 05/14/19 1600             Total Minutes    20417 - PT Manual Therapy Minutes  10  -LS         Manual Rx 1    Manual Rx 1 Location  R shoulder   -LS      Manual Rx 1 Type  Inferior jt mobs with PROM into abd and flex  -LS      Manual Rx 1 Grade  3  -LS         Manual Rx 2    Manual Rx 2 Location  R shoulder   -LS      Manual Rx 2 Type  Long axis distraction   -LS        User Key  (r) = Recorded By, (t) = Taken By, (c) = Cosigned By    Initials Name Provider Type    Mitul Jones PT Physical Therapist                             Time Calculation:   Start Time: 1555  Therapy Charges for Today     Code Description Service Date Service Provider Modifiers Qty    [id number]  PT THER PROC EA 15 MIN 5/14/2019 Mitul Phillips, PT GP 2    [id number]  PT MANUAL THERAPY EA 15 MIN 5/14/2019 Mitul Phillips PT GP 1                    Mitul Phillips PT  5/14/2019      Satisfactory

## 2025-08-01 ENCOUNTER — TELEMEDICINE (OUTPATIENT)
Dept: PSYCHIATRY | Facility: CLINIC | Age: 30
End: 2025-08-01
Payer: COMMERCIAL

## 2025-08-01 DIAGNOSIS — F33.1 MAJOR DEPRESSIVE DISORDER, RECURRENT EPISODE, MODERATE: ICD-10-CM

## 2025-08-01 DIAGNOSIS — F43.10 POST TRAUMATIC STRESS DISORDER (PTSD): ICD-10-CM

## 2025-08-01 DIAGNOSIS — F41.1 GENERALIZED ANXIETY DISORDER: Primary | ICD-10-CM

## 2025-08-01 NOTE — PROGRESS NOTES
[facility]   Follow-up Progress Note     Date: August 1, 2025  Time In: 10:00  Time Out: 10:37      PROGRESS NOTE  Data:  Elicia Arndt is a 30 y.o. female presenting to [facility] (through [facility]), [address] [place], [address] using a secure MyChart Video Visit through [facility] for assessment with Chas Ramirez LCSW. The patient is seen remotely in their home using [facility] My Chart. Patient is being seen via telehealth and stated they are in a secure environment for this session. The patient's condition being diagnosed/treated is appropriate for telemedicine. The provider identified herself as well as her credentials. The patient and/or patients guardian consent to be seen remotely, and when consent is given they understand that the consent allows for patient identifiable information to be sent to a third party as needed. They may refuse to be seen remotely at any time. The electronic data is encrypted and password protected, and the patient has been advised of the potential risks to privacy not withstanding such measures.    Today Patient stated she has been working on getting her children ready for school. Patient expressed continued concern about their financial situation and still needing to get her children clothes and shoes. Patient stated she has still not expressed to her  her feelings about the pressure of being the sole person providing an income currently. Processed with patient the barriers to having this discussing with her . Patient was engaged in discussing strategies to having a honest conversation with her . Patient stated she has been spending more time with friends, especially patient's best friend from high school. Patient this friend has been a support through her current stressors.       Clinical Maneuvering/Intervention:    Chief Complaint: anxiety,  depression, past trauma    (Scales based on 0 - 10 with 10 being the worst)  Depression: 5 Anxiety: 7     Assisted Patient in processing above session content; acknowledged and normalized patient’s thoughts, feelings, and concerns.  Rationalized patient thought process regarding avoiding expressing to patient's  how their finances are impacting patient.  Discussed triggers associated with patient's anxiety.  Also discussed coping skills for patient to implement such as communicating patient's feelings and needs.    Allowed Patient to freely discuss issues  without interruption or judgement with unconditional positive regard, active listening skills, and empathy. Therapist provided a safe, confidential environment to facilitate the development of a positive therapeutic relationship and encouraged open, honest communication. Assisted Patient in identifying risk factors which would indicate the need for higher level of care including thoughts to harm self or others and/or self-harming behavior and encouraged Patient to contact this office, call 911, or present to the nearest emergency room should any of these events occur. Discussed crisis intervention services and means to access. Patient adamantly and convincingly denies current suicidal or homicidal ideation or perceptual disturbance. Assisted Patient in processing session content; acknowledged and normalized Patient’s thoughts, feelings, and concerns by utilizing a person-centered approach in efforts to build appropriate rapport and a positive therapeutic relationship with open and honest communication. Therapist utilized dialectical behavior techniques to teach and model emotional regulation and relaxation methods. Therapist assisted Patient with identifying and implementing healthier coping strategies.     Assessment   Patient appears to be experiencing heightened anxiety and depression in response to COVID-19 epidemic  As a result, they can be reasonably  expected to continue to benefit from treatment and would likely be at increased risk for decompensation otherwise.    Mental Status Exam:   Hygiene:   good  Cooperation:  Cooperative  Eye Contact:  Good  Psychomotor Behavior:  Appropriate  Affect:  Full range  Mood: overwhelmed  Speech:  Normal  Thought Process:  Goal directed  Thought Content:  Mood congruent  Suicidal:  None  Homicidal:  None  Hallucinations:  None  Delusion:  None  Memory:  Intact  Orientation:  Person, Place, Time, and Situation  Reliability:  good  Insight:  Good  Judgement:  Good  Impulse Control:  Good  Physical/Medical Issues:  No      PHQ-Score Total:  PHQ-9 Total Score:        Patient's Support Network Includes:   and mother    Functional Status: Moderate impairment     Progress toward goal: Not at goal    Prognosis: Good with Ongoing Treatment            Impression/Formulation:    VISIT DIAGNOSIS:     ICD-10-CM ICD-9-CM   1. Generalized anxiety disorder  F41.1 300.02   2. Major depressive disorder, recurrent episode, moderate  F33.1 296.32   3. Post traumatic stress disorder (PTSD)  F43.10 309.81        Patient appeared alert and oriented.  Patient is voluntarily requesting to continue outpatient therapy at [facility].  Patient is receptive to assistance with maintaining a stable lifestyle.  Patient presents with history of anxiety, depression, and past trauma.  Patient is agreeable to attend routine therapy sessions.  Patient expressed desire to maintain stability and participate in the therapeutic process.        Crisis Plan:  Symptoms and/or behaviors to indicate a crisis: Excessive worry or fear and Feeling sad or low    What calming techniques or other strategies will patient use to de-esclate and stay safe: slow down, breathe, visualize calming self, think it though, listen to music, change focus, take a walk    Who is one person patient can contact to assist with de-escalation?  mother    If symptoms/behaviors persist, Patient will present to the nearest hospital for an assessment. Advised patient of [facility] ER and assessment services.     Plan:   Patient will continue in individual outpatient therapy with focus on improved functioning and coping skills, maintaining stability, and avoiding decompensation and the need for higher level of care.    Patient will contact this office ([facility] at [phone]), call 911 or present to the nearest emergency room should suicidal or homicidal ideations occur. Provide Cognitive Behavioral Therapy and Solution Focused Therapy to improve functioning, maintain stability, and avoid decompensation and the need for higher level of care.     Return in about 4 weeks, or earlier if symptoms worsen or fail to improve.    Recommended Referrals: none        This document has been electronically signed by Chas Ramirez LCSW  August 1, 2025 10:01 EDT        Part of this note may be an electronic transcription/translation of spoken language to printed text using the Dragon Dictation System.    Mode of Visit: Video  Location of patient: -HOME-  Location of provider: +HOME+  You have chosen to receive care through a telehealth visit.  The patient has signed the video visit consent form.  The visit included audio and video interaction. No technical issues occurred during this visit.

## 2025-08-05 DIAGNOSIS — G43.009 MIGRAINE WITHOUT AURA AND WITHOUT STATUS MIGRAINOSUS, NOT INTRACTABLE: ICD-10-CM

## 2025-08-05 DIAGNOSIS — F33.9 RECURRENT MAJOR DEPRESSIVE DISORDER, REMISSION STATUS UNSPECIFIED: Primary | ICD-10-CM

## 2025-08-05 DIAGNOSIS — I10 HYPERTENSION, UNSPECIFIED TYPE: ICD-10-CM

## 2025-08-05 RX ORDER — LISDEXAMFETAMINE DIMESYLATE 40 MG/1
CAPSULE ORAL
OUTPATIENT
Start: 2025-08-05

## 2025-08-05 RX ORDER — ATOGEPANT 60 MG/1
60 TABLET ORAL DAILY
Qty: 90 TABLET | Refills: 1 | Status: SHIPPED | OUTPATIENT
Start: 2025-08-05

## 2025-08-05 RX ORDER — CARIPRAZINE 3 MG/1
3 CAPSULE, GELATIN COATED ORAL DAILY
Qty: 30 CAPSULE | OUTPATIENT
Start: 2025-08-05

## 2025-08-05 RX ORDER — LOSARTAN POTASSIUM 50 MG/1
50 TABLET ORAL DAILY
Qty: 90 TABLET | Refills: 1 | Status: SHIPPED | OUTPATIENT
Start: 2025-08-05

## 2025-08-05 RX ORDER — HYDROCHLOROTHIAZIDE 25 MG/1
25 TABLET ORAL DAILY
Qty: 90 TABLET | Refills: 1 | Status: SHIPPED | OUTPATIENT
Start: 2025-08-05

## 2025-08-05 RX ORDER — NEBIVOLOL 5 MG/1
5 TABLET ORAL DAILY
Qty: 90 TABLET | Refills: 1 | Status: SHIPPED | OUTPATIENT
Start: 2025-08-05

## 2025-08-07 ENCOUNTER — TELEMEDICINE (OUTPATIENT)
Dept: PSYCHIATRY | Facility: CLINIC | Age: 30
End: 2025-08-07
Payer: COMMERCIAL

## 2025-08-07 DIAGNOSIS — F43.10 POST TRAUMATIC STRESS DISORDER (PTSD): ICD-10-CM

## 2025-08-07 DIAGNOSIS — F33.1 MAJOR DEPRESSIVE DISORDER, RECURRENT EPISODE, MODERATE: ICD-10-CM

## 2025-08-07 DIAGNOSIS — F41.1 GENERALIZED ANXIETY DISORDER: Primary | ICD-10-CM

## 2025-08-28 ENCOUNTER — TELEPHONE (OUTPATIENT)
Dept: INTERNAL MEDICINE | Facility: CLINIC | Age: 30
End: 2025-08-28
Payer: COMMERCIAL

## 2025-08-29 ENCOUNTER — TELEMEDICINE (OUTPATIENT)
Dept: PSYCHIATRY | Facility: CLINIC | Age: 30
End: 2025-08-29
Payer: COMMERCIAL

## 2025-08-29 DIAGNOSIS — F33.1 MAJOR DEPRESSIVE DISORDER, RECURRENT EPISODE, MODERATE: ICD-10-CM

## 2025-08-29 DIAGNOSIS — F43.10 POST TRAUMATIC STRESS DISORDER (PTSD): ICD-10-CM

## 2025-08-29 DIAGNOSIS — F41.1 GENERALIZED ANXIETY DISORDER: Primary | ICD-10-CM
